# Patient Record
Sex: MALE | Race: BLACK OR AFRICAN AMERICAN | NOT HISPANIC OR LATINO | URBAN - METROPOLITAN AREA
[De-identification: names, ages, dates, MRNs, and addresses within clinical notes are randomized per-mention and may not be internally consistent; named-entity substitution may affect disease eponyms.]

---

## 2017-12-07 ENCOUNTER — INPATIENT (INPATIENT)
Facility: HOSPITAL | Age: 72
LOS: 6 days | Discharge: HOME | End: 2017-12-14
Attending: SURGERY | Admitting: SURGERY
Payer: MEDICARE

## 2017-12-07 VITALS
DIASTOLIC BLOOD PRESSURE: 87 MMHG | SYSTOLIC BLOOD PRESSURE: 126 MMHG | RESPIRATION RATE: 18 BRPM | OXYGEN SATURATION: 100 % | HEART RATE: 55 BPM | TEMPERATURE: 98 F

## 2017-12-07 DIAGNOSIS — I71.4 ABDOMINAL AORTIC ANEURYSM, WITHOUT RUPTURE: ICD-10-CM

## 2017-12-07 LAB
ALBUMIN SERPL ELPH-MCNC: 4.2 G/DL — SIGNIFICANT CHANGE UP (ref 3.3–5)
ALP SERPL-CCNC: 123 U/L — HIGH (ref 40–120)
ALT FLD-CCNC: 17 U/L — SIGNIFICANT CHANGE UP (ref 4–41)
APTT BLD: 32.3 SEC — SIGNIFICANT CHANGE UP (ref 27.5–37.4)
AST SERPL-CCNC: 23 U/L — SIGNIFICANT CHANGE UP (ref 4–40)
BASOPHILS # BLD AUTO: 0.05 K/UL — SIGNIFICANT CHANGE UP (ref 0–0.2)
BASOPHILS NFR BLD AUTO: 0.5 % — SIGNIFICANT CHANGE UP (ref 0–2)
BILIRUB SERPL-MCNC: 0.2 MG/DL — SIGNIFICANT CHANGE UP (ref 0.2–1.2)
BLD GP AB SCN SERPL QL: NEGATIVE — SIGNIFICANT CHANGE UP
BUN SERPL-MCNC: 15 MG/DL — SIGNIFICANT CHANGE UP (ref 7–23)
CALCIUM SERPL-MCNC: 9.4 MG/DL — SIGNIFICANT CHANGE UP (ref 8.4–10.5)
CHLORIDE SERPL-SCNC: 102 MMOL/L — SIGNIFICANT CHANGE UP (ref 98–107)
CO2 SERPL-SCNC: 25 MMOL/L — SIGNIFICANT CHANGE UP (ref 22–31)
CREAT SERPL-MCNC: 0.99 MG/DL — SIGNIFICANT CHANGE UP (ref 0.5–1.3)
EOSINOPHIL # BLD AUTO: 0.28 K/UL — SIGNIFICANT CHANGE UP (ref 0–0.5)
EOSINOPHIL NFR BLD AUTO: 2.8 % — SIGNIFICANT CHANGE UP (ref 0–6)
GLUCOSE SERPL-MCNC: 106 MG/DL — HIGH (ref 70–99)
HCT VFR BLD CALC: 39.5 % — SIGNIFICANT CHANGE UP (ref 39–50)
HGB BLD-MCNC: 12.7 G/DL — LOW (ref 13–17)
IMM GRANULOCYTES # BLD AUTO: 0.02 # — SIGNIFICANT CHANGE UP
IMM GRANULOCYTES NFR BLD AUTO: 0.2 % — SIGNIFICANT CHANGE UP (ref 0–1.5)
INR BLD: 1.14 — SIGNIFICANT CHANGE UP (ref 0.88–1.17)
LYMPHOCYTES # BLD AUTO: 2.68 K/UL — SIGNIFICANT CHANGE UP (ref 1–3.3)
LYMPHOCYTES # BLD AUTO: 26.4 % — SIGNIFICANT CHANGE UP (ref 13–44)
MCHC RBC-ENTMCNC: 28.5 PG — SIGNIFICANT CHANGE UP (ref 27–34)
MCHC RBC-ENTMCNC: 32.2 % — SIGNIFICANT CHANGE UP (ref 32–36)
MCV RBC AUTO: 88.8 FL — SIGNIFICANT CHANGE UP (ref 80–100)
MONOCYTES # BLD AUTO: 1.29 K/UL — HIGH (ref 0–0.9)
MONOCYTES NFR BLD AUTO: 12.7 % — SIGNIFICANT CHANGE UP (ref 2–14)
NEUTROPHILS # BLD AUTO: 5.83 K/UL — SIGNIFICANT CHANGE UP (ref 1.8–7.4)
NEUTROPHILS NFR BLD AUTO: 57.4 % — SIGNIFICANT CHANGE UP (ref 43–77)
NRBC # FLD: 0 — SIGNIFICANT CHANGE UP
PLATELET # BLD AUTO: 206 K/UL — SIGNIFICANT CHANGE UP (ref 150–400)
PMV BLD: 10.4 FL — SIGNIFICANT CHANGE UP (ref 7–13)
POTASSIUM SERPL-MCNC: 4 MMOL/L — SIGNIFICANT CHANGE UP (ref 3.5–5.3)
POTASSIUM SERPL-SCNC: 4 MMOL/L — SIGNIFICANT CHANGE UP (ref 3.5–5.3)
PROT SERPL-MCNC: 7.8 G/DL — SIGNIFICANT CHANGE UP (ref 6–8.3)
PROTHROM AB SERPL-ACNC: 12.7 SEC — SIGNIFICANT CHANGE UP (ref 9.8–13.1)
RBC # BLD: 4.45 M/UL — SIGNIFICANT CHANGE UP (ref 4.2–5.8)
RBC # FLD: 14 % — SIGNIFICANT CHANGE UP (ref 10.3–14.5)
RH IG SCN BLD-IMP: POSITIVE — SIGNIFICANT CHANGE UP
SODIUM SERPL-SCNC: 139 MMOL/L — SIGNIFICANT CHANGE UP (ref 135–145)
WBC # BLD: 10.15 K/UL — SIGNIFICANT CHANGE UP (ref 3.8–10.5)
WBC # FLD AUTO: 10.15 K/UL — SIGNIFICANT CHANGE UP (ref 3.8–10.5)

## 2017-12-07 RX ORDER — SODIUM CHLORIDE 9 MG/ML
1000 INJECTION INTRAMUSCULAR; INTRAVENOUS; SUBCUTANEOUS
Qty: 0 | Refills: 0 | Status: DISCONTINUED | OUTPATIENT
Start: 2017-12-07 | End: 2017-12-08

## 2017-12-07 RX ORDER — SODIUM CHLORIDE 9 MG/ML
1000 INJECTION INTRAMUSCULAR; INTRAVENOUS; SUBCUTANEOUS ONCE
Qty: 0 | Refills: 0 | Status: COMPLETED | OUTPATIENT
Start: 2017-12-07 | End: 2017-12-07

## 2017-12-07 RX ADMIN — SODIUM CHLORIDE 100 MILLILITER(S): 9 INJECTION INTRAMUSCULAR; INTRAVENOUS; SUBCUTANEOUS at 22:45

## 2017-12-07 RX ADMIN — SODIUM CHLORIDE 4000 MILLILITER(S): 9 INJECTION INTRAMUSCULAR; INTRAVENOUS; SUBCUTANEOUS at 19:32

## 2017-12-07 NOTE — ED PROVIDER NOTE - MEDICAL DECISION MAKING DETAILS
72yo male sent in for AAA >5cm with possible rupture, pt presently stable, labs, pre-ps vascular admit 70yo male sent in for AAA >5cm with possible rupture, pt presently stable, labs, pre-ps vascular admit    Cabot: 71M with PMH of HTN who was sent in by his GI MD for a AAA and CI aneurysms with surrounding fat infiltration, with read on CT that "impending rupture cannot be excluded".  The patient reports mild BUQ pain intermittently, no F/C/N/V/D/blood per rectum/urinary sx/numbness or weakness.  On exam, HDS, NAD, lungs CTAB, abd obese but benign, LEs wwp, NTTP, 5/5 strength, SILT.  Will obtain pre-op labs, CT read from outside radiologist, consult vascular, anticipate admission.

## 2017-12-07 NOTE — ED PROVIDER NOTE - OBJECTIVE STATEMENT
72yo male h/o HTN sent in by GI Dr Powell for AAA. Pt had recent colonoscopy for intermittent upper abdominal pain, which they were unable to complete so was sent for CT abd/pelvis. On CT was incidentally found AAA and sent to ED> Denies chest pain, current abdominal pain, SOB, weakness, LE weakness/neurodeficit. + smoker

## 2017-12-07 NOTE — ED ADULT NURSE NOTE - OBJECTIVE STATEMENT
Pt. received to room 4. Pt is a 70 yo male a&ox3 sent to ED by PCP for evaluation of an aneurysm following an abdominal CT he had this afternoon. Pt. had CT for evaluation of constipation he'd been having for a few weeks. Pt. reports intermittent chest pain, but currently denies any chest pain, SOB, dizziness, lightheadedness, n/v, fever/chills. Respirations appear even and unlabored. Abdomen is soft, nontender, nondisteneded. Skin is warm, dry, and intact. Hx of HTN. Pt. currently in US. Will continue to monitor.

## 2017-12-07 NOTE — ED PROVIDER NOTE - ATTENDING CONTRIBUTION TO CARE
I, Jennifer Cabot, MD, have performed a history and physical exam of the patient and discussed their management with the resident. I reviewed the resident's note and agree with the documented findings and plan of care. My medical decision making and observations are found above.    Cabot: 71M with PMH of HTN who was sent in by his GI MD for a AAA and CI aneurysms with surrounding fat infiltration, with read on CT that "impending rupture cannot be excluded".  The patient reports mild BUQ pain intermittently, no F/C/N/V/D/blood per rectum/urinary sx/numbness or weakness.  On exam, HDS, NAD, lungs CTAB, abd obese but benign, LEs wwp, NTTP, 5/5 strength, SILT.  Will obtain pre-op labs, CT read from outside radiologist, consult vascular, anticipate admission.

## 2017-12-07 NOTE — ED ADULT TRIAGE NOTE - CHIEF COMPLAINT QUOTE
Pt states he went for a CT scan done for constipation and rectal pain. After test, doctor called patient at home and told him to go to ED to ro aneurysm. Co intermittent pain across upper abdomen.

## 2017-12-08 DIAGNOSIS — I10 ESSENTIAL (PRIMARY) HYPERTENSION: ICD-10-CM

## 2017-12-08 DIAGNOSIS — I71.4 ABDOMINAL AORTIC ANEURYSM, WITHOUT RUPTURE: ICD-10-CM

## 2017-12-08 LAB
BLD GP AB SCN SERPL QL: NEGATIVE — SIGNIFICANT CHANGE UP
BUN SERPL-MCNC: 8 MG/DL — SIGNIFICANT CHANGE UP (ref 7–23)
CALCIUM SERPL-MCNC: 9.3 MG/DL — SIGNIFICANT CHANGE UP (ref 8.4–10.5)
CHLORIDE SERPL-SCNC: 103 MMOL/L — SIGNIFICANT CHANGE UP (ref 98–107)
CO2 SERPL-SCNC: 26 MMOL/L — SIGNIFICANT CHANGE UP (ref 22–31)
CREAT SERPL-MCNC: 0.71 MG/DL — SIGNIFICANT CHANGE UP (ref 0.5–1.3)
GLUCOSE SERPL-MCNC: 99 MG/DL — SIGNIFICANT CHANGE UP (ref 70–99)
HCT VFR BLD CALC: 37.8 % — LOW (ref 39–50)
HGB BLD-MCNC: 12.6 G/DL — LOW (ref 13–17)
INR BLD: 1.09 — SIGNIFICANT CHANGE UP (ref 0.88–1.17)
MAGNESIUM SERPL-MCNC: 2 MG/DL — SIGNIFICANT CHANGE UP (ref 1.6–2.6)
MCHC RBC-ENTMCNC: 29.9 PG — SIGNIFICANT CHANGE UP (ref 27–34)
MCHC RBC-ENTMCNC: 33.3 % — SIGNIFICANT CHANGE UP (ref 32–36)
MCV RBC AUTO: 89.8 FL — SIGNIFICANT CHANGE UP (ref 80–100)
NRBC # FLD: 0 — SIGNIFICANT CHANGE UP
PHOSPHATE SERPL-MCNC: 3.6 MG/DL — SIGNIFICANT CHANGE UP (ref 2.5–4.5)
PLATELET # BLD AUTO: 195 K/UL — SIGNIFICANT CHANGE UP (ref 150–400)
PMV BLD: 10 FL — SIGNIFICANT CHANGE UP (ref 7–13)
POTASSIUM SERPL-MCNC: 3.9 MMOL/L — SIGNIFICANT CHANGE UP (ref 3.5–5.3)
POTASSIUM SERPL-SCNC: 3.9 MMOL/L — SIGNIFICANT CHANGE UP (ref 3.5–5.3)
PROTHROM AB SERPL-ACNC: 12.5 SEC — SIGNIFICANT CHANGE UP (ref 9.8–13.1)
RBC # BLD: 4.21 M/UL — SIGNIFICANT CHANGE UP (ref 4.2–5.8)
RBC # FLD: 14 % — SIGNIFICANT CHANGE UP (ref 10.3–14.5)
RH IG SCN BLD-IMP: POSITIVE — SIGNIFICANT CHANGE UP
SODIUM SERPL-SCNC: 142 MMOL/L — SIGNIFICANT CHANGE UP (ref 135–145)
WBC # BLD: 8.32 K/UL — SIGNIFICANT CHANGE UP (ref 3.8–10.5)
WBC # FLD AUTO: 8.32 K/UL — SIGNIFICANT CHANGE UP (ref 3.8–10.5)

## 2017-12-08 PROCEDURE — 71275 CT ANGIOGRAPHY CHEST: CPT | Mod: 26

## 2017-12-08 PROCEDURE — 99232 SBSQ HOSP IP/OBS MODERATE 35: CPT

## 2017-12-08 PROCEDURE — 93925 LOWER EXTREMITY STUDY: CPT | Mod: 26

## 2017-12-08 PROCEDURE — 99222 1ST HOSP IP/OBS MODERATE 55: CPT

## 2017-12-08 PROCEDURE — 74174 CTA ABD&PLVS W/CONTRAST: CPT | Mod: 26

## 2017-12-08 RX ORDER — HYDROCORTISONE 20 MG
20 TABLET ORAL DAILY
Qty: 0 | Refills: 0 | Status: DISCONTINUED | OUTPATIENT
Start: 2017-12-08 | End: 2017-12-08

## 2017-12-08 RX ORDER — ENOXAPARIN SODIUM 100 MG/ML
40 INJECTION SUBCUTANEOUS DAILY
Qty: 0 | Refills: 0 | Status: DISCONTINUED | OUTPATIENT
Start: 2017-12-08 | End: 2017-12-12

## 2017-12-08 RX ORDER — FAMOTIDINE 10 MG/ML
40 INJECTION INTRAVENOUS
Qty: 0 | Refills: 0 | Status: DISCONTINUED | OUTPATIENT
Start: 2017-12-08 | End: 2017-12-14

## 2017-12-08 RX ORDER — PANTOPRAZOLE SODIUM 20 MG/1
40 TABLET, DELAYED RELEASE ORAL
Qty: 0 | Refills: 0 | Status: DISCONTINUED | OUTPATIENT
Start: 2017-12-08 | End: 2017-12-14

## 2017-12-08 RX ORDER — NIFEDIPINE 30 MG
90 TABLET, EXTENDED RELEASE 24 HR ORAL DAILY
Qty: 0 | Refills: 0 | Status: DISCONTINUED | OUTPATIENT
Start: 2017-12-08 | End: 2017-12-14

## 2017-12-08 RX ADMIN — ENOXAPARIN SODIUM 40 MILLIGRAM(S): 100 INJECTION SUBCUTANEOUS at 11:22

## 2017-12-08 RX ADMIN — PANTOPRAZOLE SODIUM 40 MILLIGRAM(S): 20 TABLET, DELAYED RELEASE ORAL at 05:45

## 2017-12-08 RX ADMIN — FAMOTIDINE 40 MILLIGRAM(S): 10 INJECTION INTRAVENOUS at 17:46

## 2017-12-08 RX ADMIN — FAMOTIDINE 40 MILLIGRAM(S): 10 INJECTION INTRAVENOUS at 05:39

## 2017-12-08 RX ADMIN — Medication 90 MILLIGRAM(S): at 05:39

## 2017-12-08 NOTE — H&P ADULT - NSHPPHYSICALEXAM_GEN_ALL_CORE
Gen: Pleasant man in NAD, AOx3  Pulm: Normal respiratory effort  CV: S1/S2  Abd: Obese, soft, NT, ND  Ext: Bilateral femoral pulses, bilateral PTs. Nonpalpable DP.   Smooth LE with minimal hair, scaling skin. Toenails thickened.

## 2017-12-08 NOTE — H&P ADULT - NSHPLABSRESULTS_GEN_ALL_CORE
12.7   10.15 )-----------( 206      ( 07 Dec 2017 18:40 )             39.5       12-07    139  |  102  |  15  ----------------------------<  106<H>  4.0   |  25  |  0.99    Ca    9.4      07 Dec 2017 18:40    TPro  7.8  /  Alb  4.2  /  TBili  0.2  /  DBili  x   /  AST  23  /  ALT  17  /  AlkPhos  123<H>  12-07      PT/INR - ( 07 Dec 2017 18:40 )   PT: 12.7 SEC;   INR: 1.14          PTT - ( 07 Dec 2017 18:40 )  PTT:32.3 SEC

## 2017-12-08 NOTE — CONSULT NOTE ADULT - ASSESSMENT
70 y/o M with a PMH significant for HTN and current everyday smoker who was sent into the ED by his GI doctor due to an incidental finding of AAA on CT abdomen. Vascular Surgery planning for EVAR, Cardiology consulted to optimize for procedure.

## 2017-12-08 NOTE — CONSULT NOTE ADULT - SUBJECTIVE AND OBJECTIVE BOX
HPI: This is a 70 y/o M with a PMH significant for HTN and current everyday smoker who was sent into the ED by his GI doctor due to an incidental finding of AAA on CT abdomen. He was undergoing colonoscopy, however, scope could not be passed, so he underwent CT abdomen, which revealed     PAST MEDICAL & SURGICAL HISTORY:  HTN (hypertension)  No significant past surgical history      FAMILY HISTORY:      SOCIAL HISTORY:  Smoking: [ ] Never Smoked [ ] Former Smoker (__ packs x ___ years) [ ] Current Smoker  (__ packs x ___ years)  Substance Use: [ ] Never Used [ ] Used ____  EtOH Use:  Marital Status: [ ] Single [ ]  [ ]  [ ]   Sexual History:   Occupation:  Recent Travel:  Country of Birth:  Advance Directives:    Allergies    No Known Allergies    Intolerances        HOME MEDICATIONS:  Home Medications:  famotidine 40 mg oral tablet: 1 tab(s) orally 2 times a day (08 Dec 2017 01:04)  NIFEdipine 90 mg oral tablet, extended release: 1 tab(s) orally once a day (08 Dec 2017 01:03)  Protonix 40 mg oral delayed release tablet: 1 tab(s) orally once a day (08 Dec 2017 01:04)      REVIEW OF SYSTEMS:  Constitutional: [ ] negative [ ] fevers [ ] chills [ ] weight loss [ ] weight gain  HEENT: [ ] negative [ ] dry eyes [ ] eye irritation [ ] postnasal drip [ ] nasal congestion  CV: [ ] negative  [ ] chest pain [ ] orthopnea [ ] palpitations [ ] murmur  Resp: [ ] negative [ ] cough [ ] shortness of breath [ ] dyspnea [ ] wheezing [ ] sputum [ ] hemoptysis  GI: [ ] negative [ ] nausea [ ] vomiting [ ] diarrhea [ ] constipation [ ] abd pain [ ] dysphagia   : [ ] negative [ ] dysuria [ ] nocturia [ ] hematuria [ ] increased urinary frequency  Musculoskeletal: [ ] negative [ ] back pain [ ] myalgias [ ] arthralgias [ ] fracture  Skin: [ ] negative [ ] rash [ ] itch  Neurological: [ ] negative [ ] headache [ ] dizziness [ ] syncope [ ] weakness [ ] numbness  Psychiatric: [ ] negative [ ] anxiety [ ] depression  Endocrine: [ ] negative [ ] diabetes [ ] thyroid problem  Hematologic/Lymphatic: [ ] negative [ ] anemia [ ] bleeding problem  Allergic/Immunologic: [ ] negative [ ] itchy eyes [ ] nasal discharge [ ] hives [ ] angioedema  [ ] All other systems negative  [ ] Unable to assess ROS because ________    OBJECTIVE:  ICU Vital Signs Last 24 Hrs  T(C): 36.8 (08 Dec 2017 11:17), Max: 36.8 (07 Dec 2017 22:45)  T(F): 98.2 (08 Dec 2017 11:17), Max: 98.2 (07 Dec 2017 22:45)  HR: 61 (08 Dec 2017 11:17) (55 - 67)  BP: 120/67 (08 Dec 2017 11:17) (118/72 - 131/83)  BP(mean): --  ABP: --  ABP(mean): --  RR: 18 (08 Dec 2017 11:17) (15 - 18)  SpO2: 98% (08 Dec 2017 11:17) (96% - 100%)        12-07 @ 07:01  -  12-08 @ 07:00  --------------------------------------------------------  IN: 0 mL / OUT: 1400 mL / NET: -1400 mL    12-08 @ 07:01  -  12-08 @ 13:15  --------------------------------------------------------  IN: 0 mL / OUT: 600 mL / NET: -600 mL      CAPILLARY BLOOD GLUCOSE          PHYSICAL EXAM:  General:   HEENT:   Lymph Nodes:  Neck:   Respiratory:   Cardiovascular:   Abdomen:   Extremities:   Skin:   Neurological:  Psychiatry:    LINES:     HOSPITAL MEDICATIONS:  Standing Meds:  enoxaparin Injectable 40 milliGRAM(s) SubCutaneous daily  famotidine    Tablet 40 milliGRAM(s) Oral two times a day  NIFEdipine XL 90 milliGRAM(s) Oral daily  pantoprazole    Tablet 40 milliGRAM(s) Oral before breakfast      PRN Meds:      LABS:                        12.6   8.32  )-----------( 195      ( 08 Dec 2017 05:30 )             37.8     Hgb Trend: 12.6<--, 12.7<--  12-08    142  |  103  |  8   ----------------------------<  99  3.9   |  26  |  0.71    Ca    9.3      08 Dec 2017 05:30  Phos  3.6     12-08  Mg     2.0     12-08    TPro  7.8  /  Alb  4.2  /  TBili  0.2  /  DBili  x   /  AST  23  /  ALT  17  /  AlkPhos  123<H>  12-07    Creatinine Trend: 0.71<--, 0.99<--  PT/INR - ( 08 Dec 2017 05:30 )   PT: 12.5 SEC;   INR: 1.09          PTT - ( 07 Dec 2017 18:40 )  PTT:32.3 SEC          MICROBIOLOGY:       RADIOLOGY:  [ ] Reviewed and interpreted by me    EKG: HPI: This is a 72 y/o M with a PMH significant for HTN and current everyday smoker who was sent into the ED by his GI doctor due to an incidental finding of AAA on CT abdomen. He was undergoing colonoscopy, however, scope could not be passed, so he underwent CT abdomen, which revealed a  5.3 cm infrarenal AAA, a 3.9 cm R common iliac artery aneurysm, and 2.2 cm left common iliac artery aneurysm. Vascular Surgery planning for EVAR, Cardiology consulted to optimize for procedure. He currently has no complaints; denies abdominal or flank pain, no chest pain, SOB, palpitations. He states that he sleeps with 2-3 pillows at night, but can sleep flat without SOB. He states he uses the extra pillows for his neck. He also states he can walk multiple blocks without SOB; he emphatically states that he never gets SOB. He is a current everyday smoker, 3-4 cigarettes/day. Has smoked since age 12 and smoked 1 ppd for many of those years. He does not use stairs because of RLE pain/swelling that develops when he uses stairs. The pain/swelling is 2/2 intraoperative rods/screws that were placed in his legs 30 years ago for an injury.     PAST MEDICAL & SURGICAL HISTORY:  HTN (hypertension)  No significant past surgical history      FAMILY HISTORY:      SOCIAL HISTORY:  Smoking: [ ] Never Smoked [ ] Former Smoker (__ packs x ___ years) [ ] Current Smoker  (__ packs x ___ years)  Substance Use: [ ] Never Used [ ] Used ____  EtOH Use:  Marital Status: [ ] Single [ ]  [ ]  [ ]   Sexual History:   Occupation:  Recent Travel:  Country of Birth:  Advance Directives:    Allergies    No Known Allergies    Intolerances        HOME MEDICATIONS:  Home Medications:  famotidine 40 mg oral tablet: 1 tab(s) orally 2 times a day (08 Dec 2017 01:04)  NIFEdipine 90 mg oral tablet, extended release: 1 tab(s) orally once a day (08 Dec 2017 01:03)  Protonix 40 mg oral delayed release tablet: 1 tab(s) orally once a day (08 Dec 2017 01:04)      REVIEW OF SYSTEMS:  Constitutional: [x] negative [ ] fevers [ ] chills [ ] weight loss [ ] weight gain  HEENT: [ ] negative [ ] dry eyes [ ] eye irritation [ ] postnasal drip [ ] nasal congestion  CV: [x] negative  [ ] chest pain [ ] orthopnea [ ] palpitations [ ] murmur  Resp: [x] negative [ ] cough [ ] shortness of breath [ ] dyspnea [ ] wheezing [ ] sputum [ ] hemoptysis  GI: [ ] negative [ ] nausea [ ] vomiting [ ] diarrhea [x] constipation [ ] abd pain [ ] dysphagia   : [x] negative [ ] dysuria [ ] nocturia [ ] hematuria [ ] increased urinary frequency  Musculoskeletal: [ ] negative [ ] back pain [ ] myalgias [ ] arthralgias [ ] fracture  Skin: [ ] negative [ ] rash [ ] itch  Neurological: [ ] negative [ ] headache [ ] dizziness [ ] syncope [ ] weakness [ ] numbness  Psychiatric: [ ] negative [ ] anxiety [ ] depression  Endocrine: [ ] negative [ ] diabetes [ ] thyroid problem  Hematologic/Lymphatic: [ ] negative [ ] anemia [ ] bleeding problem  Allergic/Immunologic: [ ] negative [ ] itchy eyes [ ] nasal discharge [ ] hives [ ] angioedema  [ ] All other systems negative  [ ] Unable to assess ROS because ________    OBJECTIVE:  ICU Vital Signs Last 24 Hrs  T(C): 36.8 (08 Dec 2017 11:17), Max: 36.8 (07 Dec 2017 22:45)  T(F): 98.2 (08 Dec 2017 11:17), Max: 98.2 (07 Dec 2017 22:45)  HR: 61 (08 Dec 2017 11:17) (55 - 67)  BP: 120/67 (08 Dec 2017 11:17) (118/72 - 131/83)  BP(mean): --  ABP: --  ABP(mean): --  RR: 18 (08 Dec 2017 11:17) (15 - 18)  SpO2: 98% (08 Dec 2017 11:17) (96% - 100%)        12-07 @ 07:01  -  12-08 @ 07:00  --------------------------------------------------------  IN: 0 mL / OUT: 1400 mL / NET: -1400 mL    12-08 @ 07:01  -  12-08 @ 13:15  --------------------------------------------------------  IN: 0 mL / OUT: 600 mL / NET: -600 mL      CAPILLARY BLOOD GLUCOSE          PHYSICAL EXAM:  General: lying comfortably in bed, NAD  HEENT: NC/AT, PERRLA, EOMI  Neck: no JVD, supple  Respiratory: CTAB  Cardiovascular: RRR, +S1, S2, no m/r/g  Abdomen: soft, obese, nontender, +BS  Extremities: 2+ peripheral pulses, no edema  Skin: no rashes, lesions  Neurological: nonfocal, AOx3    HOSPITAL MEDICATIONS:  Standing Meds:  enoxaparin Injectable 40 milliGRAM(s) SubCutaneous daily  famotidine    Tablet 40 milliGRAM(s) Oral two times a day  NIFEdipine XL 90 milliGRAM(s) Oral daily  pantoprazole    Tablet 40 milliGRAM(s) Oral before breakfast      PRN Meds:      LABS:                        12.6   8.32  )-----------( 195      ( 08 Dec 2017 05:30 )             37.8     Hgb Trend: 12.6<--, 12.7<--  12-08    142  |  103  |  8   ----------------------------<  99  3.9   |  26  |  0.71    Ca    9.3      08 Dec 2017 05:30  Phos  3.6     12-08  Mg     2.0     12-08    TPro  7.8  /  Alb  4.2  /  TBili  0.2  /  DBili  x   /  AST  23  /  ALT  17  /  AlkPhos  123<H>  12-07    Creatinine Trend: 0.71<--, 0.99<--  PT/INR - ( 08 Dec 2017 05:30 )   PT: 12.5 SEC;   INR: 1.09          PTT - ( 07 Dec 2017 18:40 )  PTT:32.3 SEC          MICROBIOLOGY:       RADIOLOGY:  [ ] Reviewed and interpreted by me    EKG:

## 2017-12-08 NOTE — PROGRESS NOTE ADULT - SUBJECTIVE AND OBJECTIVE BOX
VASCULAR SURGERY PROGRESS NOTE    POST OPERATIVE DAY #:       SUBJECTIVE:  Patient seen and examined at bedside. AVSS. No acute overnight events. Denies fever, CP, and SOB      Vital Signs Last 24 Hrs  T(C): 36.7 (08 Dec 2017 05:36), Max: 36.8 (07 Dec 2017 22:45)  T(F): 98.1 (08 Dec 2017 05:36), Max: 98.2 (07 Dec 2017 22:45)  HR: 63 (08 Dec 2017 05:36) (55 - 67)  BP: 118/72 (08 Dec 2017 05:36) (118/72 - 131/83)  BP(mean): --  RR: 16 (08 Dec 2017 05:36) (15 - 18)  SpO2: 96% (08 Dec 2017 05:36) (96% - 100%)    Physical Exam  General: awake, alert, NAD   Pulm: respirations unlabored, no increased WOB  Extremities: b/l dopplerable DP/PT, WWP    I&O's Summary    07 Dec 2017 07:01  -  08 Dec 2017 07:00  --------------------------------------------------------  IN: 0 mL / OUT: 1400 mL / NET: -1400 mL      I&O's Detail    07 Dec 2017 07:01  -  08 Dec 2017 07:00  --------------------------------------------------------  IN:  Total IN: 0 mL    OUT:    Voided: 1400 mL  Total OUT: 1400 mL    Total NET: -1400 mL          MEDICATIONS  (STANDING):  enoxaparin Injectable 40 milliGRAM(s) SubCutaneous daily  famotidine    Tablet 40 milliGRAM(s) Oral two times a day  NIFEdipine XL 90 milliGRAM(s) Oral daily  pantoprazole    Tablet 40 milliGRAM(s) Oral before breakfast    MEDICATIONS  (PRN):      LABS:                        12.6   8.32  )-----------( 195      ( 08 Dec 2017 05:30 )             37.8     12-08    142  |  103  |  8   ----------------------------<  99  3.9   |  26  |  0.71    Ca    9.3      08 Dec 2017 05:30  Phos  3.6     12-08  Mg     2.0     12-08    TPro  7.8  /  Alb  4.2  /  TBili  0.2  /  DBili  x   /  AST  23  /  ALT  17  /  AlkPhos  123<H>  12-07    PT/INR - ( 08 Dec 2017 05:30 )   PT: 12.5 SEC;   INR: 1.09          PTT - ( 07 Dec 2017 18:40 )  PTT:32.3 SEC      RADIOLOGY & ADDITIONAL STUDIES:  No new studies

## 2017-12-08 NOTE — H&P ADULT - ASSESSMENT
71 year old man with HTN, active smoker presenting with an incidentally found 5.3 cm infrarenal AAA and a 3.9 cm R common iliac artery aneurysm and 2.2 cm left common iliac artery aneurysm. Imaging from [facility] reviewed with vascular fellow Dr. Mtz; patient asymptomatic with no evidence of impending rupture.     - CT Angio chest / abdomen / pelvis   - VA duplex of bilateral LE to evaluate for popliteal aneurysm  - Possible EVAAR   - Maintain BP < 150 / 90  - VTE ppx  - Reg diet  - Patient seen and discussed with Vascular fellow, Dr. Bibi Ramos MD PGY2  C-team surgery: [id number]

## 2017-12-08 NOTE — CONSULT NOTE ADULT - PROBLEM SELECTOR RECOMMENDATION 9
On VA Duplex: Juxtarenal and infrarenal abdominal aortic aneurysms noted, measuring 5.9 cm and 5.4 cm, respectively.  The aneurysmal sac appears partially thrombosed. Right common iliac artery aneurysm noted measuring 3.7 cm at its maximal diameter. Left common iliac artery appears aneurysmal measuring 2.8 cm in transverse. Of note, on CTA of chest, patient found to have calcifications in coronary arteries. Furthermore, patient does not clinically appear to have good exercise tolerance, and in light of his significant obesity, current everyday smoking, and the potential for this intermediate risk procedure to be converted into a high risk surgery, it would be most prudent to obtain further cardiac w/u.   - recommend pharmacologic stress test to assess cardiac fxn On VA Duplex: Juxtarenal and infrarenal abdominal aortic aneurysms noted, measuring 5.9 cm and 5.4 cm, respectively.  The aneurysmal sac appears partially thrombosed. Right common iliac artery aneurysm noted measuring 3.7 cm at its maximal diameter. Left common iliac artery appears aneurysmal measuring 2.8 cm in transverse. Of note, on CTA of chest, patient found to have calcifications in coronary arteries. Furthermore, patient does not clinically appear to have good exercise tolerance, and in light of his significant obesity, current everyday smoking, and the potential for this intermediate risk procedure to be converted into a high risk surgery, it would be most prudent to obtain further cardiac w/u.   - recommend pharmacologic stress test to assess cardiac fxn prior to EVAR

## 2017-12-08 NOTE — H&P ADULT - HISTORY OF PRESENT ILLNESS
70 yo man with HTN and actively smoking who was referred to the ED by his gastroenterologist for an incidentally found AAA on CT abdomen. The patient was undergoing a colonoscopy but the scope was unable to be passed so the patient underwent a CT abdomen. The CT indicates a 5.3 cm infrarenal AAA, 3.9 cm R common iliac aneurysm and a 2.2 cm left common iliac artery aneurysm. He denies abdominal pain. He reports some pain in his RLE when he walks but expresses that this pain is where he had a prior orthopedic repair.     PMH: HTN, possible gastric ulcer history. Of note, patient does not remember the name of his GI physician.   PSH: RLE fracture 2/2 trauma s/p unknown repair. No other operations.     Medications reviewed with [facility] pharmacy.

## 2017-12-08 NOTE — H&P ADULT - ATTENDING COMMENTS
Patient seen and evaluated in ED. 71 M p/w incidentally found infrarenal AAA and bilateral SEE aneurysms (R>L). Some stranding noted around aortic bifurcation. Patient asymptomatic with benign abdominal exam. Admitted for endovascular repair of aneurysms.

## 2017-12-08 NOTE — H&P ADULT - NSHPREVIEWOFSYSTEMS_GEN_ALL_CORE
REVIEW OF SYSTEMS:    CONSTITUTIONAL: No weakness, fevers or chills  EYES/ENT: No visual changes;  No vertigo or throat pain   NECK: No pain or stiffness  RESPIRATORY: No cough, wheezing, hemoptysis; No shortness of breath  CARDIOVASCULAR: No chest pain or palpitations  GASTROINTESTINAL: See HPI  GENITOURINARY: No dysuria, frequency or hematuria  NEUROLOGICAL: No numbness or weakness  SKIN: No itching, rashes

## 2017-12-09 LAB
BUN SERPL-MCNC: 10 MG/DL — SIGNIFICANT CHANGE UP (ref 7–23)
CALCIUM SERPL-MCNC: 9.6 MG/DL — SIGNIFICANT CHANGE UP (ref 8.4–10.5)
CHLORIDE SERPL-SCNC: 100 MMOL/L — SIGNIFICANT CHANGE UP (ref 98–107)
CO2 SERPL-SCNC: 27 MMOL/L — SIGNIFICANT CHANGE UP (ref 22–31)
CREAT SERPL-MCNC: 0.81 MG/DL — SIGNIFICANT CHANGE UP (ref 0.5–1.3)
GLUCOSE SERPL-MCNC: 98 MG/DL — SIGNIFICANT CHANGE UP (ref 70–99)
HCT VFR BLD CALC: 39.5 % — SIGNIFICANT CHANGE UP (ref 39–50)
HGB BLD-MCNC: 13.1 G/DL — SIGNIFICANT CHANGE UP (ref 13–17)
MAGNESIUM SERPL-MCNC: 2.1 MG/DL — SIGNIFICANT CHANGE UP (ref 1.6–2.6)
MCHC RBC-ENTMCNC: 29.5 PG — SIGNIFICANT CHANGE UP (ref 27–34)
MCHC RBC-ENTMCNC: 33.2 % — SIGNIFICANT CHANGE UP (ref 32–36)
MCV RBC AUTO: 89 FL — SIGNIFICANT CHANGE UP (ref 80–100)
NRBC # FLD: 0 — SIGNIFICANT CHANGE UP
PHOSPHATE SERPL-MCNC: 4.1 MG/DL — SIGNIFICANT CHANGE UP (ref 2.5–4.5)
PLATELET # BLD AUTO: 213 K/UL — SIGNIFICANT CHANGE UP (ref 150–400)
PMV BLD: 10.5 FL — SIGNIFICANT CHANGE UP (ref 7–13)
POTASSIUM SERPL-MCNC: 4.1 MMOL/L — SIGNIFICANT CHANGE UP (ref 3.5–5.3)
POTASSIUM SERPL-SCNC: 4.1 MMOL/L — SIGNIFICANT CHANGE UP (ref 3.5–5.3)
RBC # BLD: 4.44 M/UL — SIGNIFICANT CHANGE UP (ref 4.2–5.8)
RBC # FLD: 13.9 % — SIGNIFICANT CHANGE UP (ref 10.3–14.5)
SODIUM SERPL-SCNC: 139 MMOL/L — SIGNIFICANT CHANGE UP (ref 135–145)
WBC # BLD: 8.24 K/UL — SIGNIFICANT CHANGE UP (ref 3.8–10.5)
WBC # FLD AUTO: 8.24 K/UL — SIGNIFICANT CHANGE UP (ref 3.8–10.5)

## 2017-12-09 RX ORDER — MAGNESIUM HYDROXIDE 400 MG/1
30 TABLET, CHEWABLE ORAL DAILY
Qty: 0 | Refills: 0 | Status: DISCONTINUED | OUTPATIENT
Start: 2017-12-09 | End: 2017-12-14

## 2017-12-09 RX ORDER — DOCUSATE SODIUM 100 MG
100 CAPSULE ORAL THREE TIMES A DAY
Qty: 0 | Refills: 0 | Status: DISCONTINUED | OUTPATIENT
Start: 2017-12-09 | End: 2017-12-14

## 2017-12-09 RX ADMIN — Medication 100 MILLIGRAM(S): at 05:40

## 2017-12-09 RX ADMIN — Medication 90 MILLIGRAM(S): at 05:25

## 2017-12-09 RX ADMIN — MAGNESIUM HYDROXIDE 30 MILLILITER(S): 400 TABLET, CHEWABLE ORAL at 15:03

## 2017-12-09 RX ADMIN — ENOXAPARIN SODIUM 40 MILLIGRAM(S): 100 INJECTION SUBCUTANEOUS at 13:45

## 2017-12-09 RX ADMIN — Medication 100 MILLIGRAM(S): at 13:45

## 2017-12-09 RX ADMIN — PANTOPRAZOLE SODIUM 40 MILLIGRAM(S): 20 TABLET, DELAYED RELEASE ORAL at 06:46

## 2017-12-09 RX ADMIN — FAMOTIDINE 40 MILLIGRAM(S): 10 INJECTION INTRAVENOUS at 05:25

## 2017-12-09 RX ADMIN — FAMOTIDINE 40 MILLIGRAM(S): 10 INJECTION INTRAVENOUS at 18:51

## 2017-12-09 NOTE — PROGRESS NOTE ADULT - SUBJECTIVE AND OBJECTIVE BOX
VASCULAR SURGERY PROGRESS NOTE      SUBJECTIVE:  Patient seen and examined at bedside. AVSS. No acute overnight events. Denies fever, CP, abdominal and back pain, and SOB      Vital Signs Last 24 Hrs  T(C): 36.7 (09 Dec 2017 01:34), Max: 36.9 (08 Dec 2017 21:32)  T(F): 98.1 (09 Dec 2017 01:34), Max: 98.5 (08 Dec 2017 21:32)  HR: 72 (09 Dec 2017 01:34) (61 - 74)  BP: 133/78 (09 Dec 2017 01:34) (115/71 - 133/78)  BP(mean): --  RR: 17 (09 Dec 2017 01:34) (16 - 18)  SpO2: 96% (09 Dec 2017 01:34) (96% - 98%)    Physical Exam  General: awake, alert, NAD   Pulm: respirations unlabored, no increased WOB  Extremities: b/l dopplerable DP/PT, WWP    I&O's Summary    07 Dec 2017 07:01  -  08 Dec 2017 07:00  --------------------------------------------------------  IN: 0 mL / OUT: 1400 mL / NET: -1400 mL    08 Dec 2017 07:01  -  09 Dec 2017 05:22  --------------------------------------------------------  IN: 0 mL / OUT: 1875 mL / NET: -1875 mL      I&O's Detail    07 Dec 2017 07:01  -  08 Dec 2017 07:00  --------------------------------------------------------  IN:  Total IN: 0 mL    OUT:    Voided: 1400 mL  Total OUT: 1400 mL    Total NET: -1400 mL      08 Dec 2017 07:01  -  09 Dec 2017 05:22  --------------------------------------------------------  IN:  Total IN: 0 mL    OUT:    Voided: 1875 mL  Total OUT: 1875 mL    Total NET: -1875 mL          MEDICATIONS  (STANDING):  enoxaparin Injectable 40 milliGRAM(s) SubCutaneous daily  famotidine    Tablet 40 milliGRAM(s) Oral two times a day  NIFEdipine XL 90 milliGRAM(s) Oral daily  pantoprazole    Tablet 40 milliGRAM(s) Oral before breakfast    MEDICATIONS  (PRN):      LABS:                        12.6   8.32  )-----------( 195      ( 08 Dec 2017 05:30 )             37.8     12-08    142  |  103  |  8   ----------------------------<  99  3.9   |  26  |  0.71    Ca    9.3      08 Dec 2017 05:30  Phos  3.6     12-08  Mg     2.0     12-08    TPro  7.8  /  Alb  4.2  /  TBili  0.2  /  DBili  x   /  AST  23  /  ALT  17  /  AlkPhos  123<H>  12-07    PT/INR - ( 08 Dec 2017 05:30 )   PT: 12.5 SEC;   INR: 1.09          PTT - ( 07 Dec 2017 18:40 )  PTT:32.3 SEC      RADIOLOGY & ADDITIONAL STUDIES:  12/8 CTA C/A/P    Distal abdominal aortic aneurysm extending to the right common iliac and   possibly the very proximal left common iliac artery with adjacent fat   stranding suspicious for impending rupture.    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        12/8 VA Duplex   Summary/Impressions:  1.  The right popliteal artery appears aneurysmal,  measuring 2.4 cm. The aneurysmal sac appears moderately  thrombosed, but the vessel remains patent.  Decreased  velocities with monophasic spectral Doppler waveforms were  noted within the vessel.  There is one vessel runoff noted  to the distal right ankle via the right posterior tibial  artery.  2.  No evidence of left popliteal artery aneurysm.

## 2017-12-10 LAB
BUN SERPL-MCNC: 12 MG/DL — SIGNIFICANT CHANGE UP (ref 7–23)
CALCIUM SERPL-MCNC: 9.2 MG/DL — SIGNIFICANT CHANGE UP (ref 8.4–10.5)
CHLORIDE SERPL-SCNC: 103 MMOL/L — SIGNIFICANT CHANGE UP (ref 98–107)
CO2 SERPL-SCNC: 26 MMOL/L — SIGNIFICANT CHANGE UP (ref 22–31)
CREAT SERPL-MCNC: 0.83 MG/DL — SIGNIFICANT CHANGE UP (ref 0.5–1.3)
GLUCOSE SERPL-MCNC: 103 MG/DL — HIGH (ref 70–99)
HCT VFR BLD CALC: 39.1 % — SIGNIFICANT CHANGE UP (ref 39–50)
HGB BLD-MCNC: 12.6 G/DL — LOW (ref 13–17)
MAGNESIUM SERPL-MCNC: 2.3 MG/DL — SIGNIFICANT CHANGE UP (ref 1.6–2.6)
MCHC RBC-ENTMCNC: 28.6 PG — SIGNIFICANT CHANGE UP (ref 27–34)
MCHC RBC-ENTMCNC: 32.2 % — SIGNIFICANT CHANGE UP (ref 32–36)
MCV RBC AUTO: 88.9 FL — SIGNIFICANT CHANGE UP (ref 80–100)
NRBC # FLD: 0 — SIGNIFICANT CHANGE UP
PHOSPHATE SERPL-MCNC: 4.1 MG/DL — SIGNIFICANT CHANGE UP (ref 2.5–4.5)
PLATELET # BLD AUTO: 201 K/UL — SIGNIFICANT CHANGE UP (ref 150–400)
PMV BLD: 10.3 FL — SIGNIFICANT CHANGE UP (ref 7–13)
POTASSIUM SERPL-MCNC: 4.1 MMOL/L — SIGNIFICANT CHANGE UP (ref 3.5–5.3)
POTASSIUM SERPL-SCNC: 4.1 MMOL/L — SIGNIFICANT CHANGE UP (ref 3.5–5.3)
RBC # BLD: 4.4 M/UL — SIGNIFICANT CHANGE UP (ref 4.2–5.8)
RBC # FLD: 13.8 % — SIGNIFICANT CHANGE UP (ref 10.3–14.5)
SODIUM SERPL-SCNC: 141 MMOL/L — SIGNIFICANT CHANGE UP (ref 135–145)
WBC # BLD: 7.66 K/UL — SIGNIFICANT CHANGE UP (ref 3.8–10.5)
WBC # FLD AUTO: 7.66 K/UL — SIGNIFICANT CHANGE UP (ref 3.8–10.5)

## 2017-12-10 RX ADMIN — Medication 100 MILLIGRAM(S): at 13:13

## 2017-12-10 RX ADMIN — Medication 100 MILLIGRAM(S): at 05:50

## 2017-12-10 RX ADMIN — MAGNESIUM HYDROXIDE 30 MILLILITER(S): 400 TABLET, CHEWABLE ORAL at 05:56

## 2017-12-10 RX ADMIN — ENOXAPARIN SODIUM 40 MILLIGRAM(S): 100 INJECTION SUBCUTANEOUS at 11:20

## 2017-12-10 RX ADMIN — PANTOPRAZOLE SODIUM 40 MILLIGRAM(S): 20 TABLET, DELAYED RELEASE ORAL at 06:48

## 2017-12-10 RX ADMIN — FAMOTIDINE 40 MILLIGRAM(S): 10 INJECTION INTRAVENOUS at 05:50

## 2017-12-10 RX ADMIN — FAMOTIDINE 40 MILLIGRAM(S): 10 INJECTION INTRAVENOUS at 17:49

## 2017-12-10 RX ADMIN — Medication 90 MILLIGRAM(S): at 05:50

## 2017-12-10 RX ADMIN — Medication 100 MILLIGRAM(S): at 21:00

## 2017-12-10 NOTE — PROGRESS NOTE ADULT - SUBJECTIVE AND OBJECTIVE BOX
Surgery Progress Note    S: Patient seen and examined. No acute events overnight. Pain well controlled with current regimen.     O:  Vital Signs Last 24 Hrs  T(C): 36.8 (10 Dec 2017 05:48), Max: 36.8 (10 Dec 2017 01:39)  T(F): 98.2 (10 Dec 2017 05:48), Max: 98.2 (10 Dec 2017 01:39)  HR: 64 (10 Dec 2017 05:48) (60 - 67)  BP: 115/78 (10 Dec 2017 05:48) (114/70 - 129/79)  BP(mean): --  RR: 18 (10 Dec 2017 05:48) (17 - 18)  SpO2: 97% (10 Dec 2017 05:48) (97% - 99%)    I&O's Detail    09 Dec 2017 07:01  -  10 Dec 2017 07:00  --------------------------------------------------------  IN:  Total IN: 0 mL    OUT:    Voided: 3325 mL  Total OUT: 3325 mL    Total NET: -3325 mL          MEDICATIONS  (STANDING):  docusate sodium 100 milliGRAM(s) Oral three times a day  enoxaparin Injectable 40 milliGRAM(s) SubCutaneous daily  famotidine    Tablet 40 milliGRAM(s) Oral two times a day  NIFEdipine XL 90 milliGRAM(s) Oral daily  pantoprazole    Tablet 40 milliGRAM(s) Oral before breakfast    MEDICATIONS  (PRN):  magnesium hydroxide Suspension 30 milliLiter(s) Oral daily PRN Constipation                            12.6   7.66  )-----------( 201      ( 10 Dec 2017 05:25 )             39.1       12-10    141  |  103  |  12  ----------------------------<  103<H>  4.1   |  26  |  0.83    Ca    9.2      10 Dec 2017 05:25  Phos  4.1     12-10  Mg     2.3     12-10        Physical Exam:  Gen: Laying in bed, NAD, alert and oriented.   Resp: Unlabored breathing  Abd: soft, NTND  Extremities: b/l dopplerable DP/PT, WWP

## 2017-12-11 LAB
BUN SERPL-MCNC: 13 MG/DL — SIGNIFICANT CHANGE UP (ref 7–23)
CALCIUM SERPL-MCNC: 9.2 MG/DL — SIGNIFICANT CHANGE UP (ref 8.4–10.5)
CHLORIDE SERPL-SCNC: 100 MMOL/L — SIGNIFICANT CHANGE UP (ref 98–107)
CO2 SERPL-SCNC: 28 MMOL/L — SIGNIFICANT CHANGE UP (ref 22–31)
CREAT SERPL-MCNC: 0.84 MG/DL — SIGNIFICANT CHANGE UP (ref 0.5–1.3)
GLUCOSE SERPL-MCNC: 105 MG/DL — HIGH (ref 70–99)
HCT VFR BLD CALC: 40.1 % — SIGNIFICANT CHANGE UP (ref 39–50)
HGB BLD-MCNC: 12.8 G/DL — LOW (ref 13–17)
MAGNESIUM SERPL-MCNC: 2.5 MG/DL — SIGNIFICANT CHANGE UP (ref 1.6–2.6)
MCHC RBC-ENTMCNC: 28.3 PG — SIGNIFICANT CHANGE UP (ref 27–34)
MCHC RBC-ENTMCNC: 31.9 % — LOW (ref 32–36)
MCV RBC AUTO: 88.7 FL — SIGNIFICANT CHANGE UP (ref 80–100)
NRBC # FLD: 0 — SIGNIFICANT CHANGE UP
PHOSPHATE SERPL-MCNC: 3.6 MG/DL — SIGNIFICANT CHANGE UP (ref 2.5–4.5)
PLATELET # BLD AUTO: 205 K/UL — SIGNIFICANT CHANGE UP (ref 150–400)
PMV BLD: 10.3 FL — SIGNIFICANT CHANGE UP (ref 7–13)
POTASSIUM SERPL-MCNC: 4.5 MMOL/L — SIGNIFICANT CHANGE UP (ref 3.5–5.3)
POTASSIUM SERPL-SCNC: 4.5 MMOL/L — SIGNIFICANT CHANGE UP (ref 3.5–5.3)
RBC # BLD: 4.52 M/UL — SIGNIFICANT CHANGE UP (ref 4.2–5.8)
RBC # FLD: 14 % — SIGNIFICANT CHANGE UP (ref 10.3–14.5)
SODIUM SERPL-SCNC: 139 MMOL/L — SIGNIFICANT CHANGE UP (ref 135–145)
WBC # BLD: 7.82 K/UL — SIGNIFICANT CHANGE UP (ref 3.8–10.5)
WBC # FLD AUTO: 7.82 K/UL — SIGNIFICANT CHANGE UP (ref 3.8–10.5)

## 2017-12-11 PROCEDURE — 75635 CT ANGIO ABDOMINAL ARTERIES: CPT | Mod: 26

## 2017-12-11 PROCEDURE — 99231 SBSQ HOSP IP/OBS SF/LOW 25: CPT | Mod: 57

## 2017-12-11 PROCEDURE — 93016 CV STRESS TEST SUPVJ ONLY: CPT | Mod: GC

## 2017-12-11 PROCEDURE — 93010 ELECTROCARDIOGRAM REPORT: CPT | Mod: 76

## 2017-12-11 PROCEDURE — 99232 SBSQ HOSP IP/OBS MODERATE 35: CPT

## 2017-12-11 PROCEDURE — 78452 HT MUSCLE IMAGE SPECT MULT: CPT | Mod: 26

## 2017-12-11 PROCEDURE — 93018 CV STRESS TEST I&R ONLY: CPT | Mod: GC

## 2017-12-11 RX ORDER — DEXTROSE MONOHYDRATE, SODIUM CHLORIDE, AND POTASSIUM CHLORIDE 50; .745; 4.5 G/1000ML; G/1000ML; G/1000ML
1000 INJECTION, SOLUTION INTRAVENOUS
Qty: 0 | Refills: 0 | Status: DISCONTINUED | OUTPATIENT
Start: 2017-12-11 | End: 2017-12-11

## 2017-12-11 RX ORDER — SODIUM CHLORIDE 9 MG/ML
1000 INJECTION INTRAMUSCULAR; INTRAVENOUS; SUBCUTANEOUS
Qty: 0 | Refills: 0 | Status: DISCONTINUED | OUTPATIENT
Start: 2017-12-11 | End: 2017-12-13

## 2017-12-11 RX ADMIN — FAMOTIDINE 40 MILLIGRAM(S): 10 INJECTION INTRAVENOUS at 18:02

## 2017-12-11 RX ADMIN — SODIUM CHLORIDE 100 MILLILITER(S): 9 INJECTION INTRAMUSCULAR; INTRAVENOUS; SUBCUTANEOUS at 13:19

## 2017-12-11 RX ADMIN — Medication 100 MILLIGRAM(S): at 05:46

## 2017-12-11 RX ADMIN — DEXTROSE MONOHYDRATE, SODIUM CHLORIDE, AND POTASSIUM CHLORIDE 80 MILLILITER(S): 50; .745; 4.5 INJECTION, SOLUTION INTRAVENOUS at 07:11

## 2017-12-11 RX ADMIN — ENOXAPARIN SODIUM 40 MILLIGRAM(S): 100 INJECTION SUBCUTANEOUS at 11:08

## 2017-12-11 RX ADMIN — FAMOTIDINE 40 MILLIGRAM(S): 10 INJECTION INTRAVENOUS at 05:46

## 2017-12-11 RX ADMIN — Medication 100 MILLIGRAM(S): at 21:03

## 2017-12-11 RX ADMIN — Medication 90 MILLIGRAM(S): at 05:46

## 2017-12-11 RX ADMIN — PANTOPRAZOLE SODIUM 40 MILLIGRAM(S): 20 TABLET, DELAYED RELEASE ORAL at 06:03

## 2017-12-11 NOTE — PROGRESS NOTE ADULT - SUBJECTIVE AND OBJECTIVE BOX
Surgery Progress Note    S: Patient seen and examined. No acute events overnight. Pain well controlled with current regimen.     O:  Vital Signs Last 24 Hrs  T(C): 36.6 (11 Dec 2017 05:45), Max: 36.6 (10 Dec 2017 13:12)  T(F): 97.9 (11 Dec 2017 05:45), Max: 97.9 (10 Dec 2017 13:12)  HR: 66 (11 Dec 2017 05:45) (63 - 74)  BP: 129/68 (11 Dec 2017 05:45) (118/73 - 129/68)  BP(mean): --  RR: 17 (11 Dec 2017 05:45) (16 - 18)  SpO2: 97% (11 Dec 2017 05:45) (94% - 98%)    I&O's Detail    10 Dec 2017 07:01  -  11 Dec 2017 07:00  --------------------------------------------------------  IN:  Total IN: 0 mL    OUT:    Voided: 2100 mL  Total OUT: 2100 mL    Total NET: -2100 mL          MEDICATIONS  (STANDING):  dextrose 5% + sodium chloride 0.45% with potassium chloride 20 mEq/L 1000 milliLiter(s) (80 mL/Hr) IV Continuous <Continuous>  docusate sodium 100 milliGRAM(s) Oral three times a day  enoxaparin Injectable 40 milliGRAM(s) SubCutaneous daily  famotidine    Tablet 40 milliGRAM(s) Oral two times a day  NIFEdipine XL 90 milliGRAM(s) Oral daily  pantoprazole    Tablet 40 milliGRAM(s) Oral before breakfast    MEDICATIONS  (PRN):  magnesium hydroxide Suspension 30 milliLiter(s) Oral daily PRN Constipation                            12.8   7.82  )-----------( 205      ( 11 Dec 2017 05:50 )             40.1       12-11    139  |  100  |  13  ----------------------------<  105<H>  4.5   |  28  |  0.84    Ca    9.2      11 Dec 2017 05:50  Phos  3.6     12-11  Mg     2.5     12-11        Physical Exam:  Gen: Laying in bed, NAD, alert and oriented.   Resp: Unlabored breathing  Abd: soft, NTND  Extremities: b/l dopplerable DP/PT, WWP

## 2017-12-11 NOTE — PROGRESS NOTE ADULT - SUBJECTIVE AND OBJECTIVE BOX
Team Surgery Preop Note    Patient is a 72y old  Male who presents with a chief complaint of AAA (08 Dec 2017 00:54)    Diagnosis:  AAA, b/l common iliac aneurysms, R popilteal aneurysm    Procedure:  EVAR, coil embolization of right common iliac artery   Surgeon:  Lizzette                           [id number]   7.82  )-----------( 205      ( 11 Dec 2017 05:50 )             40.1     12-11    139  |  100  |  13  ----------------------------<  105<H>  4.5   |  28  |  0.84    Ca    9.2      11 Dec 2017 05:50  Phos  3.6     12-11  Mg     2.5     12-11            [x] Type & Screen  [x] CBC  [x] BMP  [x] PT/PTT/INR  [ ] Urinalysis  [ ] Chest X-ray  [x] EKG  [ ] NPO/IVF  [x] Consent  [x] Clearance  [x] Added on to OR Schedule  [ ] Anti-coagulation held  [ ] MRSA/MSSA Nasal Screen Team C Surgery Preop Note    Patient is a 72y old  Male who presents with a chief complaint of AAA (08 Dec 2017 00:54)    Diagnosis:  AAA, b/l common iliac aneurysms, R popliteal aneurysm    Procedure:  EVAR, coil embolization of right common iliac artery   Surgeon:  Lizzette                           12.8   7.82  )-----------( 205      ( 11 Dec 2017 05:50 )             40.1     12-11    139  |  100  |  13  ----------------------------<  105<H>  4.5   |  28  |  0.84    Ca    9.2      11 Dec 2017 05:50  Phos  3.6     12-11  Mg     2.5     12-11            [x] Type & Screen  [x] CBC  [x] BMP  [x] PT/PTT/INR  [ ] Urinalysis  [ ] Chest X-ray  [x] EKG  [ ] NPO/IVF  [x] Consent  [x] Clearance  [x] Added on to OR Schedule  [ ] Anti-coagulation held  [ ] MRSA/MSSA Nasal Screen

## 2017-12-12 LAB
APTT BLD: 32.8 SEC — SIGNIFICANT CHANGE UP (ref 27.5–37.4)
BASE EXCESS BLDA CALC-SCNC: -0.5 MMOL/L — SIGNIFICANT CHANGE UP
BASE EXCESS BLDA CALC-SCNC: 0.3 MMOL/L — SIGNIFICANT CHANGE UP
BASE EXCESS BLDA CALC-SCNC: 1.8 MMOL/L — SIGNIFICANT CHANGE UP
BLD GP AB SCN SERPL QL: NEGATIVE — SIGNIFICANT CHANGE UP
BUN SERPL-MCNC: 10 MG/DL — SIGNIFICANT CHANGE UP (ref 7–23)
BUN SERPL-MCNC: 9 MG/DL — SIGNIFICANT CHANGE UP (ref 7–23)
CA-I BLDA-SCNC: 1.09 MMOL/L — LOW (ref 1.15–1.29)
CA-I BLDA-SCNC: 1.11 MMOL/L — LOW (ref 1.15–1.29)
CA-I BLDA-SCNC: 1.18 MMOL/L — SIGNIFICANT CHANGE UP (ref 1.15–1.29)
CALCIUM SERPL-MCNC: 8.1 MG/DL — LOW (ref 8.4–10.5)
CALCIUM SERPL-MCNC: 8.9 MG/DL — SIGNIFICANT CHANGE UP (ref 8.4–10.5)
CHLORIDE SERPL-SCNC: 102 MMOL/L — SIGNIFICANT CHANGE UP (ref 98–107)
CHLORIDE SERPL-SCNC: 106 MMOL/L — SIGNIFICANT CHANGE UP (ref 98–107)
CO2 SERPL-SCNC: 25 MMOL/L — SIGNIFICANT CHANGE UP (ref 22–31)
CO2 SERPL-SCNC: 25 MMOL/L — SIGNIFICANT CHANGE UP (ref 22–31)
CREAT SERPL-MCNC: 0.71 MG/DL — SIGNIFICANT CHANGE UP (ref 0.5–1.3)
CREAT SERPL-MCNC: 0.91 MG/DL — SIGNIFICANT CHANGE UP (ref 0.5–1.3)
GLUCOSE BLDA-MCNC: 120 MG/DL — HIGH (ref 70–99)
GLUCOSE BLDA-MCNC: 95 MG/DL — SIGNIFICANT CHANGE UP (ref 70–99)
GLUCOSE BLDA-MCNC: 99 MG/DL — SIGNIFICANT CHANGE UP (ref 70–99)
GLUCOSE SERPL-MCNC: 100 MG/DL — HIGH (ref 70–99)
GLUCOSE SERPL-MCNC: 95 MG/DL — SIGNIFICANT CHANGE UP (ref 70–99)
HCO3 BLDA-SCNC: 24 MMOL/L — SIGNIFICANT CHANGE UP (ref 22–26)
HCO3 BLDA-SCNC: 25 MMOL/L — SIGNIFICANT CHANGE UP (ref 22–26)
HCO3 BLDA-SCNC: 26 MMOL/L — SIGNIFICANT CHANGE UP (ref 22–26)
HCT VFR BLD CALC: 35.8 % — LOW (ref 39–50)
HCT VFR BLD CALC: 43.2 % — SIGNIFICANT CHANGE UP (ref 39–50)
HCT VFR BLDA CALC: 34.2 % — LOW (ref 39–51)
HCT VFR BLDA CALC: 34.7 % — LOW (ref 39–51)
HCT VFR BLDA CALC: 40 % — SIGNIFICANT CHANGE UP (ref 39–51)
HGB BLD-MCNC: 11.4 G/DL — LOW (ref 13–17)
HGB BLD-MCNC: 13.5 G/DL — SIGNIFICANT CHANGE UP (ref 13–17)
HGB BLDA-MCNC: 11.1 G/DL — LOW (ref 13–17)
HGB BLDA-MCNC: 11.2 G/DL — LOW (ref 13–17)
HGB BLDA-MCNC: 13 G/DL — SIGNIFICANT CHANGE UP (ref 13–17)
INR BLD: 0.99 — SIGNIFICANT CHANGE UP (ref 0.88–1.17)
MAGNESIUM SERPL-MCNC: 2.4 MG/DL — SIGNIFICANT CHANGE UP (ref 1.6–2.6)
MCHC RBC-ENTMCNC: 28.4 PG — SIGNIFICANT CHANGE UP (ref 27–34)
MCHC RBC-ENTMCNC: 28.4 PG — SIGNIFICANT CHANGE UP (ref 27–34)
MCHC RBC-ENTMCNC: 31.3 % — LOW (ref 32–36)
MCHC RBC-ENTMCNC: 31.8 % — LOW (ref 32–36)
MCV RBC AUTO: 89.3 FL — SIGNIFICANT CHANGE UP (ref 80–100)
MCV RBC AUTO: 90.9 FL — SIGNIFICANT CHANGE UP (ref 80–100)
NRBC # FLD: 0 — SIGNIFICANT CHANGE UP
NRBC # FLD: 0 — SIGNIFICANT CHANGE UP
PCO2 BLDA: 35 MMHG — SIGNIFICANT CHANGE UP (ref 35–48)
PCO2 BLDA: 37 MMHG — SIGNIFICANT CHANGE UP (ref 35–48)
PCO2 BLDA: 42 MMHG — SIGNIFICANT CHANGE UP (ref 35–48)
PH BLDA: 7.41 PH — SIGNIFICANT CHANGE UP (ref 7.35–7.45)
PH BLDA: 7.42 PH — SIGNIFICANT CHANGE UP (ref 7.35–7.45)
PH BLDA: 7.44 PH — SIGNIFICANT CHANGE UP (ref 7.35–7.45)
PHOSPHATE SERPL-MCNC: 4 MG/DL — SIGNIFICANT CHANGE UP (ref 2.5–4.5)
PLATELET # BLD AUTO: 163 K/UL — SIGNIFICANT CHANGE UP (ref 150–400)
PLATELET # BLD AUTO: 210 K/UL — SIGNIFICANT CHANGE UP (ref 150–400)
PMV BLD: 10.3 FL — SIGNIFICANT CHANGE UP (ref 7–13)
PMV BLD: 9.7 FL — SIGNIFICANT CHANGE UP (ref 7–13)
PO2 BLDA: 167 MMHG — HIGH (ref 83–108)
PO2 BLDA: 66 MMHG — LOW (ref 83–108)
PO2 BLDA: 97 MMHG — SIGNIFICANT CHANGE UP (ref 83–108)
POTASSIUM BLDA-SCNC: 3.9 MMOL/L — SIGNIFICANT CHANGE UP (ref 3.4–4.5)
POTASSIUM BLDA-SCNC: 4 MMOL/L — SIGNIFICANT CHANGE UP (ref 3.4–4.5)
POTASSIUM BLDA-SCNC: 4.2 MMOL/L — SIGNIFICANT CHANGE UP (ref 3.4–4.5)
POTASSIUM SERPL-MCNC: 3.9 MMOL/L — SIGNIFICANT CHANGE UP (ref 3.5–5.3)
POTASSIUM SERPL-MCNC: 4.4 MMOL/L — SIGNIFICANT CHANGE UP (ref 3.5–5.3)
POTASSIUM SERPL-SCNC: 3.9 MMOL/L — SIGNIFICANT CHANGE UP (ref 3.5–5.3)
POTASSIUM SERPL-SCNC: 4.4 MMOL/L — SIGNIFICANT CHANGE UP (ref 3.5–5.3)
PROTHROM AB SERPL-ACNC: 11.4 SEC — SIGNIFICANT CHANGE UP (ref 9.8–13.1)
RBC # BLD: 4.01 M/UL — LOW (ref 4.2–5.8)
RBC # BLD: 4.75 M/UL — SIGNIFICANT CHANGE UP (ref 4.2–5.8)
RBC # FLD: 13.8 % — SIGNIFICANT CHANGE UP (ref 10.3–14.5)
RBC # FLD: 14 % — SIGNIFICANT CHANGE UP (ref 10.3–14.5)
RH IG SCN BLD-IMP: POSITIVE — SIGNIFICANT CHANGE UP
SAO2 % BLDA: 92.4 % — LOW (ref 95–99)
SAO2 % BLDA: 97.7 % — SIGNIFICANT CHANGE UP (ref 95–99)
SAO2 % BLDA: 99 % — SIGNIFICANT CHANGE UP (ref 95–99)
SODIUM BLDA-SCNC: 137 MMOL/L — SIGNIFICANT CHANGE UP (ref 136–146)
SODIUM BLDA-SCNC: 138 MMOL/L — SIGNIFICANT CHANGE UP (ref 136–146)
SODIUM BLDA-SCNC: 138 MMOL/L — SIGNIFICANT CHANGE UP (ref 136–146)
SODIUM SERPL-SCNC: 141 MMOL/L — SIGNIFICANT CHANGE UP (ref 135–145)
SODIUM SERPL-SCNC: 143 MMOL/L — SIGNIFICANT CHANGE UP (ref 135–145)
WBC # BLD: 7.35 K/UL — SIGNIFICANT CHANGE UP (ref 3.8–10.5)
WBC # BLD: 9.43 K/UL — SIGNIFICANT CHANGE UP (ref 3.8–10.5)
WBC # FLD AUTO: 7.35 K/UL — SIGNIFICANT CHANGE UP (ref 3.8–10.5)
WBC # FLD AUTO: 9.43 K/UL — SIGNIFICANT CHANGE UP (ref 3.8–10.5)

## 2017-12-12 PROCEDURE — 36200 PLACE CATHETER IN AORTA: CPT | Mod: 59

## 2017-12-12 PROCEDURE — 34825: CPT

## 2017-12-12 PROCEDURE — 75716 ARTERY X-RAYS ARMS/LEGS: CPT | Mod: 26,59

## 2017-12-12 PROCEDURE — 34803: CPT

## 2017-12-12 PROCEDURE — 76937 US GUIDE VASCULAR ACCESS: CPT | Mod: 26

## 2017-12-12 PROCEDURE — 93010 ELECTROCARDIOGRAM REPORT: CPT

## 2017-12-12 PROCEDURE — 37242 VASC EMBOLIZE/OCCLUDE ARTERY: CPT

## 2017-12-12 PROCEDURE — 75630 X-RAY AORTA LEG ARTERIES: CPT | Mod: 26,59

## 2017-12-12 PROCEDURE — 36245 INS CATH ABD/L-EXT ART 1ST: CPT

## 2017-12-12 RX ORDER — ONDANSETRON 8 MG/1
4 TABLET, FILM COATED ORAL ONCE
Qty: 0 | Refills: 0 | Status: COMPLETED | OUTPATIENT
Start: 2017-12-12 | End: 2017-12-12

## 2017-12-12 RX ORDER — ASPIRIN/CALCIUM CARB/MAGNESIUM 324 MG
81 TABLET ORAL DAILY
Qty: 0 | Refills: 0 | Status: DISCONTINUED | OUTPATIENT
Start: 2017-12-12 | End: 2017-12-14

## 2017-12-12 RX ORDER — HYDROMORPHONE HYDROCHLORIDE 2 MG/ML
1 INJECTION INTRAMUSCULAR; INTRAVENOUS; SUBCUTANEOUS
Qty: 0 | Refills: 0 | Status: DISCONTINUED | OUTPATIENT
Start: 2017-12-12 | End: 2017-12-13

## 2017-12-12 RX ORDER — HYDROMORPHONE HYDROCHLORIDE 2 MG/ML
1 INJECTION INTRAMUSCULAR; INTRAVENOUS; SUBCUTANEOUS
Qty: 0 | Refills: 0 | Status: DISCONTINUED | OUTPATIENT
Start: 2017-12-12 | End: 2017-12-12

## 2017-12-12 RX ORDER — OXYCODONE AND ACETAMINOPHEN 5; 325 MG/1; MG/1
1 TABLET ORAL EVERY 4 HOURS
Qty: 0 | Refills: 0 | Status: DISCONTINUED | OUTPATIENT
Start: 2017-12-12 | End: 2017-12-13

## 2017-12-12 RX ORDER — SIMVASTATIN 20 MG/1
10 TABLET, FILM COATED ORAL AT BEDTIME
Qty: 0 | Refills: 0 | Status: DISCONTINUED | OUTPATIENT
Start: 2017-12-12 | End: 2017-12-14

## 2017-12-12 RX ORDER — HEPARIN SODIUM 5000 [USP'U]/ML
5000 INJECTION INTRAVENOUS; SUBCUTANEOUS EVERY 8 HOURS
Qty: 0 | Refills: 0 | Status: DISCONTINUED | OUTPATIENT
Start: 2017-12-12 | End: 2017-12-13

## 2017-12-12 RX ORDER — SODIUM CHLORIDE 9 MG/ML
1000 INJECTION INTRAMUSCULAR; INTRAVENOUS; SUBCUTANEOUS
Qty: 0 | Refills: 0 | Status: DISCONTINUED | OUTPATIENT
Start: 2017-12-12 | End: 2017-12-14

## 2017-12-12 RX ADMIN — ONDANSETRON 4 MILLIGRAM(S): 8 TABLET, FILM COATED ORAL at 13:00

## 2017-12-12 RX ADMIN — SODIUM CHLORIDE 100 MILLILITER(S): 9 INJECTION INTRAMUSCULAR; INTRAVENOUS; SUBCUTANEOUS at 13:17

## 2017-12-12 RX ADMIN — FAMOTIDINE 40 MILLIGRAM(S): 10 INJECTION INTRAVENOUS at 05:09

## 2017-12-12 RX ADMIN — Medication 100 MILLIGRAM(S): at 21:39

## 2017-12-12 RX ADMIN — SIMVASTATIN 10 MILLIGRAM(S): 20 TABLET, FILM COATED ORAL at 21:39

## 2017-12-12 RX ADMIN — Medication 90 MILLIGRAM(S): at 05:09

## 2017-12-12 RX ADMIN — SODIUM CHLORIDE 100 MILLILITER(S): 9 INJECTION INTRAMUSCULAR; INTRAVENOUS; SUBCUTANEOUS at 05:09

## 2017-12-12 RX ADMIN — FAMOTIDINE 40 MILLIGRAM(S): 10 INJECTION INTRAVENOUS at 18:11

## 2017-12-12 RX ADMIN — Medication 81 MILLIGRAM(S): at 18:11

## 2017-12-12 RX ADMIN — Medication 100 MILLIGRAM(S): at 05:09

## 2017-12-12 NOTE — CHART NOTE - NSCHARTNOTEFT_GEN_A_CORE
Cardiology called for concern for new atrial fibrillation from nuclear stress test report.  Bedside 12-lead EKG done consistent with NSR with APC's HR 70's; patient asymptomatic and denies chest pain; No cardiology interventions at this time

## 2017-12-12 NOTE — BRIEF OPERATIVE NOTE - PROCEDURE
<<-----Click on this checkbox to enter Procedure AAA endovascular repair  12/12/2017    Active  [id number]

## 2017-12-12 NOTE — PROGRESS NOTE ADULT - SUBJECTIVE AND OBJECTIVE BOX
Surgery Progress Note    S: Patient seen and examined. No acute events overnight. Pain well controlled with current regimen.     O:  Vital Signs Last 24 Hrs  T(C): 36.7 (12 Dec 2017 12:55), Max: 37 (11 Dec 2017 22:22)  T(F): 98.1 (12 Dec 2017 12:55), Max: 98.6 (11 Dec 2017 22:22)  HR: 59 (12 Dec 2017 14:00) (59 - 79)  BP: 141/80 (12 Dec 2017 14:00) (107/67 - 154/86)  BP(mean): --  RR: 15 (12 Dec 2017 14:00) (13 - 18)  SpO2: 97% (12 Dec 2017 14:00) (94% - 100%)    I&O's Detail    11 Dec 2017 07:01  -  12 Dec 2017 07:00  --------------------------------------------------------  IN:    sodium chloride 0.9%.: 1800 mL  Total IN: 1800 mL    OUT:    Voided: 3750 mL  Total OUT: 3750 mL    Total NET: -1950 mL      12 Dec 2017 07:01  -  12 Dec 2017 14:04  --------------------------------------------------------  IN:    sodium chloride 0.9%.: 100 mL  Total IN: 100 mL    OUT:  Total OUT: 0 mL    Total NET: 100 mL          MEDICATIONS  (STANDING):  aspirin enteric coated 81 milliGRAM(s) Oral daily  docusate sodium 100 milliGRAM(s) Oral three times a day  famotidine    Tablet 40 milliGRAM(s) Oral two times a day  heparin  Injectable 5000 Unit(s) SubCutaneous every 8 hours  NIFEdipine XL 90 milliGRAM(s) Oral daily  pantoprazole    Tablet 40 milliGRAM(s) Oral before breakfast  simvastatin 10 milliGRAM(s) Oral at bedtime  sodium chloride 0.9%. 1000 milliLiter(s) (100 mL/Hr) IV Continuous <Continuous>  sodium chloride 0.9%. 1000 milliLiter(s) (100 mL/Hr) IV Continuous <Continuous>    MEDICATIONS  (PRN):  HYDROmorphone  Injectable 1 milliGRAM(s) IV Push every 10 minutes PRN Breakthrough pain  magnesium hydroxide Suspension 30 milliLiter(s) Oral daily PRN Constipation  oxyCODONE    5 mG/acetaminophen 325 mG 1 Tablet(s) Oral every 4 hours PRN Moderate Pain                            13.5   7.35  )-----------( 210      ( 12 Dec 2017 06:00 )             43.2       12-12    141  |  102  |  10  ----------------------------<  100<H>  4.4   |  25  |  0.91    Ca    8.9      12 Dec 2017 06:00  Phos  4.0     12-12  Mg     2.4     12-12        Physical Exam:  Gen: Laying in bed, NAD, alert and oriented.   Resp: Unlabored breathing  Abd: soft, NTND  Extremities: b/l dopplerable DP/PT, WWP

## 2017-12-12 NOTE — CHART NOTE - NSCHARTNOTEFT_GEN_A_CORE
POST-OPERATIVE NOTE    Patient is a 72y old  Male who presents with a chief complaint of AAA (08 Dec 2017 00:54)    Pt is S/P AAA endovascular repair.          Vital Signs Last 24 Hrs  T(C): 36.2 (12 Dec 2017 17:00), Max: 37 (11 Dec 2017 22:22)  T(F): 97.2 (12 Dec 2017 17:00), Max: 98.6 (11 Dec 2017 22:22)  HR: 65 (12 Dec 2017 18:00) (58 - 79)  BP: 153/90 (12 Dec 2017 18:00) (107/67 - 154/86)  BP(mean): 92 (12 Dec 2017 14:30) (92 - 92)  RR: 15 (12 Dec 2017 18:00) (13 - 18)  SpO2: 99% (12 Dec 2017 18:00) (94% - 100%)  I&O's Detail    11 Dec 2017 07:01  -  12 Dec 2017 07:00  --------------------------------------------------------  IN:    sodium chloride 0.9%.: 1800 mL  Total IN: 1800 mL    OUT:    Voided: 3750 mL  Total OUT: 3750 mL    Total NET: -1950 mL      12 Dec 2017 07:01  -  12 Dec 2017 18:08  --------------------------------------------------------  IN:    Oral Fluid: 840 mL    sodium chloride 0.9%.: 500 mL  Total IN: 1340 mL    OUT:    Indwelling Catheter - Urethral: 500 mL  Total OUT: 500 mL    Total NET: 840 mL        aspirin enteric coated 81  heparin  Injectable 5000  NIFEdipine XL 90    PAST MEDICAL & SURGICAL HISTORY:  HTN (hypertension)  No significant past surgical history    Physical Exam:  General: NAD, resting comfortably in bed  Pulmonary: Nonlabored breathing, no respiratory distress  Cardiovascular: NSR  Abdominal: soft, NT/ND  Extremities: WWP. B/l groin incisions c/d/i. No evidence of hematoma. PT and DP doppler signals present b/l.      LABS:                        11.4   9.43  )-----------( 163      ( 12 Dec 2017 13:35 )             35.8     12-12    143  |  106  |  9   ----------------------------<  95  3.9   |  25  |  0.71    Ca    8.1<L>      12 Dec 2017 13:35  Phos  4.0     12-12  Mg     2.4     12-12      PT/INR - ( 12 Dec 2017 06:00 )   PT: 11.4 SEC;   INR: 0.99          PTT - ( 12 Dec 2017 06:00 )  PTT:32.8 SEC  CAPILLARY BLOOD GLUCOSE          Radiology and Additional Studies:    Assessment:[id number]:  70 yo man with HTN and actively smoking who was referred to the ED by his gastroenterologist for an incidentally found AAA on CT abdomen. The patient was undergoing a colonoscopy but the scope was unable to be passed so the patient underwent a CT abdomen. The CT indicates a 5.3 cm infrarenal AAA, 3.9 cm R common iliac aneurysm and a 2.2 cm left common iliac artery aneurysm. He denies abdominal pain. He reports some pain in his RLE when he walks but expresses that this pain is where he had a prior orthopedic repair.     PMH: HTN, possible gastric ulcer history. Of note, patient does not remember the name of his GI physician.   PSH: RLE fracture 2/2 trauma s/p unknown repair. No other operations.     Medications reviewed with [facility] pharmacy. (08 Dec 2017 00:54)    Assessment:  Pt is S/P AAA endovascular repair, recovering well.    Plan:  - Pain control  - Patient to lay flat for 4 hours post-op  - Ok to go to floor after 6 hours in PACU  - Serial LE pulse exams   - Restart ASA tonight  - Cotinue with tmi maxwell d/c 48 hours post-op  - DVT ppx with SQH & SCDs    Gil Ordoñez, PGY-1  C Team Surgery [id number]

## 2017-12-13 ENCOUNTER — TRANSCRIPTION ENCOUNTER (OUTPATIENT)
Age: 72
End: 2017-12-13

## 2017-12-13 LAB
BASOPHILS # BLD AUTO: 0.04 K/UL — SIGNIFICANT CHANGE UP (ref 0–0.2)
BASOPHILS NFR BLD AUTO: 0.4 % — SIGNIFICANT CHANGE UP (ref 0–2)
BUN SERPL-MCNC: 6 MG/DL — LOW (ref 7–23)
CALCIUM SERPL-MCNC: 8.3 MG/DL — LOW (ref 8.4–10.5)
CHLORIDE SERPL-SCNC: 103 MMOL/L — SIGNIFICANT CHANGE UP (ref 98–107)
CO2 SERPL-SCNC: 25 MMOL/L — SIGNIFICANT CHANGE UP (ref 22–31)
CREAT SERPL-MCNC: 0.77 MG/DL — SIGNIFICANT CHANGE UP (ref 0.5–1.3)
EOSINOPHIL # BLD AUTO: 0.28 K/UL — SIGNIFICANT CHANGE UP (ref 0–0.5)
EOSINOPHIL NFR BLD AUTO: 2.6 % — SIGNIFICANT CHANGE UP (ref 0–6)
GLUCOSE SERPL-MCNC: 117 MG/DL — HIGH (ref 70–99)
HCT VFR BLD CALC: 36.1 % — LOW (ref 39–50)
HGB BLD-MCNC: 11.6 G/DL — LOW (ref 13–17)
IMM GRANULOCYTES # BLD AUTO: 0.03 # — SIGNIFICANT CHANGE UP
IMM GRANULOCYTES NFR BLD AUTO: 0.3 % — SIGNIFICANT CHANGE UP (ref 0–1.5)
LYMPHOCYTES # BLD AUTO: 19.8 % — SIGNIFICANT CHANGE UP (ref 13–44)
LYMPHOCYTES # BLD AUTO: 2.14 K/UL — SIGNIFICANT CHANGE UP (ref 1–3.3)
MCHC RBC-ENTMCNC: 29.3 PG — SIGNIFICANT CHANGE UP (ref 27–34)
MCHC RBC-ENTMCNC: 32.1 % — SIGNIFICANT CHANGE UP (ref 32–36)
MCV RBC AUTO: 91.2 FL — SIGNIFICANT CHANGE UP (ref 80–100)
MONOCYTES # BLD AUTO: 1.22 K/UL — HIGH (ref 0–0.9)
MONOCYTES NFR BLD AUTO: 11.3 % — SIGNIFICANT CHANGE UP (ref 2–14)
NEUTROPHILS # BLD AUTO: 7.12 K/UL — SIGNIFICANT CHANGE UP (ref 1.8–7.4)
NEUTROPHILS NFR BLD AUTO: 65.6 % — SIGNIFICANT CHANGE UP (ref 43–77)
NRBC # FLD: 0 — SIGNIFICANT CHANGE UP
PLATELET # BLD AUTO: 162 K/UL — SIGNIFICANT CHANGE UP (ref 150–400)
PMV BLD: 10.8 FL — SIGNIFICANT CHANGE UP (ref 7–13)
POTASSIUM SERPL-MCNC: 4.3 MMOL/L — SIGNIFICANT CHANGE UP (ref 3.5–5.3)
POTASSIUM SERPL-SCNC: 4.3 MMOL/L — SIGNIFICANT CHANGE UP (ref 3.5–5.3)
RBC # BLD: 3.96 M/UL — LOW (ref 4.2–5.8)
RBC # FLD: 14.1 % — SIGNIFICANT CHANGE UP (ref 10.3–14.5)
SODIUM SERPL-SCNC: 140 MMOL/L — SIGNIFICANT CHANGE UP (ref 135–145)
WBC # BLD: 10.83 K/UL — HIGH (ref 3.8–10.5)
WBC # FLD AUTO: 10.83 K/UL — HIGH (ref 3.8–10.5)

## 2017-12-13 PROCEDURE — 71010: CPT | Mod: 26

## 2017-12-13 PROCEDURE — 74000: CPT | Mod: 26

## 2017-12-13 RX ORDER — NIFEDIPINE 30 MG
1 TABLET, EXTENDED RELEASE 24 HR ORAL
Qty: 60 | Refills: 0 | OUTPATIENT
Start: 2017-12-13 | End: 2018-02-10

## 2017-12-13 RX ORDER — MAGNESIUM HYDROXIDE 400 MG/1
30 TABLET, CHEWABLE ORAL
Qty: 0 | Refills: 0 | COMMUNITY
Start: 2017-12-13

## 2017-12-13 RX ORDER — SIMVASTATIN 20 MG/1
1 TABLET, FILM COATED ORAL
Qty: 60 | Refills: 0 | OUTPATIENT
Start: 2017-12-13 | End: 2018-02-10

## 2017-12-13 RX ORDER — ASPIRIN/CALCIUM CARB/MAGNESIUM 324 MG
1 TABLET ORAL
Qty: 60 | Refills: 0 | OUTPATIENT
Start: 2017-12-13 | End: 2018-02-10

## 2017-12-13 RX ORDER — NIFEDIPINE 30 MG
1 TABLET, EXTENDED RELEASE 24 HR ORAL
Qty: 0 | Refills: 0 | COMMUNITY

## 2017-12-13 RX ORDER — METOCLOPRAMIDE HCL 10 MG
10 TABLET ORAL
Qty: 0 | Refills: 0 | Status: DISCONTINUED | OUTPATIENT
Start: 2017-12-13 | End: 2017-12-14

## 2017-12-13 RX ORDER — HEPARIN SODIUM 5000 [USP'U]/ML
5000 INJECTION INTRAVENOUS; SUBCUTANEOUS EVERY 8 HOURS
Qty: 0 | Refills: 0 | Status: DISCONTINUED | OUTPATIENT
Start: 2017-12-13 | End: 2017-12-14

## 2017-12-13 RX ORDER — OXYCODONE AND ACETAMINOPHEN 5; 325 MG/1; MG/1
1 TABLET ORAL EVERY 4 HOURS
Qty: 0 | Refills: 0 | Status: DISCONTINUED | OUTPATIENT
Start: 2017-12-13 | End: 2017-12-14

## 2017-12-13 RX ADMIN — SODIUM CHLORIDE 100 MILLILITER(S): 9 INJECTION INTRAMUSCULAR; INTRAVENOUS; SUBCUTANEOUS at 05:40

## 2017-12-13 RX ADMIN — Medication 81 MILLIGRAM(S): at 12:11

## 2017-12-13 RX ADMIN — SIMVASTATIN 10 MILLIGRAM(S): 20 TABLET, FILM COATED ORAL at 22:19

## 2017-12-13 RX ADMIN — SODIUM CHLORIDE 75 MILLILITER(S): 9 INJECTION INTRAMUSCULAR; INTRAVENOUS; SUBCUTANEOUS at 17:20

## 2017-12-13 RX ADMIN — SODIUM CHLORIDE 100 MILLILITER(S): 9 INJECTION INTRAMUSCULAR; INTRAVENOUS; SUBCUTANEOUS at 12:11

## 2017-12-13 RX ADMIN — Medication 100 MILLIGRAM(S): at 14:51

## 2017-12-13 RX ADMIN — HEPARIN SODIUM 5000 UNIT(S): 5000 INJECTION INTRAVENOUS; SUBCUTANEOUS at 05:36

## 2017-12-13 RX ADMIN — OXYCODONE AND ACETAMINOPHEN 1 TABLET(S): 5; 325 TABLET ORAL at 22:18

## 2017-12-13 RX ADMIN — PANTOPRAZOLE SODIUM 40 MILLIGRAM(S): 20 TABLET, DELAYED RELEASE ORAL at 05:36

## 2017-12-13 RX ADMIN — FAMOTIDINE 40 MILLIGRAM(S): 10 INJECTION INTRAVENOUS at 17:20

## 2017-12-13 RX ADMIN — Medication 100 MILLIGRAM(S): at 22:19

## 2017-12-13 RX ADMIN — SODIUM CHLORIDE 75 MILLILITER(S): 9 INJECTION INTRAMUSCULAR; INTRAVENOUS; SUBCUTANEOUS at 22:18

## 2017-12-13 RX ADMIN — OXYCODONE AND ACETAMINOPHEN 1 TABLET(S): 5; 325 TABLET ORAL at 23:18

## 2017-12-13 RX ADMIN — HEPARIN SODIUM 5000 UNIT(S): 5000 INJECTION INTRAVENOUS; SUBCUTANEOUS at 22:19

## 2017-12-13 RX ADMIN — Medication 100 MILLIGRAM(S): at 05:36

## 2017-12-13 RX ADMIN — Medication 90 MILLIGRAM(S): at 05:36

## 2017-12-13 RX ADMIN — HEPARIN SODIUM 5000 UNIT(S): 5000 INJECTION INTRAVENOUS; SUBCUTANEOUS at 14:53

## 2017-12-13 RX ADMIN — FAMOTIDINE 40 MILLIGRAM(S): 10 INJECTION INTRAVENOUS at 05:36

## 2017-12-13 NOTE — DISCHARGE NOTE ADULT - ADDITIONAL INSTRUCTIONS
PRESCRIPTIONS  1) Prescriptions for 4 medications have been sent to your pharmacy listed in the computer ([facility] in [facility]). These include aspirin, simvastatin, nifedipine, and percocet. Please take these medications as indicated on the pill bottles.     FOLLOW UP  1) Please make an appointment to follow up with Dr. Crocker in 7-10 days after your discharge. You may call her office to make an appointment at: [phone]. PRESCRIPTIONS  1) Prescriptions for 4 medications have been sent to your pharmacy listed in the computer ([facility] in [facility]). These include aspirin, simvastatin, nifedipine, and percocet. Please take these medications as indicated on the pill bottles.     FOLLOW UP  1) Please make an appointment to follow up with Dr. Crocker in 7-10 days after your discharge. You may call her office to make an appointment at: [phone].  2.) Follow up with gastroenterologist

## 2017-12-13 NOTE — DISCHARGE NOTE ADULT - PLAN OF CARE
To return to daily living activity prior to surgery, postoperative recovery. See below. Pain control A prescription for percocet has been sent to your pharmacy. You should only take these for severe pain. For mild or moderate pain, you may take 650mg of tylenol every 6 hours. You may also take ibuprofen 400mg every 6 hours. It is most effective to alternate these two medicines with each other, 3 hours apart.

## 2017-12-13 NOTE — DISCHARGE NOTE ADULT - HOSPITAL COURSE
HPI:  70 yo man with HTN and actively smoking who was referred to the ED by his gastroenterologist for an incidentally found AAA on CT abdomen. The patient was undergoing a colonoscopy but the scope was unable to be passed so the patient underwent a CT abdomen. The CT indicates a 5.3 cm infrarenal AAA, 3.9 cm R common iliac aneurysm and a 2.2 cm left common iliac artery aneurysm. He denies abdominal pain. He reports some pain in his RLE when he walks but expresses that this pain is where he had a prior orthopedic repair.     Hospital Course:  The patient was admitted on 12/7/17 for a 5.3 cm infrarenal abdominal aortic aneurysm incidentally noted on CT of the abdomen. On hospital days 1-6 he underwent further imaging and blood pressure management in preparation for eventual surgical intervention. On 12/12/17 the patient went to the OR to undergo an EVAR with right hypogastric embolization. The surgery went uncomplicated and the patient tolerated it well. Post-operatively he was transferred to the PACU, and later that evening back to the floor. On POD1 he was out of bed, his pain was well controlled, and he was tolerating a regular diet. On POD1 he was stable for discharge to home in excellent condition. HPI:  70 yo man with HTN and actively smoking who was referred to the ED by his gastroenterologist for an incidentally found AAA on CT abdomen. The patient was undergoing a colonoscopy but the scope was unable to be passed so the patient underwent a CT abdomen. The CT indicates a 5.3 cm infrarenal AAA, 3.9 cm R common iliac aneurysm and a 2.2 cm left common iliac artery aneurysm. He denies abdominal pain. He reports some pain in his RLE when he walks but expresses that this pain is where he had a prior orthopedic repair.     Hospital Course:  The patient was admitted on 12/7/17 for a 5.3 cm infrarenal abdominal aortic aneurysm incidentally noted on CT of the abdomen. On hospital days 1-6 he underwent further imaging and blood pressure management in preparation for eventual surgical intervention. On 12/12/17 the patient went to the OR to undergo an EVAR with right hypogastric embolization. The surgery went uncomplicated and the patient tolerated it well. Post-operatively he was transferred to the PACU, and later that evening back to the floor. On POD2 he was out of bed, his pain was well controlled, and he was tolerating a regular diet. On POD1 he was stable for discharge to home in excellent condition.

## 2017-12-13 NOTE — DISCHARGE NOTE ADULT - CARE PLAN
Principal Discharge DX:	AAA (abdominal aortic aneurysm) without rupture  Goal:	To return to daily living activity prior to surgery, postoperative recovery.  Instructions for follow-up, activity and diet:	See below.  Goal:	Pain control  Instructions for follow-up, activity and diet:	A prescription for percocet has been sent to your pharmacy. You should only take these for severe pain. For mild or moderate pain, you may take 650mg of tylenol every 6 hours. You may also take ibuprofen 400mg every 6 hours. It is most effective to alternate these two medicines with each other, 3 hours apart.

## 2017-12-13 NOTE — PROGRESS NOTE ADULT - SUBJECTIVE AND OBJECTIVE BOX
C Team Surgery Progress Note    S: pt s/p EVAR yesterday. No acute events overnight. Pain well controlled with current regimen. pt diet advance. c/o regurgitating food and liquid. States this has been going on for approximately 4 months. Pt Gastroenterologist Dr. Jen Powell [phone] is aware and pt was scheduled for endoscopy today which he had to cancel to hospitalization.     O:    T(C): 36.4 (12-13-17 @ 11:28), Max: 36.7 (12-12-17 @ 20:50)  HR: 69 (12-13-17 @ 11:28) (65 - 80)  BP: 121/67 (12-13-17 @ 11:28) (121/67 - 153/90)  ABP: --  ABP(mean): --  RR: 18 (12-13-17 @ 11:28) (15 - 18)  SpO2: 95% (12-13-17 @ 11:28) (94% - 99%)  Wt(kg): --  CVP(mm Hg): --      12-12 @ 07:01  -  12-13 @ 07:00  --------------------------------------------------------  IN:    Oral Fluid: 960 mL    sodium chloride 0.9%: 400 mL    sodium chloride 0.9%.: 1400 mL  Total IN: 2760 mL    OUT:    Indwelling Catheter - Urethral: 2650 mL  Total OUT: 2650 mL    Total NET: 110 mL      12-13 @ 07:01  -  12-13 @ 17:30  --------------------------------------------------------  IN:    Oral Fluid: 600 mL    sodium chloride 0.9%.: 950 mL  Total IN: 1550 mL    OUT:    Indwelling Catheter - Urethral: 400 mL    Voided: 2000 mL  Total OUT: 2400 mL    Total NET: -850 mL      MEDICATIONS  (STANDING):  aspirin enteric coated 81 milliGRAM(s) Oral daily  docusate sodium 100 milliGRAM(s) Oral three times a day  famotidine    Tablet 40 milliGRAM(s) Oral two times a day  heparin  Injectable 5000 Unit(s) SubCutaneous every 8 hours  NIFEdipine XL 90 milliGRAM(s) Oral daily  pantoprazole    Tablet 40 milliGRAM(s) Oral before breakfast  simvastatin 10 milliGRAM(s) Oral at bedtime  sodium chloride 0.9%. 1000 milliLiter(s) (100 mL/Hr) IV Continuous <Continuous>  sodium chloride 0.9%. 1000 milliLiter(s) (100 mL/Hr) IV Continuous <Continuous>    MEDICATIONS  (PRN):  HYDROmorphone  Injectable 1 milliGRAM(s) IV Push every 10 minutes PRN Breakthrough pain  magnesium hydroxide Suspension 30 milliLiter(s) Oral daily PRN Constipation  oxyCODONE    5 mG/acetaminophen 325 mG 1 Tablet(s) Oral every 4 hours PRN Moderate Pain                 CBC (12-13 @ 06:00)                              11.6<L>                         10.83<H>  )----------------(  162        65.6  % Neutrophils, 19.8  % Lymphocytes, ANC: 7.12                                36.1<L>  CBC (12-12 @ 13:35)                              11.4<L>                         9.43    )----------------(  163        --    % Neutrophils, --    % Lymphocytes, ANC: --                                  35.8<L>    BMP (12-13 @ 06:00)             140     |  103     |  6<L>  		Ca++ --      Ca 8.3<L>             ---------------------------------( 117<H>		Mg --                 4.3     |  25      |  0.77  			Ph --      BMP (12-12 @ 13:35)             143     |  106     |  9     		Ca++ --      Ca 8.1<L>             ---------------------------------( 95    		Mg --                 3.9     |  25      |  0.71  			Ph --          Coags (12-12 @ 06:00)  aPTT 32.8 / INR 0.99 / PT 11.4      ABG (12-12 @ 10:50)     7.44 / 35 / 97 / 25 / 0.3 / 97.7%     Lactate:    ABG (12-12 @ 09:18)     7.42 / 37 / 167<H> / 24 / -0.5 / 99.0%     Lactate:          Physical Exam:  Gen: Laying in bed, NAD, alert and oriented.   Resp: Unlabored breathing  Abd: soft, NTND  Extremities: WWP. bilateral groin incisions c/d/i. No evidence of hematoma. PT and DP doppler signals present b/l.

## 2017-12-13 NOTE — DISCHARGE NOTE ADULT - CARE PROVIDER_API CALL
Oumou Crocker), Surgery  [address] [facility] [address] [facility]  [address]  [place] [address]  Phone: [phone]  Fax: [phone]

## 2017-12-13 NOTE — PROGRESS NOTE ADULT - SUBJECTIVE AND OBJECTIVE BOX
ANESTHESIA POSTOP CHECK    72y Male POSTOP DAY 1 S/P     [ X] NO APPARENT ANESTHESIA COMPLICATIONS      Comments:

## 2017-12-13 NOTE — DISCHARGE NOTE ADULT - MEDICATION SUMMARY - MEDICATIONS TO TAKE
I will START or STAY ON the medications listed below when I get home from the hospital:    oxyCODONE-acetaminophen 5 mg-325 mg oral tablet  -- 1 tab(s) by mouth every 4 hours, As needed, Moderate to Severe Pain (4-10) MDD:4 tabs  -- Indication: For Pain control    aspirin 81 mg oral delayed release tablet  -- 1 tab(s) by mouth once a day  -- Indication: For Anti-platelet    magnesium hydroxide 8% oral suspension  -- 30 milliliter(s) by mouth once a day, As needed, Constipation  -- Indication: For Constipation    simvastatin 10 mg oral tablet  -- 1 tab(s) by mouth once a day (at bedtime)  -- Indication: For CAD    NIFEdipine 90 mg oral tablet, extended release  -- 1 tab(s) by mouth once a day  -- Indication: For HTN (hypertension)    famotidine 40 mg oral tablet  -- 1 tab(s) by mouth 2 times a day  -- Indication: For GERD    Protonix 40 mg oral delayed release tablet  -- 1 tab(s) by mouth once a day  -- Indication: For GERD

## 2017-12-13 NOTE — DISCHARGE NOTE ADULT - PATIENT PORTAL LINK FT
“You can access the [facility] Patient Portal, offered by [facility], by registering with the following website: [email or web address]”

## 2017-12-14 VITALS
OXYGEN SATURATION: 94 % | RESPIRATION RATE: 18 BRPM | DIASTOLIC BLOOD PRESSURE: 68 MMHG | HEART RATE: 74 BPM | SYSTOLIC BLOOD PRESSURE: 109 MMHG | TEMPERATURE: 99 F

## 2017-12-14 LAB
BUN SERPL-MCNC: 7 MG/DL — SIGNIFICANT CHANGE UP (ref 7–23)
CALCIUM SERPL-MCNC: 8.2 MG/DL — LOW (ref 8.4–10.5)
CHLORIDE SERPL-SCNC: 103 MMOL/L — SIGNIFICANT CHANGE UP (ref 98–107)
CO2 SERPL-SCNC: 24 MMOL/L — SIGNIFICANT CHANGE UP (ref 22–31)
CREAT SERPL-MCNC: 0.83 MG/DL — SIGNIFICANT CHANGE UP (ref 0.5–1.3)
GLUCOSE SERPL-MCNC: 118 MG/DL — HIGH (ref 70–99)
HBA1C BLD-MCNC: 6 % — HIGH (ref 4–5.6)
HCT VFR BLD CALC: 34.1 % — LOW (ref 39–50)
HGB BLD-MCNC: 10.8 G/DL — LOW (ref 13–17)
INR BLD: 1.16 — SIGNIFICANT CHANGE UP (ref 0.88–1.17)
MAGNESIUM SERPL-MCNC: 2.2 MG/DL — SIGNIFICANT CHANGE UP (ref 1.6–2.6)
MCHC RBC-ENTMCNC: 29 PG — SIGNIFICANT CHANGE UP (ref 27–34)
MCHC RBC-ENTMCNC: 31.7 % — LOW (ref 32–36)
MCV RBC AUTO: 91.7 FL — SIGNIFICANT CHANGE UP (ref 80–100)
NRBC # FLD: 0 — SIGNIFICANT CHANGE UP
PHOSPHATE SERPL-MCNC: 2.8 MG/DL — SIGNIFICANT CHANGE UP (ref 2.5–4.5)
PLATELET # BLD AUTO: 150 K/UL — SIGNIFICANT CHANGE UP (ref 150–400)
PMV BLD: 10.7 FL — SIGNIFICANT CHANGE UP (ref 7–13)
POTASSIUM SERPL-MCNC: 4.1 MMOL/L — SIGNIFICANT CHANGE UP (ref 3.5–5.3)
POTASSIUM SERPL-SCNC: 4.1 MMOL/L — SIGNIFICANT CHANGE UP (ref 3.5–5.3)
PROTHROM AB SERPL-ACNC: 13.4 SEC — HIGH (ref 9.8–13.1)
RBC # BLD: 3.72 M/UL — LOW (ref 4.2–5.8)
RBC # FLD: 14.1 % — SIGNIFICANT CHANGE UP (ref 10.3–14.5)
SODIUM SERPL-SCNC: 140 MMOL/L — SIGNIFICANT CHANGE UP (ref 135–145)
WBC # BLD: 12.14 K/UL — HIGH (ref 3.8–10.5)
WBC # FLD AUTO: 12.14 K/UL — HIGH (ref 3.8–10.5)

## 2017-12-14 RX ADMIN — PANTOPRAZOLE SODIUM 40 MILLIGRAM(S): 20 TABLET, DELAYED RELEASE ORAL at 07:14

## 2017-12-14 RX ADMIN — Medication 10 MILLIGRAM(S): at 05:40

## 2017-12-14 RX ADMIN — Medication 100 MILLIGRAM(S): at 05:45

## 2017-12-14 RX ADMIN — FAMOTIDINE 40 MILLIGRAM(S): 10 INJECTION INTRAVENOUS at 05:45

## 2017-12-14 RX ADMIN — HEPARIN SODIUM 5000 UNIT(S): 5000 INJECTION INTRAVENOUS; SUBCUTANEOUS at 05:45

## 2017-12-14 RX ADMIN — Medication 81 MILLIGRAM(S): at 12:46

## 2017-12-14 RX ADMIN — Medication 100 MILLIGRAM(S): at 13:51

## 2017-12-14 RX ADMIN — HEPARIN SODIUM 5000 UNIT(S): 5000 INJECTION INTRAVENOUS; SUBCUTANEOUS at 13:52

## 2017-12-14 RX ADMIN — Medication 90 MILLIGRAM(S): at 05:40

## 2017-12-14 RX ADMIN — OXYCODONE AND ACETAMINOPHEN 1 TABLET(S): 5; 325 TABLET ORAL at 05:40

## 2017-12-14 RX ADMIN — Medication 5 MILLIGRAM(S): at 09:33

## 2017-12-14 RX ADMIN — SODIUM CHLORIDE 75 MILLILITER(S): 9 INJECTION INTRAMUSCULAR; INTRAVENOUS; SUBCUTANEOUS at 09:33

## 2017-12-14 NOTE — PROGRESS NOTE ADULT - ASSESSMENT
71 year old man with HTN, active smoker presenting with an incidentally found 5.3 cm infrarenal AAA and a 3.9 cm R common iliac artery aneurysm and 2.2 cm left common iliac artery aneurysm, patient asymptomatic with no evidence of impending rupture.     - f/u CT Angio chest / abdomen / pelvis   - VA duplex of bilateral LE to evaluate for popliteal aneurysm  - Plan for EVAR   - Maintain BP < 150 / 90  - VTE ppx  - Reg diet        C-team surgery: [id number]
71 year old man with HTN, active smoker presenting with an incidentally found 5.3 cm infrarenal AAA and a 3.9 cm R common iliac artery aneurysm and 2.2 cm left common iliac artery aneurysm, patient asymptomatic with no evidence of impending rupture.     - 12/8 CT Angio chest / abdomen / pelvis - AAA, b/l common iliac aneurysms   - 12/8 VA duplex of bilateral LE - R popliteal aneurysm  - Plan for EVAR 12/12  - Maintain BP < 150 / 90  - VTE ppx  - Reg diet  - f/u cardiology assessment recs         C-team surgery: [id number]
Assessment:  71 year old man with HTN, active smoker presenting with an incidentally found 5.3 cm infrarenal AAA and a 3.9 cm R common iliac artery aneurysm and 2.2 cm left common iliac artery aneurysm, patient asymptomatic with no evidence of impending rupture s/p EVAR with right hypogastric embolization on 12/12    Plan:  - Regular diet  - F/u results of CXR and ABX  - call placed to outpatient gastroenterologist  - VTE PPX  - Dispo: patient ok to go home today if tolerating diet      Gil Ordoñez, PGY-1  C Team Surgery [id number]
Assessment:  71 year old man with HTN, active smoker presenting with an incidentally found 5.3 cm infrarenal AAA and a 3.9 cm R common iliac artery aneurysm and 2.2 cm left common iliac artery aneurysm, patient asymptomatic with no evidence of impending rupture s/p EVAR with right hypogastric embolization on 12/12    Plan:  -Regular diet  -F/u results of CXR and ABX  -call placed to outpatient gastroenterologist (awaiting call back)  -Mendez d/c  -VTE PPX      Darcie Smallwood PA-C  C Team Surgery [id number]
Assessment:  71 year old man with HTN, active smoker presenting with an incidentally found 5.3 cm infrarenal AAA and a 3.9 cm R common iliac artery aneurysm and 2.2 cm left common iliac artery aneurysm, patient asymptomatic with no evidence of impending rupture.     Plan:  - 12/8 CT Angio chest / abdomen / pelvis - AAA, b/l common iliac aneurysms   - 12/8 VA duplex of bilateral LE - R popliteal aneurysm  - Plan for EVAR 12/12  - Maintain BP < 150 / 90  - VTE ppx  - Reg diet  - CTA with runoff today to better assess popliteal aneurysms  - Nuclear stress test today per cardiology recs      Gil Ordoñez, PGY-1  C Team Surgery [id number]
Assessment:  71 year old man with HTN, active smoker presenting with an incidentally found 5.3 cm infrarenal AAA and a 3.9 cm R common iliac artery aneurysm and 2.2 cm left common iliac artery aneurysm, patient asymptomatic with no evidence of impending rupture.     Plan:  - 12/8 CT Angio chest / abdomen / pelvis - AAA, b/l common iliac aneurysms   - 12/8 VA duplex of bilateral LE - R popliteal aneurysm  - Plan for EVAR 12/12  - Maintain BP < 150 / 90  - VTE ppx  - Reg diet  - f/u cardiology assessment recs       Gil Ordoñez, PGY-1  C Team Surgery [id number]
Assessment:  71 year old man with HTN, active smoker presenting with an incidentally found 5.3 cm infrarenal AAA and a 3.9 cm R common iliac artery aneurysm and 2.2 cm left common iliac artery aneurysm, patient asymptomatic with no evidence of impending rupture.     Plan:  - 12/8 CT Angio chest / abdomen / pelvis - AAA, b/l common iliac aneurysms   - 12/8 VA duplex of bilateral LE - R popliteal aneurysm  - Plan for EVAR today  - Pre-op'd and consented  - Maintain BP < 150 / 90  - VTE ppx  - Reg diet      Gil Ordoñez, PGY-1  C Team Surgery [id number]
71M with HTN, active smoker presenting with an incidentally found 5.3 cm infrarenal AAA and a 3.9 cm R common iliac artery aneurysm and 2.2 cm left common iliac artery aneurysm, patient asymptomatic with no evidence of impending rupture.     Plan:  - 12/8 CT Angio chest / abdomen / pelvis - AAA, b/l common iliac aneurysms   - 12/8 VA duplex of bilateral LE - R popliteal aneurysm  - Plan for EVAR 12/12  - Maintain BP < 150 / 90  - VTE ppx  - Reg diet  - CTA with runoff today to better assess popliteal aneurysms  - Nuclear stress test today per cardiology recs - normal   - booked and consented  - NPO pmn  - pre op labs ordered     C TEAM [id number]

## 2017-12-14 NOTE — PROGRESS NOTE ADULT - PROVIDER SPECIALTY LIST ADULT
Anesthesia
Cardiology
Vascular Surgery

## 2017-12-14 NOTE — PROGRESS NOTE ADULT - SUBJECTIVE AND OBJECTIVE BOX
Surgery Progress Note    S: Patient seen and examined. No acute events overnight. Pain well controlled with current regimen. Patient reports passing some gas but not having a bowel movement for the last 2 days. He denies additional vomiting since yesterday early evening.    O:  Vital Signs Last 24 Hrs  T(C): 36.4 (14 Dec 2017 09:46), Max: 38 (13 Dec 2017 18:07)  T(F): 97.5 (14 Dec 2017 09:46), Max: 100.4 (13 Dec 2017 18:07)  HR: 70 (14 Dec 2017 09:46) (62 - 78)  BP: 114/77 (14 Dec 2017 09:46) (100/55 - 131/63)  BP(mean): --  RR: 18 (14 Dec 2017 09:46) (14 - 20)  SpO2: 99% (14 Dec 2017 09:46) (94% - 99%)    I&O's Detail    13 Dec 2017 07:01  -  14 Dec 2017 07:00  --------------------------------------------------------  IN:    Oral Fluid: 600 mL    sodium chloride 0.9%.: 1700 mL  Total IN: 2300 mL    OUT:    Indwelling Catheter - Urethral: 400 mL    Voided: 4225 mL  Total OUT: 4625 mL    Total NET: -2325 mL      14 Dec 2017 07:01  -  14 Dec 2017 14:11  --------------------------------------------------------  IN:    sodium chloride 0.9%.: 300 mL  Total IN: 300 mL    OUT:    Voided: 975 mL  Total OUT: 975 mL    Total NET: -675 mL          MEDICATIONS  (STANDING):  aspirin enteric coated 81 milliGRAM(s) Oral daily  docusate sodium 100 milliGRAM(s) Oral three times a day  famotidine    Tablet 40 milliGRAM(s) Oral two times a day  heparin  Injectable 5000 Unit(s) SubCutaneous every 8 hours  metoclopramide 10 milliGRAM(s) Oral two times a day  NIFEdipine XL 90 milliGRAM(s) Oral daily  pantoprazole    Tablet 40 milliGRAM(s) Oral before breakfast  simvastatin 10 milliGRAM(s) Oral at bedtime  sodium chloride 0.9%. 1000 milliLiter(s) (75 mL/Hr) IV Continuous <Continuous>    MEDICATIONS  (PRN):  bisacodyl 5 milliGRAM(s) Oral every 12 hours PRN Constipation  magnesium hydroxide Suspension 30 milliLiter(s) Oral daily PRN Constipation  oxyCODONE    5 mG/acetaminophen 325 mG 1 Tablet(s) Oral every 4 hours PRN Moderate to Severe Pain (4-10)                            10.8   12.14 )-----------( 150      ( 14 Dec 2017 05:35 )             34.1       12-14    140  |  103  |  7   ----------------------------<  118<H>  4.1   |  24  |  0.83    Ca    8.2<L>      14 Dec 2017 05:35  Phos  2.8     12-14  Mg     2.2     12-14        Physical Exam:  Gen: Laying in bed, NAD, alert and oriented.   Resp: Unlabored breathing  Abd: soft, NTND  Extremities: WWP. bilateral groin incisions c/d/i. No evidence of hematoma. PT and DP doppler signals present b/l.

## 2018-01-05 PROBLEM — I10 ESSENTIAL (PRIMARY) HYPERTENSION: Chronic | Status: ACTIVE | Noted: 2017-12-07

## 2018-01-05 PROBLEM — Z00.00 ENCOUNTER FOR PREVENTIVE HEALTH EXAMINATION: Status: ACTIVE | Noted: 2018-01-05

## 2018-01-08 ENCOUNTER — OUTPATIENT (OUTPATIENT)
Dept: OUTPATIENT SERVICES | Facility: HOSPITAL | Age: 73
LOS: 1 days | End: 2018-01-08
Payer: MEDICARE

## 2018-01-08 ENCOUNTER — APPOINTMENT (OUTPATIENT)
Dept: ULTRASOUND IMAGING | Facility: IMAGING CENTER | Age: 73
End: 2018-01-08
Payer: MEDICARE

## 2018-01-08 DIAGNOSIS — Z00.8 ENCOUNTER FOR OTHER GENERAL EXAMINATION: ICD-10-CM

## 2018-01-08 PROCEDURE — 76700 US EXAM ABDOM COMPLETE: CPT

## 2018-01-08 PROCEDURE — 76700 US EXAM ABDOM COMPLETE: CPT | Mod: 26

## 2018-01-22 ENCOUNTER — OUTPATIENT (OUTPATIENT)
Dept: OUTPATIENT SERVICES | Facility: HOSPITAL | Age: 73
LOS: 1 days | Discharge: HOME | End: 2018-01-22
Payer: MEDICARE

## 2018-01-22 ENCOUNTER — RESULT REVIEW (OUTPATIENT)
Age: 73
End: 2018-01-22

## 2018-01-22 DIAGNOSIS — R10.826 EPIGASTRIC REBOUND ABDOMINAL TENDERNESS: ICD-10-CM

## 2018-01-22 PROCEDURE — 88312 SPECIAL STAINS GROUP 1: CPT | Mod: 26

## 2018-01-22 PROCEDURE — 88305 TISSUE EXAM BY PATHOLOGIST: CPT | Mod: 26

## 2018-01-24 LAB — SURGICAL PATHOLOGY STUDY: SIGNIFICANT CHANGE UP

## 2018-02-06 ENCOUNTER — APPOINTMENT (OUTPATIENT)
Dept: UROLOGY | Facility: CLINIC | Age: 73
End: 2018-02-06
Payer: MEDICARE

## 2018-02-06 VITALS
HEIGHT: 67 IN | RESPIRATION RATE: 16 BRPM | HEART RATE: 64 BPM | WEIGHT: 230 LBS | SYSTOLIC BLOOD PRESSURE: 143 MMHG | BODY MASS INDEX: 36.1 KG/M2 | TEMPERATURE: 98.4 F | DIASTOLIC BLOOD PRESSURE: 89 MMHG

## 2018-02-06 DIAGNOSIS — N28.1 CYST OF KIDNEY, ACQUIRED: ICD-10-CM

## 2018-02-06 DIAGNOSIS — Z86.79 PERSONAL HISTORY OF OTHER DISEASES OF THE CIRCULATORY SYSTEM: ICD-10-CM

## 2018-02-06 DIAGNOSIS — Z78.9 OTHER SPECIFIED HEALTH STATUS: ICD-10-CM

## 2018-02-06 PROCEDURE — 99203 OFFICE O/P NEW LOW 30 MIN: CPT

## 2018-02-06 RX ORDER — SIMVASTATIN 80 MG/1
TABLET, FILM COATED ORAL
Refills: 0 | Status: ACTIVE | COMMUNITY

## 2018-03-15 ENCOUNTER — OUTPATIENT (OUTPATIENT)
Dept: OUTPATIENT SERVICES | Facility: HOSPITAL | Age: 73
LOS: 1 days | End: 2018-03-15

## 2018-03-15 ENCOUNTER — APPOINTMENT (OUTPATIENT)
Dept: RADIOLOGY | Facility: HOSPITAL | Age: 73
End: 2018-03-15
Payer: MEDICARE

## 2018-03-15 DIAGNOSIS — K64.8 OTHER HEMORRHOIDS: ICD-10-CM

## 2018-03-15 PROCEDURE — 74270 X-RAY XM COLON 1CNTRST STD: CPT | Mod: 26

## 2018-06-15 ENCOUNTER — OUTPATIENT (OUTPATIENT)
Dept: OUTPATIENT SERVICES | Facility: HOSPITAL | Age: 73
LOS: 1 days | End: 2018-06-15
Payer: MEDICARE

## 2018-06-15 ENCOUNTER — APPOINTMENT (OUTPATIENT)
Dept: CT IMAGING | Facility: IMAGING CENTER | Age: 73
End: 2018-06-15
Payer: MEDICARE

## 2018-06-15 DIAGNOSIS — Z00.8 ENCOUNTER FOR OTHER GENERAL EXAMINATION: ICD-10-CM

## 2018-06-15 DIAGNOSIS — K64.8 OTHER HEMORRHOIDS: ICD-10-CM

## 2018-06-15 PROCEDURE — 74177 CT ABD & PELVIS W/CONTRAST: CPT

## 2018-06-15 PROCEDURE — 82565 ASSAY OF CREATININE: CPT

## 2018-06-15 PROCEDURE — 74177 CT ABD & PELVIS W/CONTRAST: CPT | Mod: 26

## 2018-06-21 ENCOUNTER — EMERGENCY (EMERGENCY)
Facility: HOSPITAL | Age: 73
LOS: 1 days | Discharge: HOME | End: 2018-06-21
Attending: EMERGENCY MEDICINE | Admitting: EMERGENCY MEDICINE
Payer: MEDICARE

## 2018-06-21 VITALS
RESPIRATION RATE: 17 BRPM | OXYGEN SATURATION: 99 % | SYSTOLIC BLOOD PRESSURE: 146 MMHG | HEART RATE: 70 BPM | DIASTOLIC BLOOD PRESSURE: 72 MMHG

## 2018-06-21 VITALS
HEART RATE: 75 BPM | SYSTOLIC BLOOD PRESSURE: 128 MMHG | RESPIRATION RATE: 18 BRPM | DIASTOLIC BLOOD PRESSURE: 102 MMHG | TEMPERATURE: 98 F

## 2018-06-21 LAB
ALBUMIN SERPL ELPH-MCNC: 4.6 G/DL — SIGNIFICANT CHANGE UP (ref 3.3–5)
ALP SERPL-CCNC: 112 U/L — SIGNIFICANT CHANGE UP (ref 40–120)
ALT FLD-CCNC: 22 U/L — SIGNIFICANT CHANGE UP (ref 4–41)
APTT BLD: 31.7 SEC — SIGNIFICANT CHANGE UP (ref 27.5–37.4)
AST SERPL-CCNC: 28 U/L — SIGNIFICANT CHANGE UP (ref 4–40)
BASOPHILS # BLD AUTO: 0.05 K/UL — SIGNIFICANT CHANGE UP (ref 0–0.2)
BASOPHILS NFR BLD AUTO: 0.5 % — SIGNIFICANT CHANGE UP (ref 0–2)
BILIRUB SERPL-MCNC: 0.5 MG/DL — SIGNIFICANT CHANGE UP (ref 0.2–1.2)
BLD GP AB SCN SERPL QL: NEGATIVE — SIGNIFICANT CHANGE UP
BUN SERPL-MCNC: 11 MG/DL — SIGNIFICANT CHANGE UP (ref 7–23)
CALCIUM SERPL-MCNC: 9.3 MG/DL — SIGNIFICANT CHANGE UP (ref 8.4–10.5)
CHLORIDE SERPL-SCNC: 99 MMOL/L — SIGNIFICANT CHANGE UP (ref 98–107)
CO2 SERPL-SCNC: 27 MMOL/L — SIGNIFICANT CHANGE UP (ref 22–31)
CREAT SERPL-MCNC: 1.01 MG/DL — SIGNIFICANT CHANGE UP (ref 0.5–1.3)
EOSINOPHIL # BLD AUTO: 0.29 K/UL — SIGNIFICANT CHANGE UP (ref 0–0.5)
EOSINOPHIL NFR BLD AUTO: 3.1 % — SIGNIFICANT CHANGE UP (ref 0–6)
GLUCOSE SERPL-MCNC: 100 MG/DL — HIGH (ref 70–99)
HCT VFR BLD CALC: 40.7 % — SIGNIFICANT CHANGE UP (ref 39–50)
HGB BLD-MCNC: 13.5 G/DL — SIGNIFICANT CHANGE UP (ref 13–17)
IMM GRANULOCYTES # BLD AUTO: 0.02 # — SIGNIFICANT CHANGE UP
IMM GRANULOCYTES NFR BLD AUTO: 0.2 % — SIGNIFICANT CHANGE UP (ref 0–1.5)
INR BLD: 1.04 — SIGNIFICANT CHANGE UP (ref 0.88–1.17)
LYMPHOCYTES # BLD AUTO: 3.26 K/UL — SIGNIFICANT CHANGE UP (ref 1–3.3)
LYMPHOCYTES # BLD AUTO: 35.2 % — SIGNIFICANT CHANGE UP (ref 13–44)
MCHC RBC-ENTMCNC: 28.2 PG — SIGNIFICANT CHANGE UP (ref 27–34)
MCHC RBC-ENTMCNC: 33.2 % — SIGNIFICANT CHANGE UP (ref 32–36)
MCV RBC AUTO: 85 FL — SIGNIFICANT CHANGE UP (ref 80–100)
MONOCYTES # BLD AUTO: 0.92 K/UL — HIGH (ref 0–0.9)
MONOCYTES NFR BLD AUTO: 9.9 % — SIGNIFICANT CHANGE UP (ref 2–14)
NEUTROPHILS # BLD AUTO: 4.71 K/UL — SIGNIFICANT CHANGE UP (ref 1.8–7.4)
NEUTROPHILS NFR BLD AUTO: 51.1 % — SIGNIFICANT CHANGE UP (ref 43–77)
NRBC # FLD: 0 — SIGNIFICANT CHANGE UP
PLATELET # BLD AUTO: 168 K/UL — SIGNIFICANT CHANGE UP (ref 150–400)
PMV BLD: 10.2 FL — SIGNIFICANT CHANGE UP (ref 7–13)
POTASSIUM SERPL-MCNC: 4.2 MMOL/L — SIGNIFICANT CHANGE UP (ref 3.5–5.3)
POTASSIUM SERPL-SCNC: 4.2 MMOL/L — SIGNIFICANT CHANGE UP (ref 3.5–5.3)
PROT SERPL-MCNC: 8.4 G/DL — HIGH (ref 6–8.3)
PROTHROM AB SERPL-ACNC: 12 SEC — SIGNIFICANT CHANGE UP (ref 9.8–13.1)
RBC # BLD: 4.79 M/UL — SIGNIFICANT CHANGE UP (ref 4.2–5.8)
RBC # FLD: 14 % — SIGNIFICANT CHANGE UP (ref 10.3–14.5)
RH IG SCN BLD-IMP: POSITIVE — SIGNIFICANT CHANGE UP
SODIUM SERPL-SCNC: 138 MMOL/L — SIGNIFICANT CHANGE UP (ref 135–145)
WBC # BLD: 9.25 K/UL — SIGNIFICANT CHANGE UP (ref 3.8–10.5)
WBC # FLD AUTO: 9.25 K/UL — SIGNIFICANT CHANGE UP (ref 3.8–10.5)

## 2018-06-21 PROCEDURE — 99285 EMERGENCY DEPT VISIT HI MDM: CPT | Mod: GC

## 2018-06-21 PROCEDURE — 74174 CTA ABD&PLVS W/CONTRAST: CPT | Mod: 26

## 2018-06-21 NOTE — ED PROVIDER NOTE - OBJECTIVE STATEMENT
Pt is a 72 Y M Pt is a 72 Y M with hx of AAA s/p repair in 2017 as well as HTN. He was sent by his PCP after outpatient screening CT of graft that showed possible jazmine-graft leak. Pt states that he is feeling fine. He is still smoking and isn't interested in quitting. He has no abdominal pain, no LOC, no weakness, no nausea, no vomiting.

## 2018-06-21 NOTE — ED PROVIDER NOTE - PROGRESS NOTE DETAILS
David PGY4: Pt feeling well, wants to go home. Per vascular, CTA likely performed without delayed phase, unlikely endoleak given that and lack of sx. This was explained to pt by surgery c/s. Will f/u with Dr. Lizzette keys in 3 weeks. Pt given copy of results and instructed PMD f/u 1-2 days, vascular f/u 3 weeks, RTED abd pain, back pain, GI bleed, or any other concerning sx. Ready for d/c.

## 2018-06-21 NOTE — ED ADULT NURSE REASSESSMENT NOTE - NS ED NURSE REASSESS COMMENT FT1
I did the SBIRT screen on the pt. Pt states he smokes 1/2 ppd and is not interested in receiving information regarding cessation, he stated he can quit if he wants to. 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     Helen Diaz RN
Pt reevaluated by md, discharged to home by same.

## 2018-06-21 NOTE — CONSULT NOTE ADULT - SUBJECTIVE AND OBJECTIVE BOX
Vascular Surgery Consultation     HPI:  This  is a 72 Y M with hx of AAA s/p repair in 2017 as well as HTN. He was sent by his PCP after outpatient screening CT of graft that showed possible jazmine-graft leak. Pt states that he is feeling fine. He has intermittent vague abdominal pain. He denies LE pain, no back pain. He never followed up after his surgery with Dr. Crocker.  He is still smoking and isn't interested in quitting. He has no abdominal pain, no LOC, no weakness, no nausea, no vomiting.      PAST MEDICAL & SURGICAL HISTORY:  HTN (hypertension)  No significant past surgical history      FAMILY HISTORY: not contributory       SOCIAL HISTORY: active smoker     MEDICATIONS  (STANDING):    MEDICATIONS  (PRN):    Allergies    No Known Allergies    Intolerances        Vital Signs Last 24 Hrs  T(C): 36.7 (21 Jun 2018 13:10), Max: 36.7 (21 Jun 2018 13:10)  T(F): 98 (21 Jun 2018 13:10), Max: 98 (21 Jun 2018 13:10)  HR: 63 (21 Jun 2018 14:13) (63 - 75)  BP: 125/73 (21 Jun 2018 14:13) (125/73 - 128/102)  BP(mean): --  RR: 13 (21 Jun 2018 14:13) (13 - 18)  SpO2: 95% (21 Jun 2018 14:13) (95% - 95%)      Gen: NAD  Resp: CTA b/l   CV: RRR  Abd: soft, obese, NTTP, no pulsatile abdominal mass   Ext: palp DP/PT/fem b/l, ext warm and well perfused                               13.5   9.25  )-----------( 168      ( 21 Jun 2018 14:00 )             40.7     06-21    138  |  99  |  11  ----------------------------<  100<H>  4.2   |  27  |  1.01    Ca    9.3      21 Jun 2018 14:00    TPro  8.4<H>  /  Alb  4.6  /  TBili  0.5  /  DBili  x   /  AST  28  /  ALT  22  /  AlkPhos  112  06-21    PT/INR - ( 21 Jun 2018 14:00 )   PT: 12.0 SEC;   INR: 1.04          PTT - ( 21 Jun 2018 14:00 )  PTT:31.7 SEC      Radiographic Findings:     FINDINGS:    LOWER CHEST: Bibasilar groundglass and peripheral reticulation may be   related to underlying chronic interstitial lung disease. The partially   imaged heart is mildly enlarged with evidence of aortic valve   calcification.    LIVER: Cirrhotic liver morphology. Hypertrophy of the left hepatic lobe.  BILE DUCTS: Normal caliber.  GALLBLADDER: Within normal limits.  SPLEEN: Within normal limits.  PANCREAS: 1.9 cm cystic lesion along the distal body of the pancreas,   unchanged. No pancreatic ductal dilatation.  ADRENALS: Within normal limits.  KIDNEYS/URETERS: No hydronephrosis. Stable 2.3 cm mildly hyperdense   appearing left renal cyst, previously characterized as a simple cyst on   ultrasound from 1/8/2018.    BLADDER: Within normal limits.  REPRODUCTIVE ORGANS: Prostate gland is within normal limits.    BOWEL: No bowel traction. Colonic diverticulosis without diverticulitis.   Normal appendix.  PERITONEUM: No ascites.  VESSELS:  Status post aortobiiliac stent exclusion of abdominal aortic   aneurysm. Small ill-defined high density focus within the aneurysmal sac,   series 2 image 63, suggestive of endoleak.    The aneurysm sac measures 4.0 x 3.9 cm, previously 5.5 x 5.2 cm prior to   stent placement. Both iliac lumens are patent. The right common iliac   artery measures 1.8 cm, previously 3.3 cm on preoperative imaging. The   left common iliac artery measures 1.9 cm, previously 3.3 cm on   preoperative imaging.     RETROPERITONEUM: Few mildly prominent upper abdominal periportal and   peripancreatic nodes are without significant change.  ABDOMINAL WALL: Small bilateral fat-containing inguinal hernias.  BONES: Multilevel degenerative changes. L5 vertebral body hemangioma.   Mild anterior wedging of T12 vertebral body is unchanged.    IMPRESSION:    Interval aortobiiliac stent exclusion of abdominal aortic aneurysm.   Decreased size of the aneurysm sac as compared to preoperative imaging.   Ill-defined high density contrast focus within the aneurysmal sac is   concerning for endoleak. CTA or time resolved MRA is recommended to   further evaluate this finding.    Liver cirrhosis.      Assessment:   This is a 72 year old male, s/p EVAR in December 2017, asymptomatic, has CT (not CT angiography) scan with concern for endoleak.     - Please obtain CTA with delayed imaging, discussed with radiology, to rule out endoleak  - Discussed with Dr. Crocker     [id number]

## 2018-06-21 NOTE — ED ADULT TRIAGE NOTE - CHIEF COMPLAINT QUOTE
Pt s/p aortic   repair 2017 which he had  a follow up ct scan this week and was called at home told to come to ER because they saw that is was leaking per daughter. Pt denies any pain at this time. Pt sent in by Dr. lópez .

## 2018-06-21 NOTE — ED ADULT NURSE NOTE - OBJECTIVE STATEMENT
Patient presented to ED A&O x4, sent by PMD for (+) leakage around aorta on CT scan. PMH: AAA repair in 2017.  Denies any SOB or CP. EKG done and cardiac monitor in place.  Blood work drawn and sent to lab. ER physician evaluated patient.  Will continue to monitor patient closely. Vance VALADEZ.

## 2018-06-21 NOTE — ED PROVIDER NOTE - MEDICAL DECISION MAKING DETAILS
Skip: pt got ct scan for lymph nodes. discovered ?aortic leak from old repair. No chest pain, no sob, +hiccups for months.  Will get vascular/thoracic surgery to decide what steps to take about CT findings. currently asymptomatic.

## 2018-07-16 ENCOUNTER — APPOINTMENT (OUTPATIENT)
Dept: VASCULAR SURGERY | Facility: CLINIC | Age: 73
End: 2018-07-16
Payer: MEDICARE

## 2018-07-16 ENCOUNTER — APPOINTMENT (OUTPATIENT)
Dept: VASCULAR SURGERY | Facility: CLINIC | Age: 73
End: 2018-07-16

## 2018-07-16 VITALS
TEMPERATURE: 98.5 F | WEIGHT: 235 LBS | SYSTOLIC BLOOD PRESSURE: 123 MMHG | HEART RATE: 70 BPM | DIASTOLIC BLOOD PRESSURE: 77 MMHG | BODY MASS INDEX: 36.88 KG/M2 | HEIGHT: 67 IN

## 2018-07-16 PROCEDURE — 93926 LOWER EXTREMITY STUDY: CPT

## 2018-07-16 PROCEDURE — 93922 UPR/L XTREMITY ART 2 LEVELS: CPT

## 2018-07-16 PROCEDURE — 99214 OFFICE O/P EST MOD 30 MIN: CPT

## 2018-07-28 PROBLEM — Z78.9 ALCOHOL USE: Status: INACTIVE | Noted: 2018-02-06

## 2018-08-28 ENCOUNTER — OUTPATIENT (OUTPATIENT)
Dept: OUTPATIENT SERVICES | Facility: HOSPITAL | Age: 73
LOS: 1 days | Discharge: HOME | End: 2018-08-28
Payer: MEDICARE

## 2018-08-28 DIAGNOSIS — T82.330A LEAKAGE OF AORTIC (BIFURCATION) GRAFT (REPLACEMENT), INITIAL ENCOUNTER: Chronic | ICD-10-CM

## 2018-08-28 DIAGNOSIS — I73.9 PERIPHERAL VASCULAR DISEASE, UNSPECIFIED: ICD-10-CM

## 2018-08-28 LAB
BUN SERPL-MCNC: 12 MG/DL — SIGNIFICANT CHANGE UP (ref 7–23)
CALCIUM SERPL-MCNC: 9.4 MG/DL — SIGNIFICANT CHANGE UP (ref 8.4–10.5)
CHLORIDE SERPL-SCNC: 100 MMOL/L — SIGNIFICANT CHANGE UP (ref 98–107)
CO2 SERPL-SCNC: 27 MMOL/L — SIGNIFICANT CHANGE UP (ref 22–31)
CREAT SERPL-MCNC: 1.16 MG/DL — SIGNIFICANT CHANGE UP (ref 0.5–1.3)
GLUCOSE SERPL-MCNC: 106 MG/DL — HIGH (ref 70–99)
HCT VFR BLD CALC: 42.1 % — SIGNIFICANT CHANGE UP (ref 39–50)
HGB BLD-MCNC: 13.8 G/DL — SIGNIFICANT CHANGE UP (ref 13–17)
MCHC RBC-ENTMCNC: 28.5 PG — SIGNIFICANT CHANGE UP (ref 27–34)
MCHC RBC-ENTMCNC: 32.8 % — SIGNIFICANT CHANGE UP (ref 32–36)
MCV RBC AUTO: 86.8 FL — SIGNIFICANT CHANGE UP (ref 80–100)
NRBC # FLD: 0 — SIGNIFICANT CHANGE UP
PLATELET # BLD AUTO: 152 K/UL — SIGNIFICANT CHANGE UP (ref 150–400)
PMV BLD: 9.7 FL — SIGNIFICANT CHANGE UP (ref 7–13)
POTASSIUM SERPL-MCNC: 4.4 MMOL/L — SIGNIFICANT CHANGE UP (ref 3.5–5.3)
POTASSIUM SERPL-SCNC: 4.4 MMOL/L — SIGNIFICANT CHANGE UP (ref 3.5–5.3)
RBC # BLD: 4.85 M/UL — SIGNIFICANT CHANGE UP (ref 4.2–5.8)
RBC # FLD: 13.7 % — SIGNIFICANT CHANGE UP (ref 10.3–14.5)
SODIUM SERPL-SCNC: 139 MMOL/L — SIGNIFICANT CHANGE UP (ref 135–145)
WBC # BLD: 7.18 K/UL — SIGNIFICANT CHANGE UP (ref 3.8–10.5)
WBC # FLD AUTO: 7.18 K/UL — SIGNIFICANT CHANGE UP (ref 3.8–10.5)

## 2018-08-28 PROCEDURE — 75710 ARTERY X-RAYS ARM/LEG: CPT | Mod: 26

## 2018-08-28 PROCEDURE — 36245 INS CATH ABD/L-EXT ART 1ST: CPT

## 2018-08-28 PROCEDURE — 76937 US GUIDE VASCULAR ACCESS: CPT | Mod: 26

## 2018-08-28 RX ORDER — ASPIRIN/CALCIUM CARB/MAGNESIUM 324 MG
81 TABLET ORAL ONCE
Qty: 0 | Refills: 0 | Status: COMPLETED | OUTPATIENT
Start: 2018-08-28 | End: 2018-08-28

## 2018-08-28 RX ORDER — SODIUM CHLORIDE 9 MG/ML
3 INJECTION INTRAMUSCULAR; INTRAVENOUS; SUBCUTANEOUS EVERY 8 HOURS
Qty: 0 | Refills: 0 | Status: DISCONTINUED | OUTPATIENT
Start: 2018-08-28 | End: 2018-09-12

## 2018-08-28 RX ADMIN — Medication 81 MILLIGRAM(S): at 12:34

## 2018-08-28 NOTE — H&P CARDIOLOGY - NEGATIVE CARDIOVASCULAR SYMPTOMS
no peripheral edema/no palpitations/no orthopnea/no chest pain/no dyspnea on exertion/no paroxysmal nocturnal dyspnea

## 2018-08-28 NOTE — H&P CARDIOLOGY - RS GEN PE MLT RESP DETAILS PC
good air movement/breath sounds equal/clear to auscultation bilaterally/respirations non-labored/airway patent/no chest wall tenderness

## 2018-08-28 NOTE — H&P CARDIOLOGY - HISTORY OF PRESENT ILLNESS
71 y/o M w/ PMH of HTN, GERD, PVD, Smoking and AAA s/p EVAR(2017) presents for right lower extremity angiogram. 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 Angiogram is to evaluate his anatomy prior to popliteal aneurysm repair. 73 y/o M w/ PMH of HTN, GERD, PVD, Smoking and AAA s/p EVAR(2017) presents for right lower extremity angiogram. 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 Pt states that he experiences bilateral lower extremity claudication for about 6 months. Angiogram is to evaluate his anatomy prior to popliteal aneurysm repair.

## 2018-08-28 NOTE — H&P CARDIOLOGY - PMH
AAA (abdominal aortic aneurysm)    GERD (gastroesophageal reflux disease)    HTN (hypertension)    PVD (peripheral vascular disease)    Smoker

## 2018-08-31 ENCOUNTER — APPOINTMENT (OUTPATIENT)
Dept: VASCULAR SURGERY | Facility: CLINIC | Age: 73
End: 2018-08-31
Payer: MEDICARE

## 2018-08-31 ENCOUNTER — OUTPATIENT (OUTPATIENT)
Dept: OUTPATIENT SERVICES | Facility: HOSPITAL | Age: 73
LOS: 1 days | End: 2018-08-31
Payer: MEDICARE

## 2018-08-31 VITALS
HEIGHT: 66.5 IN | OXYGEN SATURATION: 95 % | TEMPERATURE: 99 F | SYSTOLIC BLOOD PRESSURE: 120 MMHG | WEIGHT: 231.93 LBS | HEART RATE: 71 BPM | RESPIRATION RATE: 16 BRPM | DIASTOLIC BLOOD PRESSURE: 70 MMHG

## 2018-08-31 VITALS
HEART RATE: 72 BPM | DIASTOLIC BLOOD PRESSURE: 70 MMHG | TEMPERATURE: 98 F | WEIGHT: 235 LBS | SYSTOLIC BLOOD PRESSURE: 138 MMHG | BODY MASS INDEX: 36.88 KG/M2 | HEIGHT: 67 IN

## 2018-08-31 DIAGNOSIS — Z98.49 CATARACT EXTRACTION STATUS, UNSPECIFIED EYE: Chronic | ICD-10-CM

## 2018-08-31 DIAGNOSIS — I73.9 PERIPHERAL VASCULAR DISEASE, UNSPECIFIED: ICD-10-CM

## 2018-08-31 DIAGNOSIS — I10 ESSENTIAL (PRIMARY) HYPERTENSION: ICD-10-CM

## 2018-08-31 DIAGNOSIS — I96 GANGRENE, NOT ELSEWHERE CLASSIFIED: ICD-10-CM

## 2018-08-31 DIAGNOSIS — T82.330A LEAKAGE OF AORTIC (BIFURCATION) GRAFT (REPLACEMENT), INITIAL ENCOUNTER: Chronic | ICD-10-CM

## 2018-08-31 DIAGNOSIS — Z96.7 PRESENCE OF OTHER BONE AND TENDON IMPLANTS: Chronic | ICD-10-CM

## 2018-08-31 PROBLEM — F17.200 NICOTINE DEPENDENCE, UNSPECIFIED, UNCOMPLICATED: Chronic | Status: ACTIVE | Noted: 2018-08-28

## 2018-08-31 PROBLEM — K21.9 GASTRO-ESOPHAGEAL REFLUX DISEASE WITHOUT ESOPHAGITIS: Chronic | Status: ACTIVE | Noted: 2018-08-28

## 2018-08-31 LAB
BLD GP AB SCN SERPL QL: NEGATIVE — SIGNIFICANT CHANGE UP
RH IG SCN BLD-IMP: POSITIVE — SIGNIFICANT CHANGE UP

## 2018-08-31 PROCEDURE — 93970 EXTREMITY STUDY: CPT

## 2018-08-31 PROCEDURE — 93010 ELECTROCARDIOGRAM REPORT: CPT

## 2018-08-31 PROCEDURE — 99213 OFFICE O/P EST LOW 20 MIN: CPT

## 2018-08-31 RX ORDER — FAMOTIDINE 10 MG/ML
1 INJECTION INTRAVENOUS
Qty: 0 | Refills: 0 | COMMUNITY

## 2018-08-31 RX ORDER — PANTOPRAZOLE SODIUM 20 MG/1
1 TABLET, DELAYED RELEASE ORAL
Qty: 0 | Refills: 0 | COMMUNITY

## 2018-08-31 RX ORDER — DILTIAZEM HCL 120 MG
1 CAPSULE, EXT RELEASE 24 HR ORAL
Qty: 0 | Refills: 0 | COMMUNITY

## 2018-08-31 NOTE — H&P PST ADULT - NEGATIVE CARDIOVASCULAR SYMPTOMS
no peripheral edema/no chest pain/no dyspnea on exertion/no paroxysmal nocturnal dyspnea/no palpitations

## 2018-08-31 NOTE — H&P PST ADULT - ACTIVITY
Charanjit Hillary   MRN: [id number]    Department:  [facility] Emergency Department   Date of Visit:  9/23/2019           Disclosure     Insurance plans vary and the physician(s) referred by the ER may not be covered by your plan.  Please contac within the next three months to obtain basic health screening including reassessment of your blood pressure.     IF THERE IS ANY CHANGE OR WORSENING OF YOUR CONDITION, CALL YOUR PRIMARY CARE PHYSICIAN AT ONCE OR RETURN IMMEDIATELY TO THE EMERGENCY DEPARTMEN able to climb a flight of stairs, ADLs house chores

## 2018-08-31 NOTE — H&P PST ADULT - HISTORY OF PRESENT ILLNESS
72 year old male presents to presurgical testing s/p endovascular abdominal aneyrysm repair in 12/2017. Pt complaining of 6 month history of calf pain with ambulation. Pt completed CT angiogram and right lower leg angiogram. Pt scheduled for right lower extremity femoral tibial bypass with angiogram for 9/6/18.

## 2018-08-31 NOTE — H&P PST ADULT - PROBLEM SELECTOR PLAN 1
Pt is scheduled for right lower extremity femoral tibial bypass with angiogram for 9/6/18.  Pre-op instructions provided. Pepcid provided for GI prophylaxis. Chlorhexidine soap provided for skin prep. Pt stated understanding. Pt will take own pantoprazole on the morning of procedure.     JOHN precautions, OR booking notified.    Pending medical evaluation and cardiac evaluation as per surgeon, with copy of echo and stress test.

## 2018-08-31 NOTE — H&P PST ADULT - PSH
Endoleak post (EVAR) endovascular aneurysm repair  12/2017  S/P cataract surgery  bilateral  S/P ORIF (open reduction internal fixation) fracture  Right ankle, distant past

## 2018-08-31 NOTE — H&P PST ADULT - PMH
AAA (abdominal aortic aneurysm)  s/p repair in 12/2017  GERD (gastroesophageal reflux disease)    HTN (hypertension)    PVD (peripheral vascular disease)    Smoker

## 2018-08-31 NOTE — H&P PST ADULT - NEGATIVE OPHTHALMOLOGIC SYMPTOMS
no loss of vision R/no diplopia/no photophobia/no pain L/no pain R/no loss of vision L/no blurred vision L/no blurred vision R

## 2018-09-01 PROBLEM — I71.4 ABDOMINAL AORTIC ANEURYSM, WITHOUT RUPTURE: Chronic | Status: ACTIVE | Noted: 2018-08-28

## 2018-09-14 ENCOUNTER — APPOINTMENT (OUTPATIENT)
Dept: CARDIOLOGY | Facility: CLINIC | Age: 73
End: 2018-09-14

## 2018-09-21 ENCOUNTER — NON-APPOINTMENT (OUTPATIENT)
Age: 73
End: 2018-09-21

## 2018-09-21 ENCOUNTER — APPOINTMENT (OUTPATIENT)
Dept: CARDIOLOGY | Facility: CLINIC | Age: 73
End: 2018-09-21
Payer: MEDICARE

## 2018-09-21 VITALS
TEMPERATURE: 99 F | OXYGEN SATURATION: 94 % | RESPIRATION RATE: 18 BRPM | SYSTOLIC BLOOD PRESSURE: 131 MMHG | BODY MASS INDEX: 36.57 KG/M2 | HEART RATE: 83 BPM | WEIGHT: 233 LBS | HEIGHT: 67 IN | DIASTOLIC BLOOD PRESSURE: 89 MMHG

## 2018-09-21 VITALS
WEIGHT: 233 LBS | HEART RATE: 122 BPM | TEMPERATURE: 99 F | RESPIRATION RATE: 18 BRPM | OXYGEN SATURATION: 95 % | SYSTOLIC BLOOD PRESSURE: 131 MMHG | DIASTOLIC BLOOD PRESSURE: 89 MMHG | HEIGHT: 67 IN | BODY MASS INDEX: 36.57 KG/M2

## 2018-09-21 DIAGNOSIS — I72.3 ANEURYSM OF ILIAC ARTERY: ICD-10-CM

## 2018-09-21 DIAGNOSIS — Z86.39 PERSONAL HISTORY OF OTHER ENDOCRINE, NUTRITIONAL AND METABOLIC DISEASE: ICD-10-CM

## 2018-09-21 DIAGNOSIS — Z87.19 PERSONAL HISTORY OF OTHER DISEASES OF THE DIGESTIVE SYSTEM: ICD-10-CM

## 2018-09-21 DIAGNOSIS — I71.4 ABDOMINAL AORTIC ANEURYSM, W/OUT RUPTURE: ICD-10-CM

## 2018-09-21 DIAGNOSIS — K64.9 UNSPECIFIED HEMORRHOIDS: ICD-10-CM

## 2018-09-21 DIAGNOSIS — F17.200 NICOTINE DEPENDENCE, UNSPECIFIED, UNCOMPLICATED: ICD-10-CM

## 2018-09-21 DIAGNOSIS — Z87.891 PERSONAL HISTORY OF NICOTINE DEPENDENCE: ICD-10-CM

## 2018-09-21 DIAGNOSIS — I10 ESSENTIAL (PRIMARY) HYPERTENSION: ICD-10-CM

## 2018-09-21 DIAGNOSIS — E78.5 HYPERLIPIDEMIA, UNSPECIFIED: ICD-10-CM

## 2018-09-21 DIAGNOSIS — Z87.81 PERSONAL HISTORY OF (HEALED) TRAUMATIC FRACTURE: ICD-10-CM

## 2018-09-21 DIAGNOSIS — K21.9 GASTRO-ESOPHAGEAL REFLUX DISEASE W/OUT ESOPHAGITIS: ICD-10-CM

## 2018-09-21 DIAGNOSIS — I73.9 PERIPHERAL VASCULAR DISEASE, UNSPECIFIED: ICD-10-CM

## 2018-09-21 DIAGNOSIS — Z82.49 FAMILY HISTORY OF ISCHEMIC HEART DISEASE AND OTHER DISEASES OF THE CIRCULATORY SYSTEM: ICD-10-CM

## 2018-09-21 DIAGNOSIS — I47.1 SUPRAVENTRICULAR TACHYCARDIA: ICD-10-CM

## 2018-09-21 DIAGNOSIS — Z01.810 ENCOUNTER FOR PREPROCEDURAL CARDIOVASCULAR EXAMINATION: ICD-10-CM

## 2018-09-21 DIAGNOSIS — Z87.11 PERSONAL HISTORY OF PEPTIC ULCER DISEASE: ICD-10-CM

## 2018-09-21 DIAGNOSIS — Z86.79 PERSONAL HISTORY OF OTHER DISEASES OF THE CIRCULATORY SYSTEM: ICD-10-CM

## 2018-09-21 DIAGNOSIS — F17.210 NICOTINE DEPENDENCE, CIGARETTES, UNCOMPLICATED: ICD-10-CM

## 2018-09-21 DIAGNOSIS — Z72.0 TOBACCO USE: ICD-10-CM

## 2018-09-21 DIAGNOSIS — K27.9 PEPTIC ULCER, SITE UNSPECIFIED, UNSPECIFIED AS ACUTE OR CHRONIC, W/OUT HEMORRHAGE OR PERFORATION: ICD-10-CM

## 2018-09-21 DIAGNOSIS — I72.4 ANEURYSM OF ARTERY OF LOWER EXTREMITY: ICD-10-CM

## 2018-09-21 PROCEDURE — 99212 OFFICE O/P EST SF 10 MIN: CPT | Mod: 25

## 2018-09-21 PROCEDURE — 99202 OFFICE O/P NEW SF 15 MIN: CPT | Mod: 25

## 2018-09-21 PROCEDURE — 93000 ELECTROCARDIOGRAM COMPLETE: CPT

## 2018-09-21 RX ORDER — ASPIRIN 81 MG
81 TABLET, DELAYED RELEASE (ENTERIC COATED) ORAL DAILY
Refills: 0 | Status: ACTIVE | COMMUNITY

## 2018-09-21 RX ORDER — PANTOPRAZOLE 40 MG/1
40 TABLET, DELAYED RELEASE ORAL TWICE DAILY
Refills: 0 | Status: ACTIVE | COMMUNITY

## 2018-09-21 RX ORDER — FAMOTIDINE 40 MG/1
40 TABLET, FILM COATED ORAL DAILY
Refills: 0 | Status: ACTIVE | COMMUNITY

## 2018-09-21 RX ORDER — NIFEDIPINE 90 MG/1
90 TABLET, EXTENDED RELEASE ORAL DAILY
Refills: 0 | Status: ACTIVE | COMMUNITY

## 2018-09-22 PROBLEM — I47.1 ATRIAL TACHYCARDIA: Status: ACTIVE | Noted: 2018-09-22

## 2018-09-25 ENCOUNTER — TRANSCRIPTION ENCOUNTER (OUTPATIENT)
Age: 73
End: 2018-09-25

## 2018-09-26 ENCOUNTER — INPATIENT (INPATIENT)
Facility: HOSPITAL | Age: 73
LOS: 6 days | Discharge: HOME HEALTH | End: 2018-10-03
Attending: SURGERY | Admitting: SURGERY
Payer: MEDICARE

## 2018-09-26 VITALS
SYSTOLIC BLOOD PRESSURE: 136 MMHG | RESPIRATION RATE: 16 BRPM | HEIGHT: 66.5 IN | WEIGHT: 231.93 LBS | OXYGEN SATURATION: 96 % | DIASTOLIC BLOOD PRESSURE: 81 MMHG | TEMPERATURE: 99 F | HEART RATE: 65 BPM

## 2018-09-26 DIAGNOSIS — Z98.49 CATARACT EXTRACTION STATUS, UNSPECIFIED EYE: Chronic | ICD-10-CM

## 2018-09-26 DIAGNOSIS — Z96.7 PRESENCE OF OTHER BONE AND TENDON IMPLANTS: Chronic | ICD-10-CM

## 2018-09-26 DIAGNOSIS — I96 GANGRENE, NOT ELSEWHERE CLASSIFIED: ICD-10-CM

## 2018-09-26 DIAGNOSIS — T82.330A LEAKAGE OF AORTIC (BIFURCATION) GRAFT (REPLACEMENT), INITIAL ENCOUNTER: Chronic | ICD-10-CM

## 2018-09-26 LAB
BASE EXCESS BLDA CALC-SCNC: -0.8 MMOL/L — SIGNIFICANT CHANGE UP
BASE EXCESS BLDA CALC-SCNC: 0 MMOL/L — SIGNIFICANT CHANGE UP
BASE EXCESS BLDA CALC-SCNC: 0.5 MMOL/L — SIGNIFICANT CHANGE UP
BASE EXCESS BLDA CALC-SCNC: 0.6 MMOL/L — SIGNIFICANT CHANGE UP
BASE EXCESS BLDA CALC-SCNC: 3.1 MMOL/L — SIGNIFICANT CHANGE UP
BLD GP AB SCN SERPL QL: NEGATIVE — SIGNIFICANT CHANGE UP
BUN SERPL-MCNC: 18 MG/DL — SIGNIFICANT CHANGE UP (ref 7–23)
CA-I BLDA-SCNC: 1.09 MMOL/L — LOW (ref 1.15–1.29)
CA-I BLDA-SCNC: 1.12 MMOL/L — LOW (ref 1.15–1.29)
CA-I BLDA-SCNC: 1.13 MMOL/L — LOW (ref 1.15–1.29)
CA-I BLDA-SCNC: 1.16 MMOL/L — SIGNIFICANT CHANGE UP (ref 1.15–1.29)
CA-I BLDA-SCNC: 1.18 MMOL/L — SIGNIFICANT CHANGE UP (ref 1.15–1.29)
CALCIUM SERPL-MCNC: 8.2 MG/DL — LOW (ref 8.4–10.5)
CHLORIDE SERPL-SCNC: 104 MMOL/L — SIGNIFICANT CHANGE UP (ref 98–107)
CO2 SERPL-SCNC: 25 MMOL/L — SIGNIFICANT CHANGE UP (ref 22–31)
CREAT SERPL-MCNC: 1.18 MG/DL — SIGNIFICANT CHANGE UP (ref 0.5–1.3)
GLUCOSE BLDA-MCNC: 121 MG/DL — HIGH (ref 70–99)
GLUCOSE BLDA-MCNC: 91 MG/DL — SIGNIFICANT CHANGE UP (ref 70–99)
GLUCOSE BLDA-MCNC: 94 MG/DL — SIGNIFICANT CHANGE UP (ref 70–99)
GLUCOSE BLDA-MCNC: 95 MG/DL — SIGNIFICANT CHANGE UP (ref 70–99)
GLUCOSE BLDA-MCNC: 96 MG/DL — SIGNIFICANT CHANGE UP (ref 70–99)
GLUCOSE SERPL-MCNC: 144 MG/DL — HIGH (ref 70–99)
HCO3 BLDA-SCNC: 24 MMOL/L — SIGNIFICANT CHANGE UP (ref 22–26)
HCO3 BLDA-SCNC: 25 MMOL/L — SIGNIFICANT CHANGE UP (ref 22–26)
HCO3 BLDA-SCNC: 27 MMOL/L — HIGH (ref 22–26)
HCT VFR BLD CALC: 33.8 % — LOW (ref 39–50)
HCT VFR BLDA CALC: 29.9 % — LOW (ref 39–51)
HCT VFR BLDA CALC: 32.7 % — LOW (ref 39–51)
HCT VFR BLDA CALC: 34.5 % — LOW (ref 39–51)
HCT VFR BLDA CALC: 34.7 % — LOW (ref 39–51)
HCT VFR BLDA CALC: 41.7 % — SIGNIFICANT CHANGE UP (ref 39–51)
HGB BLD-MCNC: 10.6 G/DL — LOW (ref 13–17)
HGB BLDA-MCNC: 10.6 G/DL — LOW (ref 13–17)
HGB BLDA-MCNC: 11.2 G/DL — LOW (ref 13–17)
HGB BLDA-MCNC: 11.3 G/DL — LOW (ref 13–17)
HGB BLDA-MCNC: 13.6 G/DL — SIGNIFICANT CHANGE UP (ref 13–17)
HGB BLDA-MCNC: 9.6 G/DL — LOW (ref 13–17)
MCHC RBC-ENTMCNC: 28.3 PG — SIGNIFICANT CHANGE UP (ref 27–34)
MCHC RBC-ENTMCNC: 31.4 % — LOW (ref 32–36)
MCV RBC AUTO: 90.4 FL — SIGNIFICANT CHANGE UP (ref 80–100)
NRBC # FLD: 0 — SIGNIFICANT CHANGE UP
PCO2 BLDA: 34 MMHG — LOW (ref 35–48)
PCO2 BLDA: 36 MMHG — SIGNIFICANT CHANGE UP (ref 35–48)
PCO2 BLDA: 37 MMHG — SIGNIFICANT CHANGE UP (ref 35–48)
PCO2 BLDA: 38 MMHG — SIGNIFICANT CHANGE UP (ref 35–48)
PCO2 BLDA: 49 MMHG — HIGH (ref 35–48)
PH BLDA: 7.38 PH — SIGNIFICANT CHANGE UP (ref 7.35–7.45)
PH BLDA: 7.42 PH — SIGNIFICANT CHANGE UP (ref 7.35–7.45)
PH BLDA: 7.43 PH — SIGNIFICANT CHANGE UP (ref 7.35–7.45)
PH BLDA: 7.43 PH — SIGNIFICANT CHANGE UP (ref 7.35–7.45)
PH BLDA: 7.45 PH — SIGNIFICANT CHANGE UP (ref 7.35–7.45)
PLATELET # BLD AUTO: 152 K/UL — SIGNIFICANT CHANGE UP (ref 150–400)
PMV BLD: 9.6 FL — SIGNIFICANT CHANGE UP (ref 7–13)
PO2 BLDA: 108 MMHG — SIGNIFICANT CHANGE UP (ref 83–108)
PO2 BLDA: 155 MMHG — HIGH (ref 83–108)
PO2 BLDA: 162 MMHG — HIGH (ref 83–108)
PO2 BLDA: 183 MMHG — HIGH (ref 83–108)
PO2 BLDA: 86 MMHG — SIGNIFICANT CHANGE UP (ref 83–108)
POTASSIUM BLDA-SCNC: 3.7 MMOL/L — SIGNIFICANT CHANGE UP (ref 3.4–4.5)
POTASSIUM BLDA-SCNC: 3.7 MMOL/L — SIGNIFICANT CHANGE UP (ref 3.4–4.5)
POTASSIUM BLDA-SCNC: 4.1 MMOL/L — SIGNIFICANT CHANGE UP (ref 3.4–4.5)
POTASSIUM BLDA-SCNC: 4.2 MMOL/L — SIGNIFICANT CHANGE UP (ref 3.4–4.5)
POTASSIUM BLDA-SCNC: 4.5 MMOL/L — SIGNIFICANT CHANGE UP (ref 3.4–4.5)
POTASSIUM SERPL-MCNC: 4.5 MMOL/L — SIGNIFICANT CHANGE UP (ref 3.5–5.3)
POTASSIUM SERPL-SCNC: 4.5 MMOL/L — SIGNIFICANT CHANGE UP (ref 3.5–5.3)
RBC # BLD: 3.74 M/UL — LOW (ref 4.2–5.8)
RBC # FLD: 14 % — SIGNIFICANT CHANGE UP (ref 10.3–14.5)
RH IG SCN BLD-IMP: POSITIVE — SIGNIFICANT CHANGE UP
SAO2 % BLDA: 96.1 % — SIGNIFICANT CHANGE UP (ref 95–99)
SAO2 % BLDA: 98.2 % — SIGNIFICANT CHANGE UP (ref 95–99)
SAO2 % BLDA: 99.1 % — HIGH (ref 95–99)
SAO2 % BLDA: 99.2 % — HIGH (ref 95–99)
SAO2 % BLDA: 99.2 % — HIGH (ref 95–99)
SODIUM BLDA-SCNC: 136 MMOL/L — SIGNIFICANT CHANGE UP (ref 136–146)
SODIUM BLDA-SCNC: 136 MMOL/L — SIGNIFICANT CHANGE UP (ref 136–146)
SODIUM BLDA-SCNC: 137 MMOL/L — SIGNIFICANT CHANGE UP (ref 136–146)
SODIUM SERPL-SCNC: 139 MMOL/L — SIGNIFICANT CHANGE UP (ref 135–145)
WBC # BLD: 13.32 K/UL — HIGH (ref 3.8–10.5)
WBC # FLD AUTO: 13.32 K/UL — HIGH (ref 3.8–10.5)

## 2018-09-26 PROCEDURE — 35666 BPG FEM-ANT TIB PST TIB/PRNL: CPT

## 2018-09-26 PROCEDURE — 97605 NEG PRS WND THER DME<=50SQCM: CPT

## 2018-09-26 PROCEDURE — 75710 ARTERY X-RAYS ARM/LEG: CPT | Mod: 26

## 2018-09-26 RX ORDER — ONDANSETRON 8 MG/1
4 TABLET, FILM COATED ORAL ONCE
Qty: 0 | Refills: 0 | Status: DISCONTINUED | OUTPATIENT
Start: 2018-09-26 | End: 2018-09-27

## 2018-09-26 RX ORDER — ACETAMINOPHEN 500 MG
325 TABLET ORAL EVERY 4 HOURS
Qty: 0 | Refills: 0 | Status: DISCONTINUED | OUTPATIENT
Start: 2018-09-26 | End: 2018-10-03

## 2018-09-26 RX ORDER — ASPIRIN/CALCIUM CARB/MAGNESIUM 324 MG
81 TABLET ORAL DAILY
Qty: 0 | Refills: 0 | Status: DISCONTINUED | OUTPATIENT
Start: 2018-09-26 | End: 2018-10-03

## 2018-09-26 RX ORDER — OXYCODONE AND ACETAMINOPHEN 5; 325 MG/1; MG/1
2 TABLET ORAL EVERY 4 HOURS
Qty: 0 | Refills: 0 | Status: DISCONTINUED | OUTPATIENT
Start: 2018-09-26 | End: 2018-09-28

## 2018-09-26 RX ORDER — SODIUM CHLORIDE 9 MG/ML
1000 INJECTION, SOLUTION INTRAVENOUS
Qty: 0 | Refills: 0 | Status: DISCONTINUED | OUTPATIENT
Start: 2018-09-26 | End: 2018-09-27

## 2018-09-26 RX ORDER — OXYCODONE AND ACETAMINOPHEN 5; 325 MG/1; MG/1
1 TABLET ORAL EVERY 4 HOURS
Qty: 0 | Refills: 0 | Status: DISCONTINUED | OUTPATIENT
Start: 2018-09-26 | End: 2018-10-02

## 2018-09-26 RX ORDER — CEFAZOLIN SODIUM 1 G
2000 VIAL (EA) INJECTION EVERY 8 HOURS
Qty: 0 | Refills: 0 | Status: COMPLETED | OUTPATIENT
Start: 2018-09-26 | End: 2018-09-27

## 2018-09-26 RX ORDER — PANTOPRAZOLE SODIUM 20 MG/1
40 TABLET, DELAYED RELEASE ORAL
Qty: 0 | Refills: 0 | Status: DISCONTINUED | OUTPATIENT
Start: 2018-09-26 | End: 2018-10-03

## 2018-09-26 RX ORDER — FAMOTIDINE 10 MG/ML
40 INJECTION INTRAVENOUS
Qty: 0 | Refills: 0 | Status: DISCONTINUED | OUTPATIENT
Start: 2018-09-26 | End: 2018-10-03

## 2018-09-26 RX ORDER — HYDROMORPHONE HYDROCHLORIDE 2 MG/ML
1 INJECTION INTRAMUSCULAR; INTRAVENOUS; SUBCUTANEOUS
Qty: 0 | Refills: 0 | Status: DISCONTINUED | OUTPATIENT
Start: 2018-09-26 | End: 2018-09-27

## 2018-09-26 RX ORDER — NIFEDIPINE 30 MG
90 TABLET, EXTENDED RELEASE 24 HR ORAL DAILY
Qty: 0 | Refills: 0 | Status: DISCONTINUED | OUTPATIENT
Start: 2018-09-26 | End: 2018-10-03

## 2018-09-26 RX ORDER — DILTIAZEM HCL 120 MG
240 CAPSULE, EXT RELEASE 24 HR ORAL DAILY
Qty: 0 | Refills: 0 | Status: DISCONTINUED | OUTPATIENT
Start: 2018-09-26 | End: 2018-09-27

## 2018-09-26 RX ADMIN — SODIUM CHLORIDE 100 MILLILITER(S): 9 INJECTION, SOLUTION INTRAVENOUS at 17:30

## 2018-09-26 RX ADMIN — Medication 100 MILLIGRAM(S): at 21:43

## 2018-09-26 RX ADMIN — SODIUM CHLORIDE 100 MILLILITER(S): 9 INJECTION, SOLUTION INTRAVENOUS at 17:31

## 2018-09-26 RX ADMIN — Medication 81 MILLIGRAM(S): at 17:41

## 2018-09-26 RX ADMIN — FAMOTIDINE 40 MILLIGRAM(S): 10 INJECTION INTRAVENOUS at 17:47

## 2018-09-26 NOTE — CHART NOTE - NSCHARTNOTEFT_GEN_A_CORE
POST-OP NOTE:    Procedure: Right fem to PT bypass with PTFE.    Subjective: Pt doing well. Some leg pain. No nausea/vomiting. Sleeping peacefully upon arrival.    Vital Signs Last 24 Hrs  T(C): 36.7 (26 Sep 2018), Max: 37.1 (26 Sep 2018)  T(F): 98.1 (26 Sep 2018), Max: 98.7 (26 Sep 2018)  HR: 72 (26 Sep 2018) (55 - 72)  BP: 122/62 (26 Sep 2018) (119/62 - 150/78)  BP(mean): 77 (26 Sep 2018) (77 - 104)  RR: 18 (26 Sep 2018) (12 - 20)  SpO2: 98% (26 Sep 2018) (96% - 100%)  I&O's Summary    26 Sep 2018  -  26 Sep 2018  --------------------------------------------------------  IN: 1400 mL / OUT: 1215 mL / NET: 185 mL                            10.6   13.32 )-----------( 152      ( 26 Sep 2018 17:00 )             33.8     09-26    139  |  104  |  18  ----------------------------<  144<H>  4.5   |  25  |  1.18    Ca    8.2<L>      26 Sep 2018 17:00         PHYSICAL EXAM:  Gen: NAD, well-developed  Resp: breathing easily, no stridor  CV: no peripheral edema/cyanosis  Abd: obese abdomen, soft, nontender, non-distended. +Wound vac on LLQ of abd.  Right lower extremity: Thigh incision dressing c/d/i. Calf incision dressing c/d/i. No erythema or edema or hematoma surrounding either incision site.  Pulses: Radial pulses 2+ b/l. Right LE: PT 2+.  Mendez with yellow urine.    ASSESSMENT/PLAN:  71 y/o M s/p endovascular abdominal aneurysm repair in 12/2017 who presented with 6 months of calf pain with ambulation, now POD0 s/p Right lower extremity fem-PT bypass with PTFE on 9/26.    - Incisions c/d/i, low risk for bleeding at this point, will start hep gtt at 500/hr with no titration to keep graft patent.  - OOB/IS/ambulation as tolerated.  - PT c/s in AM.  - C/w home meds.  - ASA daily.  - Pain control.  - F/u AM labs.  - C/w hans.

## 2018-09-27 LAB
APTT BLD: 29 SEC — SIGNIFICANT CHANGE UP (ref 27.5–37.4)
APTT BLD: 36.1 SEC — SIGNIFICANT CHANGE UP (ref 27.5–37.4)
BUN SERPL-MCNC: 15 MG/DL — SIGNIFICANT CHANGE UP (ref 7–23)
CALCIUM SERPL-MCNC: 8.6 MG/DL — SIGNIFICANT CHANGE UP (ref 8.4–10.5)
CHLORIDE SERPL-SCNC: 103 MMOL/L — SIGNIFICANT CHANGE UP (ref 98–107)
CO2 SERPL-SCNC: 24 MMOL/L — SIGNIFICANT CHANGE UP (ref 22–31)
CREAT SERPL-MCNC: 0.98 MG/DL — SIGNIFICANT CHANGE UP (ref 0.5–1.3)
GLUCOSE SERPL-MCNC: 143 MG/DL — HIGH (ref 70–99)
HCT VFR BLD CALC: 32 % — LOW (ref 39–50)
HCT VFR BLD CALC: 34 % — LOW (ref 39–50)
HGB BLD-MCNC: 10.1 G/DL — LOW (ref 13–17)
HGB BLD-MCNC: 10.7 G/DL — LOW (ref 13–17)
INR BLD: 1.05 — SIGNIFICANT CHANGE UP (ref 0.88–1.17)
MAGNESIUM SERPL-MCNC: 2.4 MG/DL — SIGNIFICANT CHANGE UP (ref 1.6–2.6)
MCHC RBC-ENTMCNC: 28.4 PG — SIGNIFICANT CHANGE UP (ref 27–34)
MCHC RBC-ENTMCNC: 28.9 PG — SIGNIFICANT CHANGE UP (ref 27–34)
MCHC RBC-ENTMCNC: 31.5 % — LOW (ref 32–36)
MCHC RBC-ENTMCNC: 31.6 % — LOW (ref 32–36)
MCV RBC AUTO: 90.2 FL — SIGNIFICANT CHANGE UP (ref 80–100)
MCV RBC AUTO: 91.4 FL — SIGNIFICANT CHANGE UP (ref 80–100)
NRBC # FLD: 0 — SIGNIFICANT CHANGE UP
NRBC # FLD: 0 — SIGNIFICANT CHANGE UP
PHOSPHATE SERPL-MCNC: 3.1 MG/DL — SIGNIFICANT CHANGE UP (ref 2.5–4.5)
PLATELET # BLD AUTO: 162 K/UL — SIGNIFICANT CHANGE UP (ref 150–400)
PLATELET # BLD AUTO: 172 K/UL — SIGNIFICANT CHANGE UP (ref 150–400)
PMV BLD: 10.1 FL — SIGNIFICANT CHANGE UP (ref 7–13)
PMV BLD: 10.5 FL — SIGNIFICANT CHANGE UP (ref 7–13)
POTASSIUM SERPL-MCNC: 4.6 MMOL/L — SIGNIFICANT CHANGE UP (ref 3.5–5.3)
POTASSIUM SERPL-SCNC: 4.6 MMOL/L — SIGNIFICANT CHANGE UP (ref 3.5–5.3)
PROTHROM AB SERPL-ACNC: 11.7 SEC — SIGNIFICANT CHANGE UP (ref 9.8–13.1)
RBC # BLD: 3.5 M/UL — LOW (ref 4.2–5.8)
RBC # BLD: 3.77 M/UL — LOW (ref 4.2–5.8)
RBC # FLD: 13.7 % — SIGNIFICANT CHANGE UP (ref 10.3–14.5)
RBC # FLD: 13.9 % — SIGNIFICANT CHANGE UP (ref 10.3–14.5)
SODIUM SERPL-SCNC: 139 MMOL/L — SIGNIFICANT CHANGE UP (ref 135–145)
WBC # BLD: 14.86 K/UL — HIGH (ref 3.8–10.5)
WBC # BLD: 17.95 K/UL — HIGH (ref 3.8–10.5)
WBC # FLD AUTO: 14.86 K/UL — HIGH (ref 3.8–10.5)
WBC # FLD AUTO: 17.95 K/UL — HIGH (ref 3.8–10.5)

## 2018-09-27 PROCEDURE — 99222 1ST HOSP IP/OBS MODERATE 55: CPT

## 2018-09-27 PROCEDURE — 93010 ELECTROCARDIOGRAM REPORT: CPT | Mod: 76

## 2018-09-27 RX ORDER — HEPARIN SODIUM 5000 [USP'U]/ML
1400 INJECTION INTRAVENOUS; SUBCUTANEOUS
Qty: 25000 | Refills: 0 | Status: DISCONTINUED | OUTPATIENT
Start: 2018-09-27 | End: 2018-09-28

## 2018-09-27 RX ORDER — HEPARIN SODIUM 5000 [USP'U]/ML
1000 INJECTION INTRAVENOUS; SUBCUTANEOUS
Qty: 25000 | Refills: 0 | Status: DISCONTINUED | OUTPATIENT
Start: 2018-09-27 | End: 2018-09-27

## 2018-09-27 RX ORDER — INFLUENZA VIRUS VACCINE 15; 15; 15; 15 UG/.5ML; UG/.5ML; UG/.5ML; UG/.5ML
0.5 SUSPENSION INTRAMUSCULAR ONCE
Qty: 0 | Refills: 0 | Status: COMPLETED | OUTPATIENT
Start: 2018-09-27 | End: 2018-09-27

## 2018-09-27 RX ORDER — HEPARIN SODIUM 5000 [USP'U]/ML
500 INJECTION INTRAVENOUS; SUBCUTANEOUS
Qty: 25000 | Refills: 0 | Status: DISCONTINUED | OUTPATIENT
Start: 2018-09-27 | End: 2018-09-27

## 2018-09-27 RX ORDER — HEPARIN SODIUM 5000 [USP'U]/ML
8500 INJECTION INTRAVENOUS; SUBCUTANEOUS ONCE
Qty: 0 | Refills: 0 | Status: COMPLETED | OUTPATIENT
Start: 2018-09-27 | End: 2018-09-27

## 2018-09-27 RX ORDER — METOPROLOL TARTRATE 50 MG
25 TABLET ORAL
Qty: 0 | Refills: 0 | Status: DISCONTINUED | OUTPATIENT
Start: 2018-09-27 | End: 2018-10-03

## 2018-09-27 RX ORDER — POLYETHYLENE GLYCOL 3350 17 G/17G
17 POWDER, FOR SOLUTION ORAL DAILY
Qty: 0 | Refills: 0 | Status: DISCONTINUED | OUTPATIENT
Start: 2018-09-27 | End: 2018-10-03

## 2018-09-27 RX ORDER — ATORVASTATIN CALCIUM 80 MG/1
40 TABLET, FILM COATED ORAL AT BEDTIME
Qty: 0 | Refills: 0 | Status: DISCONTINUED | OUTPATIENT
Start: 2018-09-27 | End: 2018-10-03

## 2018-09-27 RX ADMIN — Medication 100 MILLIGRAM(S): at 06:10

## 2018-09-27 RX ADMIN — POLYETHYLENE GLYCOL 3350 17 GRAM(S): 17 POWDER, FOR SOLUTION ORAL at 22:31

## 2018-09-27 RX ADMIN — Medication 240 MILLIGRAM(S): at 06:10

## 2018-09-27 RX ADMIN — Medication 90 MILLIGRAM(S): at 06:10

## 2018-09-27 RX ADMIN — HEPARIN SODIUM 1400 UNIT(S)/HR: 5000 INJECTION INTRAVENOUS; SUBCUTANEOUS at 22:47

## 2018-09-27 RX ADMIN — Medication 325 MILLIGRAM(S): at 02:38

## 2018-09-27 RX ADMIN — Medication 25 MILLIGRAM(S): at 18:36

## 2018-09-27 RX ADMIN — HEPARIN SODIUM 5 UNIT(S)/HR: 5000 INJECTION INTRAVENOUS; SUBCUTANEOUS at 03:38

## 2018-09-27 RX ADMIN — HEPARIN SODIUM 8500 UNIT(S): 5000 INJECTION INTRAVENOUS; SUBCUTANEOUS at 22:46

## 2018-09-27 RX ADMIN — PANTOPRAZOLE SODIUM 40 MILLIGRAM(S): 20 TABLET, DELAYED RELEASE ORAL at 06:10

## 2018-09-27 RX ADMIN — OXYCODONE AND ACETAMINOPHEN 2 TABLET(S): 5; 325 TABLET ORAL at 23:31

## 2018-09-27 RX ADMIN — Medication 100 MILLIGRAM(S): at 14:51

## 2018-09-27 RX ADMIN — FAMOTIDINE 40 MILLIGRAM(S): 10 INJECTION INTRAVENOUS at 06:10

## 2018-09-27 RX ADMIN — OXYCODONE AND ACETAMINOPHEN 2 TABLET(S): 5; 325 TABLET ORAL at 22:31

## 2018-09-27 RX ADMIN — Medication 81 MILLIGRAM(S): at 14:51

## 2018-09-27 RX ADMIN — FAMOTIDINE 40 MILLIGRAM(S): 10 INJECTION INTRAVENOUS at 18:36

## 2018-09-27 RX ADMIN — Medication 325 MILLIGRAM(S): at 02:08

## 2018-09-27 RX ADMIN — HEPARIN SODIUM 10 UNIT(S)/HR: 5000 INJECTION INTRAVENOUS; SUBCUTANEOUS at 14:52

## 2018-09-27 RX ADMIN — ATORVASTATIN CALCIUM 40 MILLIGRAM(S): 80 TABLET, FILM COATED ORAL at 22:31

## 2018-09-27 NOTE — PROGRESS NOTE ADULT - SUBJECTIVE AND OBJECTIVE BOX
Vascular Surgery Progress Note    Subjective:   Pt seen & examined at bedside. Pain controlled. No new complaints. No F/C, N/V, CP/SOB.    Medications:  ceFAZolin   IVPB 2000  aspirin  chewable 81  ceFAZolin   IVPB 2000  diltiazem     heparin  Infusion 500  NIFEdipine XL 90      Vital Signs Last 24 Hrs  T(C): 36.9 (27 Sep 2018 10:00), Max: 36.9 (27 Sep 2018 00:01)  T(F): 98.5 (27 Sep 2018 10:00), Max: 98.5 (27 Sep 2018 10:00)  HR: 73 (27 Sep 2018 10:00) (55 - 116)  BP: 124/63 (27 Sep 2018 10:00) (116/70 - 150/78)  BP(mean): 85 (27 Sep 2018 08:39) (77 - 104)  RR: 20 (27 Sep 2018 10:00) (12 - 24)  SpO2: 94% (27 Sep 2018 10:00) (90% - 100%)    I&O's Summary    26 Sep 2018 07:01  -  27 Sep 2018 07:00  --------------------------------------------------------  IN: 2525 mL / OUT: 2415 mL / NET: 110 mL    27 Sep 2018 07:01  -  27 Sep 2018 11:03  --------------------------------------------------------  IN: 0 mL / OUT: 975 mL / NET: -975 mL        Physical Exam:  Gen: NAD, A&Ox3  CV: No increased WOB  Pulses: PT palpable RLE    LABS:                        10.7   14.86 )-----------( 162      ( 27 Sep 2018 03:00 )             34.0     09-27    139  |  103  |  15  ----------------------------<  143<H>  4.6   |  24  |  0.98    Ca    8.6      27 Sep 2018 03:00  Phos  3.1     09-27  Mg     2.4     09-27      PT/INR - ( 27 Sep 2018 02:40 )   PT: 11.7 SEC;   INR: 1.05          PTT - ( 27 Sep 2018 02:40 )  PTT:29.0 SEC

## 2018-09-27 NOTE — CONSULT NOTE ADULT - ASSESSMENT
72M with HTN, AAA s/p EVAR repair, HLD admitted for RLE fem-tib bypass with post-op tachycardia and abnormal baseline ECG.    #Hx of Atach, ?paroxysmal AT on rhythm strip in chart  -- patient was prescribed metoprolol 25 mg BID  -- can continue metoprolol 25 mg BID rather than diltiazem  -- outpatient med rec does not have diltiazem present (however patient cannot confirm medications he's taking)  -- monitor on tele    #?AFib vs Non-sinus p wave  -- will discuss with attending  -- if atrial fibrillation, will require anticoagulation    Nik Benjamin MD  [id number] 72M with HTN, AAA s/p EVAR repair, HLD admitted for RLE fem-tib bypass with post-op tachycardia and abnormal baseline ECG.    #Hx of Atach, ?aflutter on rhythm strip in chart  -- patient was prescribed metoprolol 25 mg BID  -- can continue metoprolol 25 mg BID rather than diltiazem  -- would discontinue diltiazem  -- outpatient med rec does not have diltiazem present (however patient cannot confirm medications he's taking)  -- monitor on tele  -- check TTE    #Ectopic atrial rhythm  -- no anticoagulation requirements    Nik Benjamin MD  [id number]

## 2018-09-27 NOTE — CONSULT NOTE ADULT - SUBJECTIVE AND OBJECTIVE BOX
Patient seen and evaluated @ 10:30 AM  Chief Complaint: post-RLE bypass    HPI:  72M with HTN, AAA s/p EVAR repair, HLD admitted for RLE fem-tib bypass. Uncomplicated procedure.    Post-op patient HR increased from 60-70 to 110s. Concern for afib with RVR, rhythm strips in chart appear regular and narrow possibly pAT. Patient reports no chest pain, sob, nausea, vomiting.    No prior MI hx. Occasional palpitations in past, generally when he gets excited.    During pre-op workup with Dr. Holm, noted to be in atrial tachycardia and prescribed metoprolol.    PMH:   Smoker  AAA (abdominal aortic aneurysm)  PVD (peripheral vascular disease)  GERD (gastroesophageal reflux disease)  HTN (hypertension)    PSH:   S/P cataract surgery  S/P ORIF (open reduction internal fixation) fracture  Endoleak post (EVAR) endovascular aneurysm repair  No significant past surgical history    Medications:   acetaminophen   Tablet .. 325 milliGRAM(s) Oral every 4 hours PRN  aspirin  chewable 81 milliGRAM(s) Oral daily  atorvastatin 40 milliGRAM(s) Oral at bedtime  ceFAZolin   IVPB 2000 milliGRAM(s) IV Intermittent every 8 hours  diltiazem    milliGRAM(s) Oral daily  famotidine    Tablet 40 milliGRAM(s) Oral two times a day  heparin  Infusion 500 Unit(s)/Hr IV Continuous <Continuous>  influenza   Vaccine 0.5 milliLiter(s) IntraMuscular once  NIFEdipine XL 90 milliGRAM(s) Oral daily  oxyCODONE    5 mG/acetaminophen 325 mG 1 Tablet(s) Oral every 4 hours PRN  oxyCODONE    5 mG/acetaminophen 325 mG 2 Tablet(s) Oral every 4 hours PRN  pantoprazole    Tablet 40 milliGRAM(s) Oral before breakfast    Allergies:  No Known Allergies    FAMILY HISTORY:  No pertinent family history in first degree relatives    Social History:  + tobacco    Review of Systems:  Constitutional: [ ] Fever [ ] Chills [ ] Fatigue [ ] Weight change   HEENT: [ ] Blurred vision [ ] Eye Pain [ ] Headache [ ] Runny nose [ ] Sore Throat   Respiratory: [ ] Cough [ ] Wheezing [ ] Shortness of breath  Cardiovascular: [ ] Chest Pain [ ] Palpitations [ ] FITZGERALD [ ] PND [ ] Orthopnea  Gastrointestinal: [ ] Abdominal Pain [ ] Diarrhea [ ] Constipation [ ] Hemorrhoids [ ] Nausea [ ] Vomiting  Genitourinary: [ ] Nocturia [ ] Dysuria [ ] Incontinence  Extremities: [ ] Swelling [ ] Joint Pain  Neurologic: [ ] Focal deficit [ ] Paresthesias [ ] Syncope  Lymphatic: [ ] Swelling [ ] Lymphadenopathy   Skin: [ ] Rash [ ] Ecchymoses [ ] Wounds [ ] Lesions  Psychiatry: [ ] Depression [ ] Suicidal/Homicidal Ideation [ ] Anxiety [ ] Sleep Disturbances  [x] 10 point review of systems is otherwise negative except as mentioned above            [ ]Unable to obtain    Physical Exam:  T(C): 36.9 (18 @ 10:00), Max: 36.9 (18 @ 00:01)  HR: 73 (18 @ 10:00) (55 - 116)  BP: 124/63 (18 @ 10:00) (116/70 - 150/78)  RR: 20 (18 @ 10:00) (12 - 24)  SpO2: 94% (18 @ 10:00) (90% - 100%)  Wt(kg): --     @ 07:01  -   @ 07:00  --------------------------------------------------------  IN: 2525 mL / OUT: 2415 mL / NET: 110 mL     @ 07:01  -   @ 10:53  --------------------------------------------------------  IN: 0 mL / OUT: 975 mL / NET: -975 mL      Daily     Daily     Appearance: NAD  Eyes: PERRL, EOMI  HENT: Normal oral muscosa, NC/AT  Cardiovascular: normal S1 and S2, RRR, no m/r/g, no edema, normal JVP  Respiratory: Clear to auscultation bilaterally  Gastrointestinal: Soft, non-tender, non-distended, BS+  Musculoskeletal: No clubbing, no joint deformity   Neurologic: Non-focal  Lymphatic: No lymphadenopathy  Psychiatry: AAOx3, mood & affect appropriate  Skin: No rashes, no ecchymoses, no cyanosis    Cardiovascular Diagnostic Testing:  ECG: afib vs non-sinus rhythm with PACs    Echo:  None    Stress Testin2017  IMPRESSIONS:Normal Study  * Myocardial Perfusion SPECT results are normal.  * Normal myocardial perfusion scan,with no evidence of  infarction or inducible ischemia.  * Post-stress gated wall motion analysis was performed  (LVEF = 54 %;LVEDV = 138 ml.), revealing normal LV  function. RV function appeared normal.    Cath: None    Interpretation of Telemetry: non-sinus rhythm with PACs    Imaging:  CXR 17  Retrocardiac opacity which could be due to atelectasis and/or pneumonia.   Opacity was present in this region on the prior CT scan.    CT A/P  IMPRESSION:     Endoleak.    Stable infrarenal abdominal aortic aneurysm sac measuring 4.0 x 3.9 cm   with bilateral common iliac artery aneurysms as described.    Labs:                        10.7   14.86 )-----------( 162      ( 27 Sep 2018 03:00 )             34.0         139  |  103  |  15  ----------------------------<  143<H>  4.6   |  24  |  0.98    Ca    8.6      27 Sep 2018 03:00  Phos  3.1       Mg     2.4           PT/INR - ( 27 Sep 2018 02:40 )   PT: 11.7 SEC;   INR: 1.05          PTT - ( 27 Sep 2018 02:40 )  PTT:29.0 SEC Patient seen and evaluated @ 10:30 AM  Chief Complaint: post-RLE bypass    HPI:  72M with HTN, AAA s/p EVAR repair, HLD admitted for RLE fem-tib bypass. Uncomplicated procedure.    Post-op patient HR increased from 60-70 to 110s. Concern for afib with RVR, rhythm strips in chart appear regular and narrow possibly pAT. Patient reports no chest pain, sob, nausea, vomiting.    No prior MI hx. Occasional palpitations in past, generally when he gets excited.    During pre-op workup with Dr. Holm, noted to be in atrial tachycardia and prescribed metoprolol.    PMH:   Smoker  AAA (abdominal aortic aneurysm)  PVD (peripheral vascular disease)  GERD (gastroesophageal reflux disease)  HTN (hypertension)    PSH:   S/P cataract surgery  S/P ORIF (open reduction internal fixation) fracture  Endoleak post (EVAR) endovascular aneurysm repair  No significant past surgical history    Medications:   acetaminophen   Tablet .. 325 milliGRAM(s) Oral every 4 hours PRN  aspirin  chewable 81 milliGRAM(s) Oral daily  atorvastatin 40 milliGRAM(s) Oral at bedtime  ceFAZolin   IVPB 2000 milliGRAM(s) IV Intermittent every 8 hours  diltiazem    milliGRAM(s) Oral daily  famotidine    Tablet 40 milliGRAM(s) Oral two times a day  heparin  Infusion 500 Unit(s)/Hr IV Continuous <Continuous>  influenza   Vaccine 0.5 milliLiter(s) IntraMuscular once  NIFEdipine XL 90 milliGRAM(s) Oral daily  oxyCODONE    5 mG/acetaminophen 325 mG 1 Tablet(s) Oral every 4 hours PRN  oxyCODONE    5 mG/acetaminophen 325 mG 2 Tablet(s) Oral every 4 hours PRN  pantoprazole    Tablet 40 milliGRAM(s) Oral before breakfast    Allergies:  No Known Allergies    FAMILY HISTORY:  No pertinent family history in first degree relatives    Social History:  + tobacco    Review of Systems:  Constitutional: [ ] Fever [ ] Chills [ ] Fatigue [ ] Weight change   HEENT: [ ] Blurred vision [ ] Eye Pain [ ] Headache [ ] Runny nose [ ] Sore Throat   Respiratory: [ ] Cough [ ] Wheezing [ ] Shortness of breath  Cardiovascular: [ ] Chest Pain [ ] Palpitations [ ] FITZGERALD [ ] PND [ ] Orthopnea  Gastrointestinal: [ ] Abdominal Pain [ ] Diarrhea [ ] Constipation [ ] Hemorrhoids [ ] Nausea [ ] Vomiting  Genitourinary: [ ] Nocturia [ ] Dysuria [ ] Incontinence  Extremities: [ ] Swelling [ ] Joint Pain  Neurologic: [ ] Focal deficit [ ] Paresthesias [ ] Syncope  Lymphatic: [ ] Swelling [ ] Lymphadenopathy   Skin: [ ] Rash [ ] Ecchymoses [ ] Wounds [ ] Lesions  Psychiatry: [ ] Depression [ ] Suicidal/Homicidal Ideation [ ] Anxiety [ ] Sleep Disturbances  [x] 10 point review of systems is otherwise negative except as mentioned above            [ ]Unable to obtain    Physical Exam:  T(C): 36.9 (18 @ 10:00), Max: 36.9 (18 @ 00:01)  HR: 73 (18 @ 10:00) (55 - 116)  BP: 124/63 (18 @ 10:00) (116/70 - 150/78)  RR: 20 (18 @ 10:00) (12 - 24)  SpO2: 94% (18 @ 10:00) (90% - 100%)  Wt(kg): --     @ 07:01  -   @ 07:00  --------------------------------------------------------  IN: 2525 mL / OUT: 2415 mL / NET: 110 mL     @ 07:01  -   @ 10:53  --------------------------------------------------------  IN: 0 mL / OUT: 975 mL / NET: -975 mL      Daily     Daily     Appearance: NAD  Eyes: PERRL, EOMI  HENT: Normal oral muscosa, NC/AT  Cardiovascular: normal S1 and S2, RRR, no m/r/g, no edema, normal JVP  Respiratory: Clear to auscultation bilaterally  Gastrointestinal: Soft, non-tender, non-distended, BS+  Musculoskeletal: No clubbing, no joint deformity   Neurologic: Non-focal  Lymphatic: No lymphadenopathy  Psychiatry: AAOx3, mood & affect appropriate  Skin: No rashes, no ecchymoses, no cyanosis    Cardiovascular Diagnostic Testing:  ECG: ectopic atrial rhythm    Echo:  None    Stress Testin2017  IMPRESSIONS:Normal Study  * Myocardial Perfusion SPECT results are normal.  * Normal myocardial perfusion scan,with no evidence of  infarction or inducible ischemia.  * Post-stress gated wall motion analysis was performed  (LVEF = 54 %;LVEDV = 138 ml.), revealing normal LV  function. RV function appeared normal.    Cath: None    Interpretation of Telemetry: non-sinus rhythm with PACs    Imaging:  CXR 17  Retrocardiac opacity which could be due to atelectasis and/or pneumonia.   Opacity was present in this region on the prior CT scan.    CT A/P  IMPRESSION:     Endoleak.    Stable infrarenal abdominal aortic aneurysm sac measuring 4.0 x 3.9 cm   with bilateral common iliac artery aneurysms as described.    Labs:                        10.7   14.86 )-----------( 162      ( 27 Sep 2018 03:00 )             34.0         139  |  103  |  15  ----------------------------<  143<H>  4.6   |  24  |  0.98    Ca    8.6      27 Sep 2018 03:00  Phos  3.1       Mg     2.4           PT/INR - ( 27 Sep 2018 02:40 )   PT: 11.7 SEC;   INR: 1.05          PTT - ( 27 Sep 2018 02:40 )  PTT:29.0 SEC

## 2018-09-27 NOTE — PROGRESS NOTE ADULT - ASSESSMENT
71 y/o M s/p endovascular abdominal aneurysm repair in 12/2017 who presented with 6 months of calf pain with ambulation, now POD1 s/p Right lower extremity fem-PT bypass with PTFE.    Plan:  - episode of afib, started on metoprolol q6h to control rate, now converted to junctional rhythm  - transfer to telemetry for cardiac monitoring, cardiology consulted (Dr. Fong)  - DC'd maxwell  - started hep gtt at 500/hr with no titration to keep graft patent  - PT/OOB/ambulate as tolerated  - C/w home meds  - ASA daily  - Pain control

## 2018-09-28 LAB
APTT BLD: 159.4 SEC — CRITICAL HIGH (ref 27.5–37.4)
APTT BLD: 45.7 SEC — HIGH (ref 27.5–37.4)
APTT BLD: 49.2 SEC — HIGH (ref 27.5–37.4)
BUN SERPL-MCNC: 12 MG/DL — SIGNIFICANT CHANGE UP (ref 7–23)
CALCIUM SERPL-MCNC: 8.8 MG/DL — SIGNIFICANT CHANGE UP (ref 8.4–10.5)
CHLORIDE SERPL-SCNC: 102 MMOL/L — SIGNIFICANT CHANGE UP (ref 98–107)
CO2 SERPL-SCNC: 23 MMOL/L — SIGNIFICANT CHANGE UP (ref 22–31)
CREAT SERPL-MCNC: 0.99 MG/DL — SIGNIFICANT CHANGE UP (ref 0.5–1.3)
GLUCOSE SERPL-MCNC: 144 MG/DL — HIGH (ref 70–99)
HCT VFR BLD CALC: 30.8 % — LOW (ref 39–50)
HGB BLD-MCNC: 10 G/DL — LOW (ref 13–17)
INR BLD: 1.09 — SIGNIFICANT CHANGE UP (ref 0.88–1.17)
MAGNESIUM SERPL-MCNC: 2.2 MG/DL — SIGNIFICANT CHANGE UP (ref 1.6–2.6)
MCHC RBC-ENTMCNC: 29.8 PG — SIGNIFICANT CHANGE UP (ref 27–34)
MCHC RBC-ENTMCNC: 32.5 % — SIGNIFICANT CHANGE UP (ref 32–36)
MCV RBC AUTO: 91.7 FL — SIGNIFICANT CHANGE UP (ref 80–100)
NRBC # FLD: 0 — SIGNIFICANT CHANGE UP
PHOSPHATE SERPL-MCNC: 3.5 MG/DL — SIGNIFICANT CHANGE UP (ref 2.5–4.5)
PLATELET # BLD AUTO: 165 K/UL — SIGNIFICANT CHANGE UP (ref 150–400)
PMV BLD: 10.5 FL — SIGNIFICANT CHANGE UP (ref 7–13)
POTASSIUM SERPL-MCNC: 4.1 MMOL/L — SIGNIFICANT CHANGE UP (ref 3.5–5.3)
POTASSIUM SERPL-SCNC: 4.1 MMOL/L — SIGNIFICANT CHANGE UP (ref 3.5–5.3)
PROTHROM AB SERPL-ACNC: 12.5 SEC — SIGNIFICANT CHANGE UP (ref 9.8–13.1)
RBC # BLD: 3.36 M/UL — LOW (ref 4.2–5.8)
RBC # FLD: 14.2 % — SIGNIFICANT CHANGE UP (ref 10.3–14.5)
SODIUM SERPL-SCNC: 139 MMOL/L — SIGNIFICANT CHANGE UP (ref 135–145)
WBC # BLD: 18.2 K/UL — HIGH (ref 3.8–10.5)
WBC # FLD AUTO: 18.2 K/UL — HIGH (ref 3.8–10.5)

## 2018-09-28 PROCEDURE — 93306 TTE W/DOPPLER COMPLETE: CPT | Mod: 26

## 2018-09-28 RX ORDER — WARFARIN SODIUM 2.5 MG/1
5 TABLET ORAL ONCE
Qty: 0 | Refills: 0 | Status: COMPLETED | OUTPATIENT
Start: 2018-09-28 | End: 2018-09-28

## 2018-09-28 RX ORDER — HEPARIN SODIUM 5000 [USP'U]/ML
1500 INJECTION INTRAVENOUS; SUBCUTANEOUS
Qty: 25000 | Refills: 0 | Status: DISCONTINUED | OUTPATIENT
Start: 2018-09-28 | End: 2018-09-29

## 2018-09-28 RX ORDER — HEPARIN SODIUM 5000 [USP'U]/ML
1100 INJECTION INTRAVENOUS; SUBCUTANEOUS
Qty: 25000 | Refills: 0 | Status: DISCONTINUED | OUTPATIENT
Start: 2018-09-28 | End: 2018-09-28

## 2018-09-28 RX ADMIN — Medication 25 MILLIGRAM(S): at 05:51

## 2018-09-28 RX ADMIN — POLYETHYLENE GLYCOL 3350 17 GRAM(S): 17 POWDER, FOR SOLUTION ORAL at 11:38

## 2018-09-28 RX ADMIN — OXYCODONE AND ACETAMINOPHEN 2 TABLET(S): 5; 325 TABLET ORAL at 11:45

## 2018-09-28 RX ADMIN — HEPARIN SODIUM 1500 UNIT(S)/HR: 5000 INJECTION INTRAVENOUS; SUBCUTANEOUS at 22:40

## 2018-09-28 RX ADMIN — PANTOPRAZOLE SODIUM 40 MILLIGRAM(S): 20 TABLET, DELAYED RELEASE ORAL at 06:17

## 2018-09-28 RX ADMIN — WARFARIN SODIUM 5 MILLIGRAM(S): 2.5 TABLET ORAL at 18:27

## 2018-09-28 RX ADMIN — Medication 90 MILLIGRAM(S): at 05:51

## 2018-09-28 RX ADMIN — ATORVASTATIN CALCIUM 40 MILLIGRAM(S): 80 TABLET, FILM COATED ORAL at 22:02

## 2018-09-28 RX ADMIN — OXYCODONE AND ACETAMINOPHEN 2 TABLET(S): 5; 325 TABLET ORAL at 12:45

## 2018-09-28 RX ADMIN — Medication 81 MILLIGRAM(S): at 11:39

## 2018-09-28 RX ADMIN — Medication 25 MILLIGRAM(S): at 18:27

## 2018-09-28 RX ADMIN — OXYCODONE AND ACETAMINOPHEN 2 TABLET(S): 5; 325 TABLET ORAL at 19:20

## 2018-09-28 RX ADMIN — OXYCODONE AND ACETAMINOPHEN 2 TABLET(S): 5; 325 TABLET ORAL at 18:26

## 2018-09-28 RX ADMIN — HEPARIN SODIUM 11 UNIT(S)/HR: 5000 INJECTION INTRAVENOUS; SUBCUTANEOUS at 06:04

## 2018-09-28 RX ADMIN — FAMOTIDINE 40 MILLIGRAM(S): 10 INJECTION INTRAVENOUS at 18:27

## 2018-09-28 RX ADMIN — HEPARIN SODIUM 13 UNIT(S)/HR: 5000 INJECTION INTRAVENOUS; SUBCUTANEOUS at 13:55

## 2018-09-28 RX ADMIN — FAMOTIDINE 40 MILLIGRAM(S): 10 INJECTION INTRAVENOUS at 05:51

## 2018-09-28 NOTE — PROGRESS NOTE ADULT - SUBJECTIVE AND OBJECTIVE BOX
Vascular Surgery Progress Note:    Subjective:  No acute events overnight.  Patient examined at bedside.  No new c/o.    Objective:  Gen: NAD, A&Ox3  CV: No increased WOB  Pulses: PT palpable RLE    MEDICATIONS  (STANDING):  aspirin  chewable 81 milliGRAM(s) Oral daily  atorvastatin 40 milliGRAM(s) Oral at bedtime  famotidine    Tablet 40 milliGRAM(s) Oral two times a day  heparin  Infusion 1100 Unit(s)/Hr (11 mL/Hr) IV Continuous <Continuous>  influenza   Vaccine 0.5 milliLiter(s) IntraMuscular once  metoprolol tartrate 25 milliGRAM(s) Oral two times a day  NIFEdipine XL 90 milliGRAM(s) Oral daily  pantoprazole    Tablet 40 milliGRAM(s) Oral before breakfast  polyethylene glycol 3350 17 Gram(s) Oral daily    MEDICATIONS  (PRN):  acetaminophen   Tablet .. 325 milliGRAM(s) Oral every 4 hours PRN Mild Pain (1 - 3)  oxyCODONE    5 mG/acetaminophen 325 mG 1 Tablet(s) Oral every 4 hours PRN Moderate Pain (4 - 6)  oxyCODONE    5 mG/acetaminophen 325 mG 2 Tablet(s) Oral every 4 hours PRN Severe Pain (7 - 10)      Vital Signs Last 24 Hrs  T(C): 36.7 (28 Sep 2018 05:49), Max: 37.2 (28 Sep 2018 01:58)  T(F): 98 (28 Sep 2018 05:49), Max: 98.9 (28 Sep 2018 01:58)  HR: 65 (28 Sep 2018 05:49) (62 - 86)  BP: 127/70 (28 Sep 2018 05:49) (113/66 - 129/67)  BP(mean): --  RR: 18 (28 Sep 2018 05:49) (18 - 20)  SpO2: 93% (28 Sep 2018 05:49) (92% - 98%)    I&O's Detail    27 Sep 2018 07:01  -  28 Sep 2018 07:00  --------------------------------------------------------  IN:  Total IN: 0 mL    OUT:    Indwelling Catheter - Urethral: 975 mL    Voided: 1675 mL  Total OUT: 2650 mL    Total NET: -2650 mL      28 Sep 2018 07:01  -  28 Sep 2018 09:46  --------------------------------------------------------  IN:  Total IN: 0 mL    OUT:    Voided: 300 mL  Total OUT: 300 mL    Total NET: -300 mL          LABS:                        10.0   18.20 )-----------( 165      ( 28 Sep 2018 03:00 )             30.8     09-28    139  |  102  |  12  ----------------------------<  144<H>  4.1   |  23  |  0.99    Ca    8.8      28 Sep 2018 03:00  Phos  3.5     09-28  Mg     2.2     09-28      PT/INR - ( 28 Sep 2018 03:00 )   PT: 12.5 SEC;   INR: 1.09          PTT - ( 28 Sep 2018 03:00 )  PTT:159.4 SEC

## 2018-09-28 NOTE — PHYSICAL THERAPY INITIAL EVALUATION ADULT - ADDITIONAL COMMENTS
Pt. was left seated in chair post PT Evaluation, NAD, call bell within reach, all lines intact. RN aware of pt. status.

## 2018-09-28 NOTE — PHYSICAL THERAPY INITIAL EVALUATION ADULT - GENERAL OBSERVATIONS, REHAB EVAL
Consult received, chart reviewed. Patient received seated at EOB, NAD, +wound vac. Patient agreed to Evaluation from Physical Therapist.

## 2018-09-28 NOTE — PROGRESS NOTE ADULT - ASSESSMENT
72M with HTN, AAA s/p EVAR repair, HLD admitted for RLE fem-tib bypass with post-op tachycardia and abnormal baseline ECG.    #Hx of Atach, ?aflutter on rhythm strip in chart  -- patient was prescribed metoprolol 25 mg BID  -- can continue metoprolol 25 mg BID  -- monitor on tele  -- check TTE    #Ectopic atrial rhythm  -- no anticoagulation requirements    Nik Benjamin MD  [id number]

## 2018-09-28 NOTE — PROGRESS NOTE ADULT - SUBJECTIVE AND OBJECTIVE BOX
Cardiology Progress Note    Interval events: No acute events overnight. No chest pain or palpitations.    Tele: ectopic atrial rhythm, PVCs    Medications:  acetaminophen   Tablet .. 325 milliGRAM(s) Oral every 4 hours PRN  aspirin  chewable 81 milliGRAM(s) Oral daily  atorvastatin 40 milliGRAM(s) Oral at bedtime  famotidine    Tablet 40 milliGRAM(s) Oral two times a day  heparin  Infusion 1100 Unit(s)/Hr IV Continuous <Continuous>  influenza   Vaccine 0.5 milliLiter(s) IntraMuscular once  metoprolol tartrate 25 milliGRAM(s) Oral two times a day  NIFEdipine XL 90 milliGRAM(s) Oral daily  oxyCODONE    5 mG/acetaminophen 325 mG 1 Tablet(s) Oral every 4 hours PRN  oxyCODONE    5 mG/acetaminophen 325 mG 2 Tablet(s) Oral every 4 hours PRN  pantoprazole    Tablet 40 milliGRAM(s) Oral before breakfast  polyethylene glycol 3350 17 Gram(s) Oral daily      Review of Systems:  Constitutional: [ ] Fever [ ] Chills [ ] Fatigue [ ] Weight change   HEENT: [ ] Blurred vision [ ] Eye Pain [ ] Headache [ ] Runny nose [ ] Sore Throat   Respiratory: [ ] Cough [ ] Wheezing [ ] Shortness of breath  Cardiovascular: [ ] Chest Pain [ ] Palpitations [ ] FITZGERALD [ ] PND [ ] Orthopnea  Gastrointestinal: [ ] Abdominal Pain [ ] Diarrhea [ ] Constipation [ ] Hemorrhoids [ ] Nausea [ ] Vomiting  Genitourinary: [ ] Nocturia [ ] Dysuria [ ] Incontinence  Extremities: [ ] Swelling [ ] Joint Pain  Neurologic: [ ] Focal deficit [ ] Paresthesias [ ] Syncope  Lymphatic: [ ] Swelling [ ] Lymphadenopathy   Skin: [ ] Rash [ ] Ecchymoses [ ] Wounds [ ] Lesions  Psychiatry: [ ] Depression [ ] Suicidal/Homicidal Ideation [ ] Anxiety [ ] Sleep Disturbances  [ ] 10 point review of systems is otherwise negative except as mentioned above            [ ]Unable to obtain    Vitals:  T(C): 36.7 (09-28-18 @ 05:49), Max: 37.2 (09-28-18 @ 01:58)  HR: 65 (09-28-18 @ 05:49) (62 - 116)  BP: 127/70 (09-28-18 @ 05:49) (113/66 - 142/84)  BP(mean): 85 (09-27-18 @ 08:39) (85 - 103)  RR: 18 (09-28-18 @ 05:49) (18 - 24)  SpO2: 93% (09-28-18 @ 05:49) (90% - 98%)  Wt(kg): --  Daily     Daily   I&O's Summary    26 Sep 2018 07:01  -  27 Sep 2018 07:00  --------------------------------------------------------  IN: 2525 mL / OUT: 2415 mL / NET: 110 mL    27 Sep 2018 07:01  -  28 Sep 2018 06:48  --------------------------------------------------------  IN: 0 mL / OUT: 2400 mL / NET: -2400 mL        Physical Exam:  Appearance: NAD  Eyes: PERRL, EOMI  HENT: Normal oral muscosa, NC/AT  Cardiovascular: normal S1 and S2, RRR, no m/r/g, no edema, normal JVP  Respiratory: Clear to auscultation bilaterally  Gastrointestinal: Soft, non-tender, non-distended, BS+  Musculoskeletal: No clubbing, no joint deformity   Neurologic: Non-focal  Lymphatic: No lymphadenopathy  Psychiatry: AAOx3, mood & affect appropriate  Skin: No rashes, no ecchymoses, no cyanosis    Labs:                        10.0   18.20 )-----------( 165      ( 28 Sep 2018 03:00 )             30.8     09-28    139  |  102  |  12  ----------------------------<  144<H>  4.1   |  23  |  0.99    Ca    8.8      28 Sep 2018 03:00  Phos  3.5     09-28  Mg     2.2     09-28      PT/INR - ( 28 Sep 2018 03:00 )   PT: 12.5 SEC;   INR: 1.09          PTT - ( 28 Sep 2018 03:00 )  PTT:159.4 SEC              New results/imaging:

## 2018-09-28 NOTE — PROGRESS NOTE ADULT - ASSESSMENT
71 y/o M s/p endovascular abdominal aneurysm repair in 12/2017 who presented with 6 months of calf pain with ambulation, now s/p Right lower extremity fem-PT bypass with PTFE on 9/26.    Plan:  - episode of afib, started on metoprolol q6h to control rate, now converted to junctional rhythm.  - transferred to telemetry for cardiac monitoring, cardiology consulted (Dr. Fong).  - hep gtt to keep graft patent. Will adjust rate PRN per aptt. Plan to transition to coumadin eventually.  - PT/OOB/ambulate as tolerated.  - C/w home meds.  - ASA daily.  - Pain control.

## 2018-09-28 NOTE — PHYSICAL THERAPY INITIAL EVALUATION ADULT - CRITERIA FOR SKILLED THERAPEUTIC INTERVENTIONS
therapy frequency/predicted duration of therapy intervention/rehab potential/anticipated discharge recommendation/anticipated equipment needs at discharge/impairments found/risk reduction/prevention

## 2018-09-29 LAB
APTT BLD: 45.8 SEC — HIGH (ref 27.5–37.4)
APTT BLD: 58 SEC — HIGH (ref 27.5–37.4)
APTT BLD: 69.4 SEC — HIGH (ref 27.5–37.4)
BUN SERPL-MCNC: 9 MG/DL — SIGNIFICANT CHANGE UP (ref 7–23)
CALCIUM SERPL-MCNC: 8.6 MG/DL — SIGNIFICANT CHANGE UP (ref 8.4–10.5)
CHLORIDE SERPL-SCNC: 99 MMOL/L — SIGNIFICANT CHANGE UP (ref 98–107)
CO2 SERPL-SCNC: 26 MMOL/L — SIGNIFICANT CHANGE UP (ref 22–31)
CREAT SERPL-MCNC: 0.99 MG/DL — SIGNIFICANT CHANGE UP (ref 0.5–1.3)
GLUCOSE SERPL-MCNC: 130 MG/DL — HIGH (ref 70–99)
HCT VFR BLD CALC: 28.3 % — LOW (ref 39–50)
HGB BLD-MCNC: 9 G/DL — LOW (ref 13–17)
INR BLD: 1.14 — SIGNIFICANT CHANGE UP (ref 0.88–1.17)
MAGNESIUM SERPL-MCNC: 2.2 MG/DL — SIGNIFICANT CHANGE UP (ref 1.6–2.6)
MCHC RBC-ENTMCNC: 28.2 PG — SIGNIFICANT CHANGE UP (ref 27–34)
MCHC RBC-ENTMCNC: 31.8 % — LOW (ref 32–36)
MCV RBC AUTO: 88.7 FL — SIGNIFICANT CHANGE UP (ref 80–100)
NRBC # FLD: 0 — SIGNIFICANT CHANGE UP
PHOSPHATE SERPL-MCNC: 3.3 MG/DL — SIGNIFICANT CHANGE UP (ref 2.5–4.5)
PLATELET # BLD AUTO: 163 K/UL — SIGNIFICANT CHANGE UP (ref 150–400)
PMV BLD: 10.2 FL — SIGNIFICANT CHANGE UP (ref 7–13)
POTASSIUM SERPL-MCNC: 3.7 MMOL/L — SIGNIFICANT CHANGE UP (ref 3.5–5.3)
POTASSIUM SERPL-SCNC: 3.7 MMOL/L — SIGNIFICANT CHANGE UP (ref 3.5–5.3)
PROTHROM AB SERPL-ACNC: 12.7 SEC — SIGNIFICANT CHANGE UP (ref 9.8–13.1)
RBC # BLD: 3.19 M/UL — LOW (ref 4.2–5.8)
RBC # FLD: 13.9 % — SIGNIFICANT CHANGE UP (ref 10.3–14.5)
SODIUM SERPL-SCNC: 138 MMOL/L — SIGNIFICANT CHANGE UP (ref 135–145)
WBC # BLD: 14.35 K/UL — HIGH (ref 3.8–10.5)
WBC # FLD AUTO: 14.35 K/UL — HIGH (ref 3.8–10.5)

## 2018-09-29 RX ORDER — HEPARIN SODIUM 5000 [USP'U]/ML
1700 INJECTION INTRAVENOUS; SUBCUTANEOUS
Qty: 25000 | Refills: 0 | Status: DISCONTINUED | OUTPATIENT
Start: 2018-09-29 | End: 2018-10-01

## 2018-09-29 RX ORDER — WARFARIN SODIUM 2.5 MG/1
5 TABLET ORAL ONCE
Qty: 0 | Refills: 0 | Status: COMPLETED | OUTPATIENT
Start: 2018-09-29 | End: 2018-09-29

## 2018-09-29 RX ADMIN — Medication 90 MILLIGRAM(S): at 05:17

## 2018-09-29 RX ADMIN — PANTOPRAZOLE SODIUM 40 MILLIGRAM(S): 20 TABLET, DELAYED RELEASE ORAL at 05:17

## 2018-09-29 RX ADMIN — HEPARIN SODIUM 1700 UNIT(S)/HR: 5000 INJECTION INTRAVENOUS; SUBCUTANEOUS at 06:30

## 2018-09-29 RX ADMIN — OXYCODONE AND ACETAMINOPHEN 1 TABLET(S): 5; 325 TABLET ORAL at 22:05

## 2018-09-29 RX ADMIN — ATORVASTATIN CALCIUM 40 MILLIGRAM(S): 80 TABLET, FILM COATED ORAL at 22:05

## 2018-09-29 RX ADMIN — FAMOTIDINE 40 MILLIGRAM(S): 10 INJECTION INTRAVENOUS at 17:20

## 2018-09-29 RX ADMIN — OXYCODONE AND ACETAMINOPHEN 1 TABLET(S): 5; 325 TABLET ORAL at 22:44

## 2018-09-29 RX ADMIN — POLYETHYLENE GLYCOL 3350 17 GRAM(S): 17 POWDER, FOR SOLUTION ORAL at 12:00

## 2018-09-29 RX ADMIN — Medication 25 MILLIGRAM(S): at 17:20

## 2018-09-29 RX ADMIN — WARFARIN SODIUM 5 MILLIGRAM(S): 2.5 TABLET ORAL at 17:20

## 2018-09-29 RX ADMIN — Medication 25 MILLIGRAM(S): at 05:17

## 2018-09-29 RX ADMIN — HEPARIN SODIUM 1700 UNIT(S)/HR: 5000 INJECTION INTRAVENOUS; SUBCUTANEOUS at 13:56

## 2018-09-29 RX ADMIN — FAMOTIDINE 40 MILLIGRAM(S): 10 INJECTION INTRAVENOUS at 05:17

## 2018-09-29 RX ADMIN — Medication 81 MILLIGRAM(S): at 12:00

## 2018-09-29 RX ADMIN — HEPARIN SODIUM 1700 UNIT(S)/HR: 5000 INJECTION INTRAVENOUS; SUBCUTANEOUS at 20:47

## 2018-09-29 NOTE — PROGRESS NOTE ADULT - ASSESSMENT
71 y/o M s/p endovascular abdominal aneurysm repair in 12/2017 who presented with 6 months of calf pain with ambulation, now s/p Right lower extremity fem-PT bypass with PTFE on 9/26.    Plan:  - episode of afib, started on metoprolol q6h to control rate, now converted to junctional rhythm.  - transferred to telemetry for cardiac monitoring, cardiology consulted (Dr. Fong).  - hep gtt to keep graft patent. Will adjust rate PRN per aptt. Started coumadin nightly, will f/u INR daily.  - PT/OOB/ambulate as tolerated.  - C/w home meds.  - ASA daily.  - Pain control.

## 2018-09-29 NOTE — PROGRESS NOTE ADULT - SUBJECTIVE AND OBJECTIVE BOX
Vascular Surgery Progress Note:    Subjective:  continuing to adjust hep gtt based on aptt.  No acute events overnight.  Patient examined at bedside.  No new c/o.    Objective:  Exam:  Gen: NAD, A&Ox3  CV: No increased WOB  Pulses: PT palpable RLE    MEDICATIONS  (STANDING):  aspirin  chewable 81 milliGRAM(s) Oral daily  atorvastatin 40 milliGRAM(s) Oral at bedtime  famotidine    Tablet 40 milliGRAM(s) Oral two times a day  heparin  Infusion. 1700 Unit(s)/Hr (17 mL/Hr) IV Continuous <Continuous>  influenza   Vaccine 0.5 milliLiter(s) IntraMuscular once  metoprolol tartrate 25 milliGRAM(s) Oral two times a day  NIFEdipine XL 90 milliGRAM(s) Oral daily  pantoprazole    Tablet 40 milliGRAM(s) Oral before breakfast  polyethylene glycol 3350 17 Gram(s) Oral daily  warfarin 5 milliGRAM(s) Oral once    MEDICATIONS  (PRN):  acetaminophen   Tablet .. 325 milliGRAM(s) Oral every 4 hours PRN Mild Pain (1 - 3)  bisacodyl 5 milliGRAM(s) Oral at bedtime PRN Constipation  oxyCODONE    5 mG/acetaminophen 325 mG 1 Tablet(s) Oral every 4 hours PRN Moderate Pain (4 - 6)  oxyCODONE    5 mG/acetaminophen 325 mG 2 Tablet(s) Oral every 4 hours PRN Severe Pain (7 - 10)      Vital Signs Last 24 Hrs  T(C): 37.1 (29 Sep 2018 10:14), Max: 37.1 (29 Sep 2018 05:15)  T(F): 98.8 (29 Sep 2018 10:14), Max: 98.8 (29 Sep 2018 10:14)  HR: 80 (29 Sep 2018 10:14) (62 - 80)  BP: 109/65 (29 Sep 2018 10:14) (109/65 - 129/74)  BP(mean): --  RR: 18 (29 Sep 2018 10:14) (18 - 20)  SpO2: 98% (29 Sep 2018 10:14) (92% - 98%)    I&O's Detail    28 Sep 2018 07:01  -  29 Sep 2018 07:00  --------------------------------------------------------  IN:  Total IN: 0 mL    OUT:    Voided: 3405 mL  Total OUT: 3405 mL    Total NET: -3405 mL      29 Sep 2018 07:01  -  29 Sep 2018 10:59  --------------------------------------------------------  IN:  Total IN: 0 mL    OUT:    Voided: 700 mL  Total OUT: 700 mL    Total NET: -700 mL          LABS:                        9.0    14.35 )-----------( 163      ( 29 Sep 2018 04:48 )             28.3     09-29    138  |  99  |  9   ----------------------------<  130<H>  3.7   |  26  |  0.99    Ca    8.6      29 Sep 2018 04:48  Phos  3.3     09-29  Mg     2.2     09-29      PT/INR - ( 29 Sep 2018 04:48 )   PT: 12.7 SEC;   INR: 1.14          PTT - ( 29 Sep 2018 04:48 )  PTT:45.8 SEC

## 2018-09-30 LAB
APTT BLD: 62.6 SEC — HIGH (ref 27.5–37.4)
BUN SERPL-MCNC: 7 MG/DL — SIGNIFICANT CHANGE UP (ref 7–23)
CALCIUM SERPL-MCNC: 8.7 MG/DL — SIGNIFICANT CHANGE UP (ref 8.4–10.5)
CHLORIDE SERPL-SCNC: 99 MMOL/L — SIGNIFICANT CHANGE UP (ref 98–107)
CO2 SERPL-SCNC: 26 MMOL/L — SIGNIFICANT CHANGE UP (ref 22–31)
CREAT SERPL-MCNC: 0.99 MG/DL — SIGNIFICANT CHANGE UP (ref 0.5–1.3)
GLUCOSE SERPL-MCNC: 123 MG/DL — HIGH (ref 70–99)
HCT VFR BLD CALC: 28.5 % — LOW (ref 39–50)
HGB BLD-MCNC: 9.1 G/DL — LOW (ref 13–17)
INR BLD: 1.22 — HIGH (ref 0.88–1.17)
MAGNESIUM SERPL-MCNC: 2 MG/DL — SIGNIFICANT CHANGE UP (ref 1.6–2.6)
MCHC RBC-ENTMCNC: 28.9 PG — SIGNIFICANT CHANGE UP (ref 27–34)
MCHC RBC-ENTMCNC: 31.9 % — LOW (ref 32–36)
MCV RBC AUTO: 90.5 FL — SIGNIFICANT CHANGE UP (ref 80–100)
NRBC # FLD: 0 — SIGNIFICANT CHANGE UP
PHOSPHATE SERPL-MCNC: 3.1 MG/DL — SIGNIFICANT CHANGE UP (ref 2.5–4.5)
PLATELET # BLD AUTO: 194 K/UL — SIGNIFICANT CHANGE UP (ref 150–400)
PMV BLD: 10.5 FL — SIGNIFICANT CHANGE UP (ref 7–13)
POTASSIUM SERPL-MCNC: 3.4 MMOL/L — LOW (ref 3.5–5.3)
POTASSIUM SERPL-SCNC: 3.4 MMOL/L — LOW (ref 3.5–5.3)
PROTHROM AB SERPL-ACNC: 14.1 SEC — HIGH (ref 9.8–13.1)
RBC # BLD: 3.15 M/UL — LOW (ref 4.2–5.8)
RBC # FLD: 14 % — SIGNIFICANT CHANGE UP (ref 10.3–14.5)
SODIUM SERPL-SCNC: 137 MMOL/L — SIGNIFICANT CHANGE UP (ref 135–145)
WBC # BLD: 11.97 K/UL — HIGH (ref 3.8–10.5)
WBC # FLD AUTO: 11.97 K/UL — HIGH (ref 3.8–10.5)

## 2018-09-30 RX ORDER — WARFARIN SODIUM 2.5 MG/1
5 TABLET ORAL ONCE
Qty: 0 | Refills: 0 | Status: COMPLETED | OUTPATIENT
Start: 2018-09-30 | End: 2018-09-30

## 2018-09-30 RX ADMIN — FAMOTIDINE 40 MILLIGRAM(S): 10 INJECTION INTRAVENOUS at 18:41

## 2018-09-30 RX ADMIN — Medication 25 MILLIGRAM(S): at 18:41

## 2018-09-30 RX ADMIN — PANTOPRAZOLE SODIUM 40 MILLIGRAM(S): 20 TABLET, DELAYED RELEASE ORAL at 07:08

## 2018-09-30 RX ADMIN — Medication 81 MILLIGRAM(S): at 18:41

## 2018-09-30 RX ADMIN — Medication 25 MILLIGRAM(S): at 07:07

## 2018-09-30 RX ADMIN — POLYETHYLENE GLYCOL 3350 17 GRAM(S): 17 POWDER, FOR SOLUTION ORAL at 18:41

## 2018-09-30 RX ADMIN — Medication 90 MILLIGRAM(S): at 07:08

## 2018-09-30 RX ADMIN — ATORVASTATIN CALCIUM 40 MILLIGRAM(S): 80 TABLET, FILM COATED ORAL at 22:24

## 2018-09-30 RX ADMIN — FAMOTIDINE 40 MILLIGRAM(S): 10 INJECTION INTRAVENOUS at 07:08

## 2018-09-30 RX ADMIN — WARFARIN SODIUM 5 MILLIGRAM(S): 2.5 TABLET ORAL at 18:41

## 2018-09-30 NOTE — PROVIDER CONTACT NOTE (OTHER) - ACTION/TREATMENT ORDERED:
MD will assess patient at bedside and review tele strips during AM rounds. will continue to monitor
per MD- "patient does not need to be on monitor until transferred to tele bed".
MD aware- will come assess patient, yes EKG

## 2018-09-30 NOTE — PROGRESS NOTE ADULT - SUBJECTIVE AND OBJECTIVE BOX
Vascular Surgery Progress Note    Subjective:   Pt seen & examined at bedside. Pain is currently well controlled. No complaints. No F/C, N/V, CP/SOB.  3.25 second pause on tele overnight, patient denies any symptoms.      Medications:  aspirin  chewable 81  heparin  Infusion. 1700  metoprolol tartrate 25  NIFEdipine XL 90      Vital Signs Last 24 Hrs  T(C): 37.3 (30 Sep 2018 07:01), Max: 37.6 (29 Sep 2018 21:41)  T(F): 99.2 (30 Sep 2018 07:01), Max: 99.6 (29 Sep 2018 21:41)  HR: 72 (30 Sep 2018 07:01) (72 - 89)  BP: 121/70 (30 Sep 2018 07:01) (117/72 - 123/73)  BP(mean): --  RR: 18 (30 Sep 2018 07:01) (18 - 18)  SpO2: 93% (30 Sep 2018 07:01) (90% - 98%)    I&O's Summary    29 Sep 2018 07:01  -  30 Sep 2018 07:00  --------------------------------------------------------  IN: 600 mL / OUT: 3850 mL / NET: -3250 mL      Physical Exam:  Gen: NAD, A&Ox3  CV: No increased WOB  Pulses: radial pulses 2+ bilaterally.  PT palpable RLE    LABS:                        9.1    11.97 )-----------( 194      ( 30 Sep 2018 05:52 )             28.5     09-30    137  |  99  |  7   ----------------------------<  123<H>  3.4<L>   |  26  |  0.99    Ca    8.7      30 Sep 2018 05:52  Phos  3.1     09-30  Mg     2.0     09-30      PT/INR - ( 30 Sep 2018 05:52 )   PT: 14.1 SEC;   INR: 1.22          PTT - ( 30 Sep 2018 02:04 )  PTT:62.6 SEC    < from: Transthoracic Echocardiogram (09.28.18 @ 15:33) >  CONCLUSIONS:  1. Calcified trileaflet aortic valve with normal opening.  2. Endocardium not well visualized; grossly normal left  ventricular systolic function.  3. The right ventricle is not well visualized.    < end of copied text > Vascular Surgery Progress Note    Subjective:   Pt seen & examined at bedside. Pain is currently well controlled. No complaints. No F/C, N/V, CP/SOB.  3.25 second pause on tele overnight, patient denies any symptoms.      Medications:  aspirin  chewable 81  heparin  Infusion. 1700  metoprolol tartrate 25  NIFEdipine XL 90      Vital Signs Last 24 Hrs  T(C): 37.3 (30 Sep 2018 07:01), Max: 37.6 (29 Sep 2018 21:41)  T(F): 99.2 (30 Sep 2018 07:01), Max: 99.6 (29 Sep 2018 21:41)  HR: 72 (30 Sep 2018 07:01) (72 - 89)  BP: 121/70 (30 Sep 2018 07:01) (117/72 - 123/73)  BP(mean): --  RR: 18 (30 Sep 2018 07:01) (18 - 18)  SpO2: 93% (30 Sep 2018 07:01) (90% - 98%)    I&O's Summary    29 Sep 2018 07:01  -  30 Sep 2018 07:00  --------------------------------------------------------  IN: 600 mL / OUT: 3850 mL / NET: -3250 mL      Physical Exam:  Gen: NAD, A&Ox3  CV: No increased WOB  Ext: moderate edema RLE  Pulses: radial pulses 2+ bilaterally.  PT palpable RLE    LABS:                        9.1    11.97 )-----------( 194      ( 30 Sep 2018 05:52 )             28.5     09-30    137  |  99  |  7   ----------------------------<  123<H>  3.4<L>   |  26  |  0.99    Ca    8.7      30 Sep 2018 05:52  Phos  3.1     09-30  Mg     2.0     09-30      PT/INR - ( 30 Sep 2018 05:52 )   PT: 14.1 SEC;   INR: 1.22          PTT - ( 30 Sep 2018 02:04 )  PTT:62.6 SEC    < from: Transthoracic Echocardiogram (09.28.18 @ 15:33) >  CONCLUSIONS:  1. Calcified trileaflet aortic valve with normal opening.  2. Endocardium not well visualized; grossly normal left  ventricular systolic function.  3. The right ventricle is not well visualized.    < end of copied text >

## 2018-09-30 NOTE — PROGRESS NOTE ADULT - ASSESSMENT
73 y/o M s/p endovascular abdominal aneurysm repair in 12/2017 who presented with 6 months of calf pain with ambulation, now s/p Right lower extremity fem-PT bypass with PTFE on 9/26.    Plan:  - 3.25s pause on tele - appreciate cardiology recs  - hep gtt to keep graft patent. Will adjust rate PRN per aptt.  Coumadin nightly, will f/u INR daily.  - when INR is therapeutic (2-3), may discontinue the hep gtt  - PT/OOB/ambulate as tolerated.  - C/w home meds.  - ASA daily.  - Pain control. 73 y/o M s/p endovascular abdominal aneurysm repair in 12/2017 who presented with 6 months of calf pain with ambulation, now s/p Right lower extremity fem-PT bypass with PTFE on 9/26.    Plan:  - 3.25s pause on tele - appreciate cardiology recs  - hep gtt to keep graft patent. Will adjust rate PRN per aptt.  Coumadin nightly, will f/u INR daily.  - when INR is therapeutic (2-3), may discontinue the hep gtt  - PT/OOB/ambulate as tolerated.  - C/w home meds.  - ASA daily.  - Pain control.  - Remove Provena vac tomorrow morning

## 2018-10-01 LAB
APTT BLD: 59.6 SEC — HIGH (ref 27.5–37.4)
BUN SERPL-MCNC: 6 MG/DL — LOW (ref 7–23)
CALCIUM SERPL-MCNC: 8.3 MG/DL — LOW (ref 8.4–10.5)
CHLORIDE SERPL-SCNC: 101 MMOL/L — SIGNIFICANT CHANGE UP (ref 98–107)
CO2 SERPL-SCNC: 25 MMOL/L — SIGNIFICANT CHANGE UP (ref 22–31)
CREAT SERPL-MCNC: 1.04 MG/DL — SIGNIFICANT CHANGE UP (ref 0.5–1.3)
GLUCOSE SERPL-MCNC: 124 MG/DL — HIGH (ref 70–99)
HCT VFR BLD CALC: 27.2 % — LOW (ref 39–50)
HGB BLD-MCNC: 8.8 G/DL — LOW (ref 13–17)
INR BLD: 1.34 — HIGH (ref 0.88–1.17)
MAGNESIUM SERPL-MCNC: 2.1 MG/DL — SIGNIFICANT CHANGE UP (ref 1.6–2.6)
MCHC RBC-ENTMCNC: 28.5 PG — SIGNIFICANT CHANGE UP (ref 27–34)
MCHC RBC-ENTMCNC: 32.4 % — SIGNIFICANT CHANGE UP (ref 32–36)
MCV RBC AUTO: 88 FL — SIGNIFICANT CHANGE UP (ref 80–100)
NRBC # FLD: 0 — SIGNIFICANT CHANGE UP
PHOSPHATE SERPL-MCNC: 3.8 MG/DL — SIGNIFICANT CHANGE UP (ref 2.5–4.5)
PLATELET # BLD AUTO: 210 K/UL — SIGNIFICANT CHANGE UP (ref 150–400)
PMV BLD: 10.5 FL — SIGNIFICANT CHANGE UP (ref 7–13)
POTASSIUM SERPL-MCNC: 3.5 MMOL/L — SIGNIFICANT CHANGE UP (ref 3.5–5.3)
POTASSIUM SERPL-SCNC: 3.5 MMOL/L — SIGNIFICANT CHANGE UP (ref 3.5–5.3)
PROTHROM AB SERPL-ACNC: 15 SEC — HIGH (ref 9.8–13.1)
RBC # BLD: 3.09 M/UL — LOW (ref 4.2–5.8)
RBC # FLD: 13.9 % — SIGNIFICANT CHANGE UP (ref 10.3–14.5)
SODIUM SERPL-SCNC: 141 MMOL/L — SIGNIFICANT CHANGE UP (ref 135–145)
WBC # BLD: 10.94 K/UL — HIGH (ref 3.8–10.5)
WBC # FLD AUTO: 10.94 K/UL — HIGH (ref 3.8–10.5)

## 2018-10-01 RX ORDER — POTASSIUM CHLORIDE 20 MEQ
40 PACKET (EA) ORAL ONCE
Qty: 0 | Refills: 0 | Status: COMPLETED | OUTPATIENT
Start: 2018-10-01 | End: 2018-10-01

## 2018-10-01 RX ORDER — WARFARIN SODIUM 2.5 MG/1
5 TABLET ORAL ONCE
Qty: 0 | Refills: 0 | Status: COMPLETED | OUTPATIENT
Start: 2018-10-01 | End: 2018-10-01

## 2018-10-01 RX ORDER — ENOXAPARIN SODIUM 100 MG/ML
100 INJECTION SUBCUTANEOUS
Qty: 0 | Refills: 0 | Status: DISCONTINUED | OUTPATIENT
Start: 2018-10-01 | End: 2018-10-03

## 2018-10-01 RX ADMIN — FAMOTIDINE 40 MILLIGRAM(S): 10 INJECTION INTRAVENOUS at 19:11

## 2018-10-01 RX ADMIN — PANTOPRAZOLE SODIUM 40 MILLIGRAM(S): 20 TABLET, DELAYED RELEASE ORAL at 06:16

## 2018-10-01 RX ADMIN — ATORVASTATIN CALCIUM 40 MILLIGRAM(S): 80 TABLET, FILM COATED ORAL at 23:08

## 2018-10-01 RX ADMIN — Medication 25 MILLIGRAM(S): at 19:12

## 2018-10-01 RX ADMIN — FAMOTIDINE 40 MILLIGRAM(S): 10 INJECTION INTRAVENOUS at 06:16

## 2018-10-01 RX ADMIN — POLYETHYLENE GLYCOL 3350 17 GRAM(S): 17 POWDER, FOR SOLUTION ORAL at 19:11

## 2018-10-01 RX ADMIN — ENOXAPARIN SODIUM 100 MILLIGRAM(S): 100 INJECTION SUBCUTANEOUS at 23:36

## 2018-10-01 RX ADMIN — WARFARIN SODIUM 5 MILLIGRAM(S): 2.5 TABLET ORAL at 19:11

## 2018-10-01 RX ADMIN — Medication 81 MILLIGRAM(S): at 19:12

## 2018-10-01 RX ADMIN — Medication 25 MILLIGRAM(S): at 06:16

## 2018-10-01 RX ADMIN — HEPARIN SODIUM 1700 UNIT(S)/HR: 5000 INJECTION INTRAVENOUS; SUBCUTANEOUS at 08:28

## 2018-10-01 RX ADMIN — Medication 90 MILLIGRAM(S): at 06:16

## 2018-10-01 RX ADMIN — Medication 40 MILLIEQUIVALENT(S): at 19:12

## 2018-10-01 NOTE — PROGRESS NOTE ADULT - SUBJECTIVE AND OBJECTIVE BOX
Vascular Surgery Progress Note:    Subjective:  No acute events overnight.  Patient examined at bedside.  No new c/o.    Objective:  Exam:  Gen: NAD, A&Ox3  CV: No increased WOB  Ext: moderate edema RLE  Pulses: radial pulses 2+ bilaterally.  PT palpable RLE    MEDICATIONS  (STANDING):  aspirin  chewable 81 milliGRAM(s) Oral daily  atorvastatin 40 milliGRAM(s) Oral at bedtime  famotidine    Tablet 40 milliGRAM(s) Oral two times a day  heparin  Infusion. 1700 Unit(s)/Hr (17 mL/Hr) IV Continuous <Continuous>  influenza   Vaccine 0.5 milliLiter(s) IntraMuscular once  metoprolol tartrate 25 milliGRAM(s) Oral two times a day  NIFEdipine XL 90 milliGRAM(s) Oral daily  pantoprazole    Tablet 40 milliGRAM(s) Oral before breakfast  polyethylene glycol 3350 17 Gram(s) Oral daily  potassium chloride    Tablet ER 40 milliEquivalent(s) Oral once    MEDICATIONS  (PRN):  acetaminophen   Tablet .. 325 milliGRAM(s) Oral every 4 hours PRN Mild Pain (1 - 3)  bisacodyl 5 milliGRAM(s) Oral at bedtime PRN Constipation  oxyCODONE    5 mG/acetaminophen 325 mG 1 Tablet(s) Oral every 4 hours PRN Moderate Pain (4 - 6)  oxyCODONE    5 mG/acetaminophen 325 mG 2 Tablet(s) Oral every 4 hours PRN Severe Pain (7 - 10)      Vital Signs Last 24 Hrs  T(C): 37.2 (01 Oct 2018 11:02), Max: 37.5 (30 Sep 2018 21:45)  T(F): 99 (01 Oct 2018 11:02), Max: 99.5 (30 Sep 2018 21:45)  HR: 51 (01 Oct 2018 11:02) (51 - 75)  BP: 107/60 (01 Oct 2018 11:02) (104/61 - 124/72)  BP(mean): --  RR: 18 (01 Oct 2018 11:02) (16 - 18)  SpO2: 98% (01 Oct 2018 11:02) (93% - 98%)    I&O's Detail    30 Sep 2018 07:01  -  01 Oct 2018 07:00  --------------------------------------------------------  IN:    Oral Fluid: 600 mL  Total IN: 600 mL    OUT:    Voided: 2845 mL  Total OUT: 2845 mL    Total NET: -2245 mL      01 Oct 2018 07:01  -  01 Oct 2018 15:51  --------------------------------------------------------  IN:  Total IN: 0 mL    OUT:    Voided: 1200 mL  Total OUT: 1200 mL    Total NET: -1200 mL          LABS:                        8.8    10.94 )-----------( 210      ( 01 Oct 2018 06:42 )             27.2     10-01    141  |  101  |  6<L>  ----------------------------<  124<H>  3.5   |  25  |  1.04    Ca    8.3<L>      01 Oct 2018 06:42  Phos  3.8     10-01  Mg     2.1     10-01      PT/INR - ( 01 Oct 2018 06:42 )   PT: 15.0 SEC;   INR: 1.34          PTT - ( 01 Oct 2018 06:42 )  PTT:59.6 SEC

## 2018-10-01 NOTE — PROGRESS NOTE ADULT - ASSESSMENT
71 y/o M s/p endovascular abdominal aneurysm repair in 12/2017 who presented with 6 months of calf pain with ambulation, now s/p Right lower extremity fem-PT bypass with PTFE on 9/26.    Plan:  - hep gtt to keep graft patent. Will adjust rate PRN per aptt.  Coumadin nightly, will f/u INR daily.  - when INR is therapeutic (2-3), may discontinue the hep gtt.  - PT/OOB/ambulate as tolerated.  - C/w home meds.  - ASA daily.  - Pain control.  - Remove Provena vac today.    -Dispo planning. 71 y/o M s/p endovascular abdominal aneurysm repair in 12/2017, now s/p Right lower extremity fem-PT bypass with PTFE, with exclusion, for popliteal artery aneurysm, performed on 9/26.    Plan:  - hep gtt to keep graft patent. Will adjust rate PRN per aptt.  Coumadin nightly, will f/u INR daily.  - when INR is therapeutic (2-3), may discontinue the hep gtt.  - PT/OOB/ambulate as tolerated.  - C/w home meds.  - ASA daily.  - Pain control.  - Remove Provena vac today.    -Dispo planning.

## 2018-10-02 ENCOUNTER — TRANSCRIPTION ENCOUNTER (OUTPATIENT)
Age: 73
End: 2018-10-02

## 2018-10-02 LAB
APTT BLD: 36.8 SEC — SIGNIFICANT CHANGE UP (ref 27.5–37.4)
BUN SERPL-MCNC: 7 MG/DL — SIGNIFICANT CHANGE UP (ref 7–23)
CALCIUM SERPL-MCNC: 8.7 MG/DL — SIGNIFICANT CHANGE UP (ref 8.4–10.5)
CHLORIDE SERPL-SCNC: 100 MMOL/L — SIGNIFICANT CHANGE UP (ref 98–107)
CO2 SERPL-SCNC: 22 MMOL/L — SIGNIFICANT CHANGE UP (ref 22–31)
CREAT SERPL-MCNC: 1.06 MG/DL — SIGNIFICANT CHANGE UP (ref 0.5–1.3)
GLUCOSE SERPL-MCNC: 136 MG/DL — HIGH (ref 70–99)
HCT VFR BLD CALC: 29.3 % — LOW (ref 39–50)
HGB BLD-MCNC: 9.3 G/DL — LOW (ref 13–17)
INR BLD: 1.42 — HIGH (ref 0.88–1.17)
MAGNESIUM SERPL-MCNC: 2.1 MG/DL — SIGNIFICANT CHANGE UP (ref 1.6–2.6)
MCHC RBC-ENTMCNC: 28.2 PG — SIGNIFICANT CHANGE UP (ref 27–34)
MCHC RBC-ENTMCNC: 31.7 % — LOW (ref 32–36)
MCV RBC AUTO: 88.8 FL — SIGNIFICANT CHANGE UP (ref 80–100)
NRBC # FLD: 0 — SIGNIFICANT CHANGE UP
PHOSPHATE SERPL-MCNC: 4 MG/DL — SIGNIFICANT CHANGE UP (ref 2.5–4.5)
PLATELET # BLD AUTO: 251 K/UL — SIGNIFICANT CHANGE UP (ref 150–400)
PMV BLD: 10.1 FL — SIGNIFICANT CHANGE UP (ref 7–13)
POTASSIUM SERPL-MCNC: 3.9 MMOL/L — SIGNIFICANT CHANGE UP (ref 3.5–5.3)
POTASSIUM SERPL-SCNC: 3.9 MMOL/L — SIGNIFICANT CHANGE UP (ref 3.5–5.3)
PROTHROM AB SERPL-ACNC: 16.4 SEC — HIGH (ref 9.8–13.1)
RBC # BLD: 3.3 M/UL — LOW (ref 4.2–5.8)
RBC # FLD: 13.8 % — SIGNIFICANT CHANGE UP (ref 10.3–14.5)
SODIUM SERPL-SCNC: 138 MMOL/L — SIGNIFICANT CHANGE UP (ref 135–145)
WBC # BLD: 12.83 K/UL — HIGH (ref 3.8–10.5)
WBC # FLD AUTO: 12.83 K/UL — HIGH (ref 3.8–10.5)

## 2018-10-02 RX ORDER — ASPIRIN/CALCIUM CARB/MAGNESIUM 324 MG
1 TABLET ORAL
Qty: 0 | Refills: 0 | DISCHARGE
Start: 2018-10-02

## 2018-10-02 RX ORDER — WARFARIN SODIUM 2.5 MG/1
5 TABLET ORAL ONCE
Qty: 0 | Refills: 0 | Status: COMPLETED | OUTPATIENT
Start: 2018-10-02 | End: 2018-10-02

## 2018-10-02 RX ORDER — METOPROLOL TARTRATE 50 MG
1 TABLET ORAL
Qty: 0 | Refills: 0 | DISCHARGE
Start: 2018-10-02

## 2018-10-02 RX ORDER — WARFARIN SODIUM 2.5 MG/1
1 TABLET ORAL
Qty: 30 | Refills: 0
Start: 2018-10-02 | End: 2018-10-31

## 2018-10-02 RX ORDER — POLYETHYLENE GLYCOL 3350 17 G/17G
17 POWDER, FOR SOLUTION ORAL
Qty: 0 | Refills: 0 | DISCHARGE
Start: 2018-10-02

## 2018-10-02 RX ORDER — ATORVASTATIN CALCIUM 80 MG/1
1 TABLET, FILM COATED ORAL
Qty: 0 | Refills: 0 | DISCHARGE
Start: 2018-10-02

## 2018-10-02 RX ORDER — ENOXAPARIN SODIUM 100 MG/ML
100 INJECTION SUBCUTANEOUS
Qty: 1400 | Refills: 0
Start: 2018-10-02

## 2018-10-02 RX ADMIN — PANTOPRAZOLE SODIUM 40 MILLIGRAM(S): 20 TABLET, DELAYED RELEASE ORAL at 07:22

## 2018-10-02 RX ADMIN — OXYCODONE AND ACETAMINOPHEN 1 TABLET(S): 5; 325 TABLET ORAL at 21:42

## 2018-10-02 RX ADMIN — Medication 81 MILLIGRAM(S): at 18:53

## 2018-10-02 RX ADMIN — ENOXAPARIN SODIUM 100 MILLIGRAM(S): 100 INJECTION SUBCUTANEOUS at 10:16

## 2018-10-02 RX ADMIN — FAMOTIDINE 40 MILLIGRAM(S): 10 INJECTION INTRAVENOUS at 18:52

## 2018-10-02 RX ADMIN — Medication 90 MILLIGRAM(S): at 07:22

## 2018-10-02 RX ADMIN — ATORVASTATIN CALCIUM 40 MILLIGRAM(S): 80 TABLET, FILM COATED ORAL at 21:42

## 2018-10-02 RX ADMIN — OXYCODONE AND ACETAMINOPHEN 1 TABLET(S): 5; 325 TABLET ORAL at 22:30

## 2018-10-02 RX ADMIN — ENOXAPARIN SODIUM 100 MILLIGRAM(S): 100 INJECTION SUBCUTANEOUS at 21:42

## 2018-10-02 RX ADMIN — WARFARIN SODIUM 5 MILLIGRAM(S): 2.5 TABLET ORAL at 18:52

## 2018-10-02 RX ADMIN — Medication 25 MILLIGRAM(S): at 18:52

## 2018-10-02 RX ADMIN — FAMOTIDINE 40 MILLIGRAM(S): 10 INJECTION INTRAVENOUS at 07:22

## 2018-10-02 RX ADMIN — Medication 25 MILLIGRAM(S): at 07:22

## 2018-10-02 RX ADMIN — POLYETHYLENE GLYCOL 3350 17 GRAM(S): 17 POWDER, FOR SOLUTION ORAL at 18:53

## 2018-10-02 NOTE — DISCHARGE NOTE ADULT - CONDITIONS AT DISCHARGE
AOx4, discharged to home with home care.  No s/s of distress noted.  Denies chest pain, SOB, or discomfort.  Respirations even and nonlabored.  Tolerating diet with no signs of GI distress.  OOB with walker.  Leaving by wheelchair with wife.  IV removed.  Patient and spouse discharge education provided.

## 2018-10-02 NOTE — DISCHARGE NOTE ADULT - MEDICATION SUMMARY - MEDICATIONS TO TAKE
I will START or STAY ON the medications listed below when I get home from the hospital:    oxyCODONE-acetaminophen 5 mg-325 mg oral tablet  -- 1 tab(s) by mouth every 4 hours, As needed, Moderate Pain (4 - 6)  -- Indication: For pain prn     oxyCODONE-acetaminophen 5 mg-325 mg oral tablet  -- 2 tab(s) by mouth every 4 hours, As needed, Severe Pain (7 - 10)  -- Indication: For pain prn     aspirin 81 mg oral tablet, chewable  -- 1 tab(s) by mouth once a day  -- Indication: For home med     Cardizem  mg/24 hours oral capsule, extended release  -- 1 cap(s) by mouth once a day  -- Indication: For home med     warfarin 5 mg oral tablet  -- 1 tab(s) by mouth once a day MDD:1  -- Do not take this drug if you are pregnant.  It is very important that you take or use this exactly as directed.  Do not skip doses or discontinue unless directed by your doctor.  Obtain medical advice before taking any non-prescription drugs as some may affect the action of this medication.    -- Indication: For endovascular graft     enoxaparin 100 mg/mL injectable solution  -- 100 milligram(s) subcutaneously 2 times a day MDD:200  -- It is very important that you take or use this exactly as directed.  Do not skip doses or discontinue unless directed by your doctor.    -- Indication: For endovascular graft     atorvastatin 40 mg oral tablet  -- 1 tab(s) by mouth once a day (at bedtime)  -- Indication: For home med     metoprolol tartrate 25 mg oral tablet  -- 1 tab(s) by mouth 2 times a day  -- Indication: For home med     NIFEdipine 90 mg oral tablet, extended release  -- 1 tab(s) by mouth once a day  -- Indication: For home med     famotidine 40 mg oral tablet  -- 1 tab(s) by mouth 2 times a day  -- Indication: For home med     bisacodyl 5 mg oral delayed release tablet  -- 1 tab(s) by mouth once a day (at bedtime), As needed, Constipation  -- Indication: For home med     polyethylene glycol 3350 oral powder for reconstitution  -- 17 gram(s) by mouth once a day  -- Indication: For home med     pantoprazole 40 mg oral delayed release tablet  -- 1  by mouth 2 times a day before meals  -- Indication: For home med I will START or STAY ON the medications listed below when I get home from the hospital:    Rolling Walker   -- application between cheek and gums once a day   -- Indication: For device     oxyCODONE-acetaminophen 5 mg-325 mg oral tablet  -- 1 tab(s) by mouth every 6 hours, As Needed -Moderate Pain (4 - 6) MDD:4  -- Indication: For PAIN    aspirin 81 mg oral tablet, chewable  -- 1 tab(s) by mouth once a day  -- Indication: For CAD    Cardizem  mg/24 hours oral capsule, extended release  -- 1 cap(s) by mouth once a day  -- Indication: For Afib    enoxaparin 100 mg/mL injectable solution  -- 100 milligram(s) subcutaneously 2 times a day MDD:200  -- It is very important that you take or use this exactly as directed.  Do not skip doses or discontinue unless directed by your doctor.    -- Indication: For Anticoagulation    warfarin 5 mg oral tablet  -- 1 tab(s) by mouth once a day MDD:1  -- Do not take this drug if you are pregnant.  It is very important that you take or use this exactly as directed.  Do not skip doses or discontinue unless directed by your doctor.  Obtain medical advice before taking any non-prescription drugs as some may affect the action of this medication.    -- Indication: For Anticoagulation    atorvastatin 40 mg oral tablet  -- 1 tab(s) by mouth once a day (at bedtime)  -- Indication: For HLD    metoprolol tartrate 25 mg oral tablet  -- 1 tab(s) by mouth 2 times a day  -- Indication: For tachycardia    NIFEdipine 90 mg oral tablet, extended release  -- 1 tab(s) by mouth once a day  -- Indication: For tachycardia     famotidine 40 mg oral tablet  -- 1 tab(s) by mouth 2 times a day  -- Indication: For GI    bisacodyl 5 mg oral delayed release tablet  -- 1 tab(s) by mouth once a day (at bedtime), As needed, Constipation  -- Indication: For constipation    polyethylene glycol 3350 oral powder for reconstitution  -- 17 gram(s) by mouth once a day  -- Indication: For constipation    pantoprazole 40 mg oral delayed release tablet  -- 1  by mouth 2 times a day before meals  -- Indication: For GI

## 2018-10-02 NOTE — PROGRESS NOTE ADULT - SUBJECTIVE AND OBJECTIVE BOX
Vascular Surgery Progress Note:    Subjective:  No acute events overnight.  Patient examined at bedside.  No new c/o.    Objective:  Exam:  Gen: NAD, A&Ox3  CV: No increased WOB  Ext: moderate edema RLE  Pulses: radial pulses 2+ bilaterally.  PT palpable RLE    MEDICATIONS  (STANDING):  aspirin  chewable 81 milliGRAM(s) Oral daily  atorvastatin 40 milliGRAM(s) Oral at bedtime  enoxaparin Injectable 100 milliGRAM(s) SubCutaneous two times a day  famotidine    Tablet 40 milliGRAM(s) Oral two times a day  influenza   Vaccine 0.5 milliLiter(s) IntraMuscular once  metoprolol tartrate 25 milliGRAM(s) Oral two times a day  NIFEdipine XL 90 milliGRAM(s) Oral daily  pantoprazole    Tablet 40 milliGRAM(s) Oral before breakfast  polyethylene glycol 3350 17 Gram(s) Oral daily  warfarin 5 milliGRAM(s) Oral once    MEDICATIONS  (PRN):  acetaminophen   Tablet .. 325 milliGRAM(s) Oral every 4 hours PRN Mild Pain (1 - 3)  bisacodyl 5 milliGRAM(s) Oral at bedtime PRN Constipation  oxyCODONE    5 mG/acetaminophen 325 mG 1 Tablet(s) Oral every 4 hours PRN Moderate Pain (4 - 6)  oxyCODONE    5 mG/acetaminophen 325 mG 2 Tablet(s) Oral every 4 hours PRN Severe Pain (7 - 10)      Vital Signs Last 24 Hrs  T(C): 36.8 (02 Oct 2018 10:57), Max: 37.1 (01 Oct 2018 16:49)  T(F): 98.3 (02 Oct 2018 10:57), Max: 98.7 (01 Oct 2018 16:49)  HR: 81 (02 Oct 2018 10:57) (71 - 91)  BP: 114/65 (02 Oct 2018 10:57) (101/66 - 126/79)  BP(mean): --  RR: 18 (02 Oct 2018 10:57) (18 - 18)  SpO2: 99% (02 Oct 2018 10:57) (93% - 99%)    I&O's Detail    01 Oct 2018 07:01  -  02 Oct 2018 07:00  --------------------------------------------------------  IN:  Total IN: 0 mL    OUT:    Voided: 2275 mL  Total OUT: 2275 mL    Total NET: -2275 mL      02 Oct 2018 07:01  -  02 Oct 2018 12:19  --------------------------------------------------------  IN:    Oral Fluid: 236 mL  Total IN: 236 mL    OUT:    Voided: 225 mL  Total OUT: 225 mL    Total NET: 11 mL          LABS:                        9.3    12.83 )-----------( 251      ( 02 Oct 2018 05:40 )             29.3     10-02    138  |  100  |  7   ----------------------------<  136<H>  3.9   |  22  |  1.06    Ca    8.7      02 Oct 2018 05:40  Phos  4.0     10-02  Mg     2.1     10-02      PT/INR - ( 02 Oct 2018 05:40 )   PT: 16.4 SEC;   INR: 1.42          PTT - ( 02 Oct 2018 05:40 )  PTT:36.8 SEC

## 2018-10-02 NOTE — DISCHARGE NOTE ADULT - NSFTFAOTHERFT_GEN_ALL_CORE
the beneficiary has mobility limitations that impair ability to perform one or more mobile activities of daily living while home

## 2018-10-02 NOTE — PROGRESS NOTE ADULT - ASSESSMENT
73 y/o M s/p endovascular abdominal aneurysm repair in 12/2017, now s/p Right lower extremity fem-PT bypass with PTFE, with exclusion, for popliteal artery aneurysm, performed on 9/26.    Plan:  - Coumadin nightly, will f/u INR daily. Therapeutic lovenox, will f/u insurance affordability.  - PT/OOB/ambulate as tolerated.  - C/w home meds.  - ASA daily.  - Pain control.    -Dispo planning. Potentially d/c today.

## 2018-10-02 NOTE — DISCHARGE NOTE ADULT - INSTRUCTIONS
regular diet as tolerated Keep clean and dry, let water run off in shower, do not soak.  If discharge/drainage, pain, or swelling at site, contact medical doctor or go to nearest ED.

## 2018-10-02 NOTE — DISCHARGE NOTE ADULT - ADDITIONAL INSTRUCTIONS
Call to make an appointment with Dr. Crocker with in 1 week from discharge   Please follow up with your primary care physician regarding your hospitalization  regarding Coumadin INR level checks Call to make an appointment with Dr. Crocker with in 1 week from discharge   Please follow up with your primary care physician regarding your hospitalization.  Please follow up with Dr Seaamn on Thursday 10/4 at 1 pm. If you need to change or reschedule your appointment please call office at [phone]

## 2018-10-02 NOTE — DISCHARGE NOTE ADULT - PLAN OF CARE
s/p Right lower extremity fem-PT bypass with PTFE Lovenox to Coumadin bridge. Plan for d/c home with a rolling walker and home Physical therapy. WOUND CARE:  please keep incision clean and dry, pat dry when wet, staples to be removed at outpatient follow up appointment   BATHING: Please do not submerge wound underwater. You may shower and/or sponge bathe.  ACTIVITY: No heavy lifting or straining. Otherwise, you may return to your usual level of physical activity. If you are taking narcotic pain medication (such as Percocet) DO NOT drive a car, operate machinery or make important decisions.  DIET: Return to your usual diet.  NOTIFY YOUR SURGEON IF: You have any bleeding that does not stop, any pus draining from your wound(s), any fever (over 100.4 F) or chills, persistent nausea/vomiting, persistent diarrhea, or if your pain is not controlled on your discharge pain medications.  FOLLOW-UP: Please follow up with your primary care physician in one week regarding your hospitalization  Please follow up with Dr Crocker as an outpatient, please call to schedule appointment   You are being discharged on a Lovenox to Coumadin bridge. Please follow up with Dr Seaman as an outpatient to have your INR level monitored, appointment scheduled for....  Plan for d/c home with a rolling walker and home Physical therapy. WOUND CARE:  please keep incision clean and dry, pat dry when wet, staples to be removed at outpatient follow up appointment     BATHING: Please do not submerge wound underwater. You may shower and/or sponge bathe.    ACTIVITY: No heavy lifting or straining. Otherwise, you may return to your usual level of physical activity. If you are taking narcotic pain medication (such as Percocet) DO NOT drive a car, operate machinery or make important decisions.    DIET: Return to your usual diet.    NOTIFY YOUR SURGEON IF: You have any bleeding that does not stop, any pus draining from your wound(s), any fever (over 100.4 F) or chills, persistent nausea/vomiting, persistent diarrhea, or if your pain is not controlled on your discharge pain medications.  FOLLOW-UP: Please follow up with your primary care physician in one week regarding your hospitalization  Please follow up with Dr Crocker as an outpatient, please call to schedule appointment in 1 week.     You are being discharged on a Lovenox to Coumadin bridge. Please follow up with Dr Seaman on Thursday 10/4 at 1 pm. If you need to change or reschedule your appointment please call office at [phone]

## 2018-10-02 NOTE — DISCHARGE NOTE ADULT - CARE PROVIDERS DIRECT ADDRESSES
[email or web address]

## 2018-10-02 NOTE — DISCHARGE NOTE ADULT - HOME CARE AGENCY
[facility]  for RN, physical therapy, Social Work and eval for an aide pending insurance approval  with start of care 10/03/18

## 2018-10-02 NOTE — PHARMACOTHERAPY INTERVENTION NOTE - COMMENTS
Patient is a 72 year old male here for right lower extremity fem-PT bypass with PTFE on 9/26.  He will be discharged tonight on enoxaparin 100mg SC twice daily and warfarin 5mg po once daily.      Patient education provided on warfarin and enoxaparin including:  -  mechanism of each anticoagulant  -  dosing, dosing frequency, and timing of dose  -  monitoring via INR and followup  -  concerns with sub- and supra-therapeutic INRs  -  drug-drug interactions with warfarin and proper management  -  drug-food interactions with warfarin and proper management  -  use of acetaminophen for pain/headache/fevers  -  management of missed doses  -  avoidance of alcohol (since patient is already abstinent)

## 2018-10-02 NOTE — DISCHARGE NOTE ADULT - CARE PLAN
Principal Discharge DX:	Abdominal aortic aneurysm (AAA) without rupture  Goal:	s/p Right lower extremity fem-PT bypass with PTFE  Assessment and plan of treatment:	Lovenox to Coumadin bridge. Plan for d/c home with a rolling walker and home Physical therapy. Principal Discharge DX:	Abdominal aortic aneurysm (AAA) without rupture  Goal:	s/p Right lower extremity fem-PT bypass with PTFE  Assessment and plan of treatment:	WOUND CARE:  please keep incision clean and dry, pat dry when wet, staples to be removed at outpatient follow up appointment   BATHING: Please do not submerge wound underwater. You may shower and/or sponge bathe.  ACTIVITY: No heavy lifting or straining. Otherwise, you may return to your usual level of physical activity. If you are taking narcotic pain medication (such as Percocet) DO NOT drive a car, operate machinery or make important decisions.  DIET: Return to your usual diet.  NOTIFY YOUR SURGEON IF: You have any bleeding that does not stop, any pus draining from your wound(s), any fever (over 100.4 F) or chills, persistent nausea/vomiting, persistent diarrhea, or if your pain is not controlled on your discharge pain medications.  FOLLOW-UP: Please follow up with your primary care physician in one week regarding your hospitalization  Please follow up with Dr Crocker as an outpatient, please call to schedule appointment   You are being discharged on a Lovenox to Coumadin bridge. Please follow up with Dr Seaman as an outpatient to have your INR level monitored, appointment scheduled for....  Plan for d/c home with a rolling walker and home Physical therapy. Principal Discharge DX:	Abdominal aortic aneurysm (AAA) without rupture  Goal:	s/p Right lower extremity fem-PT bypass with PTFE  Assessment and plan of treatment:	WOUND CARE:  please keep incision clean and dry, pat dry when wet, staples to be removed at outpatient follow up appointment     BATHING: Please do not submerge wound underwater. You may shower and/or sponge bathe.    ACTIVITY: No heavy lifting or straining. Otherwise, you may return to your usual level of physical activity. If you are taking narcotic pain medication (such as Percocet) DO NOT drive a car, operate machinery or make important decisions.    DIET: Return to your usual diet.    NOTIFY YOUR SURGEON IF: You have any bleeding that does not stop, any pus draining from your wound(s), any fever (over 100.4 F) or chills, persistent nausea/vomiting, persistent diarrhea, or if your pain is not controlled on your discharge pain medications.  FOLLOW-UP: Please follow up with your primary care physician in one week regarding your hospitalization  Please follow up with Dr Crocker as an outpatient, please call to schedule appointment in 1 week.     You are being discharged on a Lovenox to Coumadin bridge. Please follow up with Dr Seaman on Thursday 10/4 at 1 pm. If you need to change or reschedule your appointment please call office at [phone]

## 2018-10-02 NOTE — DISCHARGE NOTE ADULT - HOSPITAL COURSE
72 year old male presents to presurgical testing s/p endovascular abdominal aneyrysm repair in 12/2017. Pt complaining of 6 month history of calf pain with ambulation. Pt completed CT angiogram and right lower leg angiogram. Pt scheduled for right lower extremity femoral tibial bypass with angiogram for 9/6/18.  71 y/o M s/p endovascular abdominal aneurysm repair in 12/2017 who presented with 6 months of calf pain with ambulation, now POD1 s/p Right lower extremity fem-PT bypass with PTFE. POD 2 pt had  - episode of afib, started on metoprolol q6h to control rate, now converted to junctional rhythm.  - transferred to telemetry for cardiac monitoring, cardiology consulted (Dr. Fong).  - hep gtt to keep graft patent. Will adjust rate PRN per aptt. Plan to transition to coumadin eventually.  Pt had their pt is on heparin gtt for dvt prophylaxis gtt d/cd and continued to take coumadin daily. Plan for pt to be discharged home with home Physical Therapy. Pt will need to be discharged home with a rolling walker. Plan for pt to follow up with Dr. Seaman to have weekly INR levels checked in which it should be between 2.0 and 3.0. Please follow up with your primary care physician regarding your hospitalization 72 year old male presents to presurgical testing s/p endovascular abdominal aneyrysm repair in 12/2017. Pt complaining of 6 month history of calf pain with ambulation. Pt completed CT angiogram and right lower leg angiogram. Pt scheduled for right lower extremity femoral tibial bypass with angiogram for 9/6/18.  73 y/o M s/p endovascular abdominal aneurysm repair in 12/2017 who presented with 6 months of calf pain with ambulation, now POD1 s/p Right lower extremity fem-PT bypass with PTFE. POD 2 pt had  - episode of afib, started on metoprolol q6h to control rate, now converted to junctional rhythm.  - transferred to telemetry for cardiac monitoring, cardiology consulted (Dr. Fong).  - hep gtt to keep graft patent. Will adjust rate PRN per aptt. Plan to transition to coumadin eventually.  Pt had their pt is on heparin gtt for dvt prophylaxis gtt d/cd and continued to take coumadin daily. Plan for pt to be discharged home with home Physical Therapy. Pt will need to be discharged home with a rolling walker. Plan for pt to follow up with Dr. Seaman to have weekly INR levels checked in which it should be between 2.0 and 3.0. Please follow up with your primary care physician regarding your hospitalization  The beneficiary has mobility limitations that significantly impair ability to perform one or more mobile activities of daily living while home. 72 year old male presents to presurgical testing s/p endovascular abdominal aneurysm repair in 12/2017. Pt complaining of 6 month history of calf pain with ambulation. Pt completed CT angiogram and right lower leg angiogram. Pt scheduled for right lower extremity femoral tibial bypass with angiogram.    Pt underwent Right lower extremity fem-PT bypass with PTFE on 9/26 with a provena vac. On 9/27 pt had episode of tachycardia (~ 110). Cardiology was consulted for concern of atrial fibrillation. It was noted that pre-operatively pt was diagnosed with atrial tachycardia and was started on metoprolol. Pt was restarted on metoprolol and monitored on telemetry. Pt was also on a heparin gtt post-operatively to keep graft patent. Pt was bridged  to coumadin on 9/29. Pts INR was monitored daily. On 10/1 heparin gtt was discontinued and pt was started on therapeutic Lovenox to assist in bridging to coumadin. Pt is going to be discharged on theraputic Lovenox with bridge to coumadin. Pt has an appointment with Dr. Seaman on 10/4 for INR check. At this time pt has been deemed stable for discharge.

## 2018-10-02 NOTE — DISCHARGE NOTE ADULT - CARE PROVIDER_API CALL
Oumou Crocker), Surgery  [address] [facility]  [address]  [place] [address]  Phone: [phone]  Fax: [phone] Oumou Crocker), Surgery  [address] [facility]  [address]  [place] [address]  Phone: [phone]  Fax: [phone]    Anthony Fong), Cardiovascular Disease; Nuclear Cardiology  [id number] [place] [id number]  Phone: [phone]  Fax: [phone]

## 2018-10-03 VITALS
SYSTOLIC BLOOD PRESSURE: 121 MMHG | TEMPERATURE: 99 F | RESPIRATION RATE: 18 BRPM | OXYGEN SATURATION: 96 % | DIASTOLIC BLOOD PRESSURE: 80 MMHG | HEART RATE: 66 BPM

## 2018-10-03 LAB
APTT BLD: 40.8 SEC — HIGH (ref 27.5–37.4)
HCT VFR BLD CALC: 27.8 % — LOW (ref 39–50)
HGB BLD-MCNC: 8.8 G/DL — LOW (ref 13–17)
INR BLD: 1.61 — HIGH (ref 0.88–1.17)
MCHC RBC-ENTMCNC: 28.3 PG — SIGNIFICANT CHANGE UP (ref 27–34)
MCHC RBC-ENTMCNC: 31.7 % — LOW (ref 32–36)
MCV RBC AUTO: 89.4 FL — SIGNIFICANT CHANGE UP (ref 80–100)
NRBC # FLD: 0 — SIGNIFICANT CHANGE UP
PLATELET # BLD AUTO: 250 K/UL — SIGNIFICANT CHANGE UP (ref 150–400)
PMV BLD: 10.2 FL — SIGNIFICANT CHANGE UP (ref 7–13)
PROTHROM AB SERPL-ACNC: 18.1 SEC — HIGH (ref 9.8–13.1)
RBC # BLD: 3.11 M/UL — LOW (ref 4.2–5.8)
RBC # FLD: 13.9 % — SIGNIFICANT CHANGE UP (ref 10.3–14.5)
WBC # BLD: 11.69 K/UL — HIGH (ref 3.8–10.5)
WBC # FLD AUTO: 11.69 K/UL — HIGH (ref 3.8–10.5)

## 2018-10-03 RX ORDER — CEPHALEXIN 500 MG
1 CAPSULE ORAL
Qty: 28 | Refills: 0
Start: 2018-10-03 | End: 2018-10-09

## 2018-10-03 RX ADMIN — Medication 81 MILLIGRAM(S): at 11:08

## 2018-10-03 RX ADMIN — FAMOTIDINE 40 MILLIGRAM(S): 10 INJECTION INTRAVENOUS at 05:21

## 2018-10-03 RX ADMIN — Medication 25 MILLIGRAM(S): at 05:21

## 2018-10-03 RX ADMIN — PANTOPRAZOLE SODIUM 40 MILLIGRAM(S): 20 TABLET, DELAYED RELEASE ORAL at 05:21

## 2018-10-03 RX ADMIN — ENOXAPARIN SODIUM 100 MILLIGRAM(S): 100 INJECTION SUBCUTANEOUS at 05:20

## 2018-10-03 RX ADMIN — POLYETHYLENE GLYCOL 3350 17 GRAM(S): 17 POWDER, FOR SOLUTION ORAL at 11:09

## 2018-10-03 RX ADMIN — Medication 90 MILLIGRAM(S): at 05:20

## 2018-10-03 NOTE — PROGRESS NOTE ADULT - ASSESSMENT
71 y/o M s/p endovascular abdominal aneurysm repair in 12/2017, now s/p Right lower extremity fem-PT bypass with PTFE, with exclusion, for popliteal artery aneurysm, performed on 9/26.    Plan:  - Coumadin nightly, following INR daily. Therapeutic lovenox, will f/u insurance affordability.  - PT/OOB/ambulate as tolerated.  - C/w home meds.  - ASA daily.  - Pain control.    -Dispo planning. Dispo today on 7 days Keflex course

## 2018-10-03 NOTE — PROGRESS NOTE ADULT - SUBJECTIVE AND OBJECTIVE BOX
Vascular Surgery Progress Note    Subjective:   Pt seen & examined at bedside. Pain is well controlled. No new complaints. No F/C, N/V, CP/SOB.    Medications:  aspirin  chewable 81  enoxaparin Injectable 100  metoprolol tartrate 25  NIFEdipine XL 90      Vital Signs Last 24 Hrs  T(C): 37.3 (03 Oct 2018 10:06), Max: 37.6 (02 Oct 2018 17:03)  T(F): 99.2 (03 Oct 2018 10:06), Max: 99.7 (02 Oct 2018 17:03)  HR: 66 (03 Oct 2018 10:06) (66 - 103)  BP: 121/80 (03 Oct 2018 10:06) (121/80 - 134/77)  BP(mean): --  RR: 18 (03 Oct 2018 10:06) (18 - 18)  SpO2: 96% (03 Oct 2018 10:06) (93% - 97%)    I&O's Summary    02 Oct 2018 07:01  -  03 Oct 2018 07:00  --------------------------------------------------------  IN: 236 mL / OUT: 2725 mL / NET: -2489 mL    03 Oct 2018 07:01  -  03 Oct 2018 15:37  --------------------------------------------------------  IN: 0 mL / OUT: 300 mL / NET: -300 mL        Physical Exam:  Gen: NAD, A&Ox3  CV: No increased WOB  Ext: moderate edema RLE  Pulses: radial pulses 2+ bilaterally.  PT palpable RLE    LABS:                        8.8    11.69 )-----------( 250      ( 03 Oct 2018 04:13 )             27.8     10-02    138  |  100  |  7   ----------------------------<  136<H>  3.9   |  22  |  1.06    Ca    8.7      02 Oct 2018 05:40  Phos  4.0     10-02  Mg     2.1     10-02      PT/INR - ( 03 Oct 2018 04:13 )   PT: 18.1 SEC;   INR: 1.61          PTT - ( 03 Oct 2018 04:13 )  PTT:40.8 SEC

## 2018-10-15 ENCOUNTER — APPOINTMENT (OUTPATIENT)
Dept: VASCULAR SURGERY | Facility: CLINIC | Age: 73
End: 2018-10-15
Payer: MEDICARE

## 2018-10-15 VITALS
TEMPERATURE: 98.4 F | HEART RATE: 62 BPM | DIASTOLIC BLOOD PRESSURE: 71 MMHG | SYSTOLIC BLOOD PRESSURE: 128 MMHG | WEIGHT: 234 LBS | HEIGHT: 67 IN | BODY MASS INDEX: 36.73 KG/M2

## 2018-10-15 PROCEDURE — 99024 POSTOP FOLLOW-UP VISIT: CPT

## 2018-10-15 RX ORDER — SULFAMETHOXAZOLE AND TRIMETHOPRIM 800; 160 MG/1; MG/1
800-160 TABLET ORAL TWICE DAILY
Qty: 20 | Refills: 0 | Status: ACTIVE | COMMUNITY
Start: 2018-10-15 | End: 1900-01-01

## 2018-10-22 ENCOUNTER — APPOINTMENT (OUTPATIENT)
Dept: VASCULAR SURGERY | Facility: CLINIC | Age: 73
End: 2018-10-22
Payer: MEDICARE

## 2018-10-22 VITALS
WEIGHT: 234 LBS | HEART RATE: 67 BPM | SYSTOLIC BLOOD PRESSURE: 125 MMHG | TEMPERATURE: 98.2 F | HEIGHT: 67 IN | DIASTOLIC BLOOD PRESSURE: 80 MMHG | BODY MASS INDEX: 36.73 KG/M2

## 2018-10-22 PROCEDURE — 99024 POSTOP FOLLOW-UP VISIT: CPT

## 2018-10-22 PROCEDURE — 93926 LOWER EXTREMITY STUDY: CPT

## 2018-10-29 ENCOUNTER — APPOINTMENT (OUTPATIENT)
Dept: VASCULAR SURGERY | Facility: CLINIC | Age: 73
End: 2018-10-29
Payer: MEDICARE

## 2018-10-29 VITALS
SYSTOLIC BLOOD PRESSURE: 124 MMHG | WEIGHT: 240 LBS | BODY MASS INDEX: 37.23 KG/M2 | DIASTOLIC BLOOD PRESSURE: 80 MMHG | TEMPERATURE: 98 F | HEIGHT: 67.5 IN | HEART RATE: 66 BPM

## 2018-10-29 PROCEDURE — 99024 POSTOP FOLLOW-UP VISIT: CPT

## 2018-11-12 ENCOUNTER — APPOINTMENT (OUTPATIENT)
Dept: VASCULAR SURGERY | Facility: CLINIC | Age: 73
End: 2018-11-12
Payer: MEDICARE

## 2018-11-12 VITALS
DIASTOLIC BLOOD PRESSURE: 82 MMHG | BODY MASS INDEX: 36.45 KG/M2 | TEMPERATURE: 98.2 F | HEART RATE: 68 BPM | SYSTOLIC BLOOD PRESSURE: 128 MMHG | HEIGHT: 67.5 IN | WEIGHT: 235 LBS

## 2018-11-12 PROCEDURE — 99024 POSTOP FOLLOW-UP VISIT: CPT

## 2018-12-17 ENCOUNTER — APPOINTMENT (OUTPATIENT)
Dept: VASCULAR SURGERY | Facility: CLINIC | Age: 73
End: 2018-12-17
Payer: MEDICARE

## 2018-12-17 VITALS
DIASTOLIC BLOOD PRESSURE: 68 MMHG | BODY MASS INDEX: 36.45 KG/M2 | HEIGHT: 67.5 IN | HEART RATE: 71 BPM | WEIGHT: 235 LBS | SYSTOLIC BLOOD PRESSURE: 119 MMHG | TEMPERATURE: 98.3 F

## 2018-12-17 PROCEDURE — 93926 LOWER EXTREMITY STUDY: CPT

## 2018-12-17 PROCEDURE — 99024 POSTOP FOLLOW-UP VISIT: CPT

## 2019-02-23 ENCOUNTER — RX RENEWAL (OUTPATIENT)
Age: 74
End: 2019-02-23

## 2019-02-23 RX ORDER — METOPROLOL TARTRATE 25 MG/1
25 TABLET, FILM COATED ORAL TWICE DAILY
Qty: 60 | Refills: 3 | Status: ACTIVE | COMMUNITY
Start: 2019-02-23 | End: 1900-01-01

## 2019-03-18 ENCOUNTER — APPOINTMENT (OUTPATIENT)
Dept: VASCULAR SURGERY | Facility: CLINIC | Age: 74
End: 2019-03-18
Payer: MEDICARE

## 2019-03-18 VITALS
HEART RATE: 63 BPM | TEMPERATURE: 98.2 F | HEIGHT: 67.5 IN | WEIGHT: 235 LBS | SYSTOLIC BLOOD PRESSURE: 125 MMHG | BODY MASS INDEX: 36.45 KG/M2 | DIASTOLIC BLOOD PRESSURE: 79 MMHG

## 2019-03-18 PROCEDURE — ZZZZZ: CPT

## 2019-03-18 PROCEDURE — 93926 LOWER EXTREMITY STUDY: CPT

## 2019-03-18 PROCEDURE — 99213 OFFICE O/P EST LOW 20 MIN: CPT

## 2019-03-19 NOTE — ASSESSMENT
[FreeTextEntry1] : A right lower extremity surveillance ultrasound was performed in the office today, which showed a patent femoral-posterior tibial artery bypass.\par \par Mr. Sharp reports that he recently had labwork performed by his PCP. We will obtain the results (BMP) and schedule him for a follow up CTA abd/pelvis to assess the EVAR repair.

## 2019-03-19 NOTE — PHYSICAL EXAM
[de-identified] : PE: On physical examination, the patient is NAD. AAOx3. Neurologically intact. The lungs are clear to auscultation and the heart has a regular rate and rhythm. The abdomen is soft, without pulsatile mass. Bilateral femoral pulses are palpable. Right leg edema has decreased. Right foot is warm and well perfused. Right leg incisions are clean, dry, and intact. Right groin incision has healed.

## 2019-03-19 NOTE — HISTORY OF PRESENT ILLNESS
[FreeTextEntry1] : I had the pleasure of seeing Mr. Noe Sharp ( [birth year]) in follow-up. Mr. Sharp is a nice 73-year-old gentleman with a history of EVAR for AAA, who is now status post repair of right popliteal artery aneurysm with femoral-posterior tibial artery bypass with exclusion. Surgery was performed on 18. \par \par Since his last visit, Mr. Sharp's groin wound has healed entirely. He denies any lower extremity erythema or drainage. His edema has improved. He is ambulating without difficulty and his right foot is warm and well perfused. He denies any claudication, rest pain or lower extremity wounds. \par \par His medical history is otherwise significant for HTN, PUD/GERD\par \par Medications:  Aspirin, Cardizem, Coumadin, Lipitor, Metoprolol, Famotidine, and Protonix \par \par Allergies: NKDA\par \par SH: Former smoker\par \par FH: NC\par

## 2019-04-10 ENCOUNTER — OUTPATIENT (OUTPATIENT)
Dept: OUTPATIENT SERVICES | Facility: HOSPITAL | Age: 74
LOS: 1 days | End: 2019-04-10
Payer: MEDICARE

## 2019-04-10 ENCOUNTER — APPOINTMENT (OUTPATIENT)
Dept: CT IMAGING | Facility: IMAGING CENTER | Age: 74
End: 2019-04-10
Payer: MEDICARE

## 2019-04-10 DIAGNOSIS — Z98.49 CATARACT EXTRACTION STATUS, UNSPECIFIED EYE: Chronic | ICD-10-CM

## 2019-04-10 DIAGNOSIS — Z96.7 PRESENCE OF OTHER BONE AND TENDON IMPLANTS: Chronic | ICD-10-CM

## 2019-04-10 DIAGNOSIS — Z00.8 ENCOUNTER FOR OTHER GENERAL EXAMINATION: ICD-10-CM

## 2019-04-10 DIAGNOSIS — T82.330A LEAKAGE OF AORTIC (BIFURCATION) GRAFT (REPLACEMENT), INITIAL ENCOUNTER: Chronic | ICD-10-CM

## 2019-04-10 PROCEDURE — 82565 ASSAY OF CREATININE: CPT

## 2019-04-10 PROCEDURE — 74174 CTA ABD&PLVS W/CONTRAST: CPT

## 2019-04-10 PROCEDURE — 74174 CTA ABD&PLVS W/CONTRAST: CPT | Mod: 26

## 2019-04-13 NOTE — PATIENT PROFILE ADULT. - PATIENT REPRESENTATIVE: ( YOU CAN CHOOSE ANY PERSON THAT CAN ASSIST YOU WITH YOUR HEALTH CARE PREFERENCES, DOES NOT HAVE TO BE A SPOUSE, IMMEDIATE FAMILY OR SIGNIFICANT OTHER/PARTNER)
no focal deficits, no motor or sensory deficits. +A&O x0 +has tangential thoughts +will not answer when asked if has SI/HI +tearful
Declines

## 2020-01-10 NOTE — H&P PST ADULT - BMI (KG/M2)
A Healthy Lifestyle: Care Instructions  Your Care Instructions    A healthy lifestyle can help you feel good, stay at a healthy weight, and have plenty of energy for both work and play. A healthy lifestyle is something you can share with your whole family. A healthy lifestyle also can lower your risk for serious health problems, such as high blood pressure, heart disease, and diabetes. You can follow a few steps listed below to improve your health and the health of your family. Follow-up care is a key part of your treatment and safety. Be sure to make and go to all appointments, and call your doctor if you are having problems. It's also a good idea to know your test results and keep a list of the medicines you take. How can you care for yourself at home? · Do not eat too much sugar, fat, or fast foods. You can still have dessert and treats now and then. The goal is moderation. · Start small to improve your eating habits. Pay attention to portion sizes, drink less juice and soda pop, and eat more fruits and vegetables. ? Eat a healthy amount of food. A 3-ounce serving of meat, for example, is about the size of a deck of cards. Fill the rest of your plate with vegetables and whole grains. ? Limit the amount of soda and sports drinks you have every day. Drink more water when you are thirsty. ? Eat at least 5 servings of fruits and vegetables every day. It may seem like a lot, but it is not hard to reach this goal. A serving or helping is 1 piece of fruit, 1 cup of vegetables, or 2 cups of leafy, raw vegetables. Have an apple or some carrot sticks as an afternoon snack instead of a candy bar. Try to have fruits and/or vegetables at every meal.  · Make exercise part of your daily routine. You may want to start with simple activities, such as walking, bicycling, or slow swimming. Try to be active 30 to 60 minutes every day. You do not need to do all 30 to 60 minutes all at once.  For example, you can exercise 3 times a day for 10 or 20 minutes. Moderate exercise is safe for most people, but it is always a good idea to talk to your doctor before starting an exercise program.  · Keep moving. Walker Cluck the lawn, work in the garden, or [employer]. Take the stairs instead of the elevator at work. · If you smoke, quit. People who smoke have an increased risk for heart attack, stroke, cancer, and other lung illnesses. Quitting is hard, but there are ways to boost your chance of quitting tobacco for good. ? Use nicotine gum, patches, or lozenges. ? Ask your doctor about stop-smoking programs and medicines. ? Keep trying. In addition to reducing your risk of diseases in the future, you will notice some benefits soon after you stop using tobacco. If you have shortness of breath or asthma symptoms, they will likely get better within a few weeks after you quit. · Limit how much alcohol you drink. Moderate amounts of alcohol (up to 2 drinks a day for men, 1 drink a day for women) are okay. But drinking too much can lead to liver problems, high blood pressure, and other health problems. Family health  If you have a family, there are many things you can do together to improve your health. · Eat meals together as a family as often as possible. · Eat healthy foods. This includes fruits, vegetables, lean meats and dairy, and whole grains. · Include your family in your fitness plan. Most people think of activities such as jogging or tennis as the way to fitness, but there are many ways you and your family can be more active. Anything that makes you breathe hard and gets your heart pumping is exercise. Here are some tips:  ? Walk to do errands or to take your child to school or the bus.  ? Go for a family bike ride after dinner instead of watching TV. Where can you learn more? Go to http://zackery-jessica.info/. Enter B311 in the search box to learn more about \"A Healthy Lifestyle: Care Instructions. \"  Current as of: May 28, 2019  Content Version: 12.2  © 9375-6768 [employer]. Care instructions adapted under license by [employer] (which disclaims liability or warranty for this information). If you have questions about a medical condition or this instruction, always ask your healthcare professional. [address] any warranty or liability for your use of this information. Office Policies    o Phone calls/patient messages:  Please allow up to 24 hours for someone in the office to contact you about your call or message. Be mindful your provider may be out of the office or your message may require further review. We encourage you to use [facility] for your messages as this is a faster, more efficient way to communicate with our office    o Medication Refills:  Prescription medications require up to 48 business hours to process. We encourage you to use [facility] for your refills. For controlled medications: Please allow up to 72 business hours to process. Certain medications may require you to  a written prescription at our office. NO narcotic/controlled medications will be prescribed after 4pm Monday through Friday or on weekends    o Form/Paperwork Completion:  We ask that you allow 7-14 business days. You may also download your forms to [facility] to have your doctor print off. 36.9

## 2020-02-04 NOTE — ED PROVIDER NOTE - PRINCIPAL DIAGNOSIS
Subjective   Patient ID: Nuha is a 40 year old female.    Chief Complaint   Patient presents with   • Cough     Patient presents with cough for 1 month.  She saw pulmonology in April 2019.  Was diagnosed with mild persistent asthma, component of upper airway cough syndrome.  She has been continuing to take the Symbicort daily.  She did not know she was supposed to keep taking the Singulair.  At that time she was treated also with albuterol, prednisone, prescription cough syrup.  This was sufficient to stop her cough then.  She has been taking NyQuil, DayQuil, Robitussin and has not helped her now.  She is feeling some chest tightness and possibly wheezing particularly at nighttime.  She has been using the albuterol several times a day.  Last used it about 6 hours ago this morning.  No fever, body aches, headache.  She does have some mild nasal congestion.  +Post-nasal drip.  She has some degree of postnasal drip all year round.      No past medical history on file.  Past Surgical History:   Procedure Laterality Date   • Cholecystectomy     • Tonsillectomy and adenoidectomy       Social History     Tobacco Use   • Smoking status: Never Smoker   • Smokeless tobacco: Never Used   Substance Use Topics   • Alcohol use: Yes     Comment: occ   • Drug use: No     Family History   Problem Relation Age of Onset   • Myocardial Infarction Father      levothyroxine (SYNTHROID, LEVOTHROID) 100 MCG tablet  sertraline (ZOLOFT) 100 MG tablet  sumatriptan (IMITREX) 100 MG tablet  albuterol 108 (90 Base) MCG/ACT inhaler    No current facility-administered medications on file prior to visit.     ALLERGIES:  No Known Allergies    Review of Systems   Constitutional: Negative for activity change, appetite change, chills, diaphoresis, fatigue and fever.   HENT: Positive for congestion (mild) and postnasal drip (perennial). Negative for ear pain, hearing loss, mouth sores, rhinorrhea, sinus pain, sneezing, sore throat and tinnitus.    Eyes:  Negative for discharge, redness and itching.   Respiratory: Positive for cough and chest tightness. Negative for shortness of breath and wheezing.    Cardiovascular: Negative for chest pain and palpitations.   Musculoskeletal: Negative for arthralgias and myalgias.   Skin: Negative for rash.   Neurological: Negative for dizziness and headaches.       Objective   Physical Exam  Vitals signs reviewed.   Constitutional:       General: She is not in acute distress.     Appearance: She is well-developed.   HENT:      Head: Normocephalic.      Right Ear: Tympanic membrane, ear canal and external ear normal.      Left Ear: Tympanic membrane, ear canal and external ear normal.      Nose: Nose normal.      Mouth/Throat:      Mouth: Mucous membranes are moist.      Pharynx: Oropharynx is clear. No oropharyngeal exudate or posterior oropharyngeal erythema.      Comments: PND  Eyes:      General:         Right eye: No discharge.         Left eye: No discharge.      Conjunctiva/sclera: Conjunctivae normal.      Pupils: Pupils are equal, round, and reactive to light.   Neck:      Musculoskeletal: Normal range of motion and neck supple.      Thyroid: No thyromegaly.   Cardiovascular:      Rate and Rhythm: Normal rate and regular rhythm.      Pulses: Normal pulses.      Heart sounds: Normal heart sounds.   Pulmonary:      Effort: Pulmonary effort is normal. No respiratory distress.      Breath sounds: Normal breath sounds. No stridor. No wheezing, rhonchi or rales.      Comments: Slightly tight diffusely, no active wheezing  Lymphadenopathy:      Cervical: No cervical adenopathy.   Skin:     General: Skin is warm and dry.      Findings: No rash.   Neurological:      Mental Status: She is alert and oriented to person, place, and time.   Psychiatric:         Behavior: Behavior normal.         Assessment   Problem List Items Addressed This Visit        Respiratory    Asthma -refill Symbicort.  Resume Singulair daily.  Burst of  prednisone 40 mg daily x5 days now. Albuterol q4-6h prn. Prescription cough syrup also refilled as this combination was effective for her last year. Treat PND    Relevant Medications    montelukast (SINGULAIR) 10 MG tablet    predniSONE (DELTASONE) 20 MG tablet    budesonide-formoterol (SYMBICORT) 160-4.5 MCG/ACT inhaler      Other Visit Diagnoses     Upper respiratory tract infection, unspecified type        Relevant Medications    HYDROcodone-homatropine (HYDROMET) 5-1.5 MG/5ML syrup         AAA (abdominal aortic aneurysm) without rupture

## 2020-02-20 ENCOUNTER — OUTPATIENT (OUTPATIENT)
Dept: OUTPATIENT SERVICES | Facility: HOSPITAL | Age: 75
LOS: 1 days | End: 2020-02-20
Payer: MEDICARE

## 2020-02-20 ENCOUNTER — APPOINTMENT (OUTPATIENT)
Dept: CT IMAGING | Facility: IMAGING CENTER | Age: 75
End: 2020-02-20
Payer: MEDICARE

## 2020-02-20 DIAGNOSIS — T82.330A LEAKAGE OF AORTIC (BIFURCATION) GRAFT (REPLACEMENT), INITIAL ENCOUNTER: Chronic | ICD-10-CM

## 2020-02-20 DIAGNOSIS — Z96.7 PRESENCE OF OTHER BONE AND TENDON IMPLANTS: Chronic | ICD-10-CM

## 2020-02-20 DIAGNOSIS — I71.4 ABDOMINAL AORTIC ANEURYSM, WITHOUT RUPTURE: ICD-10-CM

## 2020-02-20 DIAGNOSIS — Z98.49 CATARACT EXTRACTION STATUS, UNSPECIFIED EYE: Chronic | ICD-10-CM

## 2020-02-20 PROCEDURE — 82565 ASSAY OF CREATININE: CPT

## 2020-02-20 PROCEDURE — 74174 CTA ABD&PLVS W/CONTRAST: CPT | Mod: 26

## 2020-02-20 PROCEDURE — 74174 CTA ABD&PLVS W/CONTRAST: CPT

## 2020-02-25 ENCOUNTER — APPOINTMENT (OUTPATIENT)
Dept: VASCULAR SURGERY | Facility: CLINIC | Age: 75
End: 2020-02-25
Payer: MEDICARE

## 2020-02-25 VITALS
WEIGHT: 228 LBS | DIASTOLIC BLOOD PRESSURE: 66 MMHG | HEIGHT: 67.5 IN | SYSTOLIC BLOOD PRESSURE: 104 MMHG | TEMPERATURE: 98.2 F | HEART RATE: 56 BPM | BODY MASS INDEX: 35.37 KG/M2

## 2020-02-25 PROCEDURE — 93926 LOWER EXTREMITY STUDY: CPT

## 2020-02-25 PROCEDURE — 99213 OFFICE O/P EST LOW 20 MIN: CPT

## 2020-02-25 NOTE — PHYSICAL EXAM
[de-identified] : PE: On physical examination, the patient is NAD. AAOx3. Neurologically intact. The lungs are clear to auscultation and the heart has a regular rate and rhythm. The abdomen is soft, without pulsatile mass. Bilateral femoral pulses are palpable. Right leg edema has decreased. Right foot is warm and well perfused. Right leg incisions are clean, dry, and intact. Right groin incision has healed.

## 2020-02-25 NOTE — ASSESSMENT
[FreeTextEntry1] : A RLE duplex showed patent bypass without flow limiting lesions. \par \par I will contact his pulmonologist and thoracic [place] [facility] [place] determine whether Coumadin may be restarted.\par \par He will undergo surveillance studies in one year (duplex and CTA a/p). We will attempt to coordinate his CT with his oncologic studies.

## 2020-02-25 NOTE — HISTORY OF PRESENT ILLNESS
[FreeTextEntry1] : I had the pleasure of seeing Mr. Noe Sharp in follow-up. Mr. Sharp is a nice 74-year-old gentleman with a history of EVAR for AAA (12/13/17), who subsequently underwent repair of right popliteal artery aneurysm with femoral-posterior tibial artery bypass with PTFE with exclusion (9/26/18).\par \par Since his last visit, Mr. Sharp's has undergone thoracic surgery for lung cancer at [facility]. He is not currently on chemoradiation. During his hospitalization he developed bleeding from the respiratory tract and Coumadin was stopped. His daughter reports that an IVC filter was placed (for PE?). He has not been on anticoagulation since December. We will attempt to obtain records from [facility] and restart Coumadin if no contraindication. Mr. Sharp is scheduled to see a hepatologist for cirrhosis vs liver lesion.\par \par He denies any complaints related to his RLE bypass including claudication, rest pain, or tissue loss.\par \par CTA a/p shows stable AAA sac (4cm) with stable endoleak.\par \par His medical history is otherwise significant for HTN, PUD/GERD\par \par Medications:  Aspirin, Cardizem, Lipitor, Metoprolol, Famotidine, and Protonix \par \par Allergies: NKDA\par \par SH: Former smoker\par \par FH: NC\par

## 2020-03-05 RX ORDER — RIVAROXABAN 20 MG/1
20 TABLET, FILM COATED ORAL
Qty: 90 | Refills: 0 | Status: ACTIVE | COMMUNITY
Start: 2020-03-05 | End: 1900-01-01

## 2020-03-05 RX ORDER — RIVAROXABAN 15 MG/1
15 TABLET, FILM COATED ORAL
Qty: 42 | Refills: 0 | Status: ACTIVE | COMMUNITY
Start: 2020-03-05 | End: 1900-01-01

## 2020-03-18 ENCOUNTER — RX RENEWAL (OUTPATIENT)
Age: 75
End: 2020-03-18

## 2020-03-19 ENCOUNTER — RX RENEWAL (OUTPATIENT)
Age: 75
End: 2020-03-19

## 2020-06-08 RX ORDER — RIVAROXABAN 20 MG/1
20 TABLET, FILM COATED ORAL
Qty: 60 | Refills: 0 | Status: ACTIVE | COMMUNITY
Start: 2020-06-08 | End: 1900-01-01

## 2020-06-09 ENCOUNTER — RX RENEWAL (OUTPATIENT)
Age: 75
End: 2020-06-09

## 2020-06-11 ENCOUNTER — RX RENEWAL (OUTPATIENT)
Age: 75
End: 2020-06-11

## 2021-06-16 NOTE — H&P PST ADULT - BP NONINVASIVE SYSTOLIC (MM HG)
Personally reviewed labs, imaging, EKG.                          12.7   7.53  )-----------( 323      ( 16 Jun 2021 17:14 )             38.7       06-16    136  |  101  |  10  ----------------------------<  106<H>  3.2<L>   |  23  |  0.70    Ca    8.9      16 Jun 2021 17:14  Phos  2.9     06-16  Mg     1.7     06-16    TPro  6.5  /  Alb  3.4  /  TBili  1.9<H>  /  DBili  1.3<H>  /  AST  345<H>  /  ALT  239<H>  /  AlkPhos  724<H>  06-16          Lactate Trend    CAPILLARY BLOOD GLUCOSE
120

## 2022-01-01 ENCOUNTER — INPATIENT (INPATIENT)
Facility: HOSPITAL | Age: 77
LOS: 12 days | Discharge: EXPIRED | End: 2022-10-04
Attending: INTERNAL MEDICINE | Admitting: INTERNAL MEDICINE

## 2022-01-01 ENCOUNTER — RESULT REVIEW (OUTPATIENT)
Age: 77
End: 2022-01-01

## 2022-01-01 VITALS
HEART RATE: 89 BPM | RESPIRATION RATE: 20 BRPM | OXYGEN SATURATION: 97 % | DIASTOLIC BLOOD PRESSURE: 75 MMHG | TEMPERATURE: 98 F | SYSTOLIC BLOOD PRESSURE: 112 MMHG

## 2022-01-01 VITALS
HEART RATE: 73 BPM | DIASTOLIC BLOOD PRESSURE: 89 MMHG | RESPIRATION RATE: 16 BRPM | HEIGHT: 66.5 IN | SYSTOLIC BLOOD PRESSURE: 102 MMHG | OXYGEN SATURATION: 94 %

## 2022-01-01 DIAGNOSIS — J96.01 ACUTE RESPIRATORY FAILURE WITH HYPOXIA: ICD-10-CM

## 2022-01-01 DIAGNOSIS — Z71.89 OTHER SPECIFIED COUNSELING: ICD-10-CM

## 2022-01-01 DIAGNOSIS — Z98.49 CATARACT EXTRACTION STATUS, UNSPECIFIED EYE: Chronic | ICD-10-CM

## 2022-01-01 DIAGNOSIS — E87.5 HYPERKALEMIA: ICD-10-CM

## 2022-01-01 DIAGNOSIS — E87.1 HYPO-OSMOLALITY AND HYPONATREMIA: ICD-10-CM

## 2022-01-01 DIAGNOSIS — J96.02 ACUTE RESPIRATORY FAILURE WITH HYPERCAPNIA: ICD-10-CM

## 2022-01-01 DIAGNOSIS — N17.9 ACUTE KIDNEY FAILURE, UNSPECIFIED: ICD-10-CM

## 2022-01-01 DIAGNOSIS — J44.1 CHRONIC OBSTRUCTIVE PULMONARY DISEASE WITH (ACUTE) EXACERBATION: ICD-10-CM

## 2022-01-01 DIAGNOSIS — I10 ESSENTIAL (PRIMARY) HYPERTENSION: ICD-10-CM

## 2022-01-01 DIAGNOSIS — C34.90 MALIGNANT NEOPLASM OF UNSPECIFIED PART OF UNSPECIFIED BRONCHUS OR LUNG: ICD-10-CM

## 2022-01-01 DIAGNOSIS — F17.200 NICOTINE DEPENDENCE, UNSPECIFIED, UNCOMPLICATED: ICD-10-CM

## 2022-01-01 DIAGNOSIS — T82.330A LEAKAGE OF AORTIC (BIFURCATION) GRAFT (REPLACEMENT), INITIAL ENCOUNTER: Chronic | ICD-10-CM

## 2022-01-01 DIAGNOSIS — K21.9 GASTRO-ESOPHAGEAL REFLUX DISEASE WITHOUT ESOPHAGITIS: ICD-10-CM

## 2022-01-01 DIAGNOSIS — Z51.5 ENCOUNTER FOR PALLIATIVE CARE: ICD-10-CM

## 2022-01-01 DIAGNOSIS — Z96.7 PRESENCE OF OTHER BONE AND TENDON IMPLANTS: Chronic | ICD-10-CM

## 2022-01-01 DIAGNOSIS — R77.8 OTHER SPECIFIED ABNORMALITIES OF PLASMA PROTEINS: ICD-10-CM

## 2022-01-01 DIAGNOSIS — Z29.9 ENCOUNTER FOR PROPHYLACTIC MEASURES, UNSPECIFIED: ICD-10-CM

## 2022-01-01 DIAGNOSIS — K59.00 CONSTIPATION, UNSPECIFIED: ICD-10-CM

## 2022-01-01 LAB
A1C WITH ESTIMATED AVERAGE GLUCOSE RESULT: 6.3 % — HIGH (ref 4–5.6)
ALBUMIN FLD-MCNC: 2.8 G/DL — SIGNIFICANT CHANGE UP
ALBUMIN SERPL ELPH-MCNC: 4.1 G/DL — SIGNIFICANT CHANGE UP (ref 3.3–5)
ALP SERPL-CCNC: 142 U/L — HIGH (ref 40–120)
ALT FLD-CCNC: 23 U/L — SIGNIFICANT CHANGE UP (ref 4–41)
ANION GAP SERPL CALC-SCNC: 11 MMOL/L — SIGNIFICANT CHANGE UP (ref 7–14)
ANION GAP SERPL CALC-SCNC: 12 MMOL/L — SIGNIFICANT CHANGE UP (ref 7–14)
ANION GAP SERPL CALC-SCNC: 13 MMOL/L — SIGNIFICANT CHANGE UP (ref 7–14)
ANION GAP SERPL CALC-SCNC: 14 MMOL/L — SIGNIFICANT CHANGE UP (ref 7–14)
ANION GAP SERPL CALC-SCNC: 17 MMOL/L — HIGH (ref 7–14)
ANION GAP SERPL CALC-SCNC: 7 MMOL/L — SIGNIFICANT CHANGE UP (ref 7–14)
ANION GAP SERPL CALC-SCNC: 7 MMOL/L — SIGNIFICANT CHANGE UP (ref 7–14)
ANION GAP SERPL CALC-SCNC: 8 MMOL/L — SIGNIFICANT CHANGE UP (ref 7–14)
ANION GAP SERPL CALC-SCNC: 9 MMOL/L — SIGNIFICANT CHANGE UP (ref 7–14)
ANION GAP SERPL CALC-SCNC: 9 MMOL/L — SIGNIFICANT CHANGE UP (ref 7–14)
APPEARANCE UR: CLEAR — SIGNIFICANT CHANGE UP
APPEARANCE UR: CLEAR — SIGNIFICANT CHANGE UP
APTT BLD: 26.5 SEC — LOW (ref 27–36.3)
AST SERPL-CCNC: 123 U/L — HIGH (ref 4–40)
B PERT DNA SPEC QL NAA+PROBE: SIGNIFICANT CHANGE UP
B PERT IGG+IGM PNL SER: ABNORMAL
B PERT+PARAPERT DNA PNL SPEC NAA+PROBE: SIGNIFICANT CHANGE UP
BASE EXCESS BLDA CALC-SCNC: 0.7 MMOL/L — SIGNIFICANT CHANGE UP (ref -2–3)
BASE EXCESS BLDV CALC-SCNC: -0.7 MMOL/L — SIGNIFICANT CHANGE UP (ref -2–3)
BASE EXCESS BLDV CALC-SCNC: 0.3 MMOL/L — SIGNIFICANT CHANGE UP (ref -2–3)
BASOPHILS # BLD AUTO: 0.04 K/UL — SIGNIFICANT CHANGE UP (ref 0–0.2)
BASOPHILS NFR BLD AUTO: 0.2 % — SIGNIFICANT CHANGE UP (ref 0–2)
BILIRUB SERPL-MCNC: 0.2 MG/DL — SIGNIFICANT CHANGE UP (ref 0.2–1.2)
BILIRUB UR-MCNC: NEGATIVE — SIGNIFICANT CHANGE UP
BILIRUB UR-MCNC: NEGATIVE — SIGNIFICANT CHANGE UP
BLOOD GAS VENOUS COMPREHENSIVE RESULT: SIGNIFICANT CHANGE UP
BORDETELLA PARAPERTUSSIS (RAPRVP): SIGNIFICANT CHANGE UP
BUN SERPL-MCNC: 15 MG/DL — SIGNIFICANT CHANGE UP (ref 7–23)
BUN SERPL-MCNC: 15 MG/DL — SIGNIFICANT CHANGE UP (ref 7–23)
BUN SERPL-MCNC: 22 MG/DL — SIGNIFICANT CHANGE UP (ref 7–23)
BUN SERPL-MCNC: 31 MG/DL — HIGH (ref 7–23)
BUN SERPL-MCNC: 38 MG/DL — HIGH (ref 7–23)
BUN SERPL-MCNC: 40 MG/DL — HIGH (ref 7–23)
BUN SERPL-MCNC: 41 MG/DL — HIGH (ref 7–23)
BUN SERPL-MCNC: 41 MG/DL — HIGH (ref 7–23)
BUN SERPL-MCNC: 47 MG/DL — HIGH (ref 7–23)
BUN SERPL-MCNC: 51 MG/DL — HIGH (ref 7–23)
BUN SERPL-MCNC: 52 MG/DL — HIGH (ref 7–23)
BUN SERPL-MCNC: 52 MG/DL — HIGH (ref 7–23)
C PNEUM DNA SPEC QL NAA+PROBE: SIGNIFICANT CHANGE UP
CA-I SERPL-SCNC: 1.12 MMOL/L — LOW (ref 1.15–1.33)
CALCIUM SERPL-MCNC: 8.3 MG/DL — LOW (ref 8.4–10.5)
CALCIUM SERPL-MCNC: 8.3 MG/DL — LOW (ref 8.4–10.5)
CALCIUM SERPL-MCNC: 8.5 MG/DL — SIGNIFICANT CHANGE UP (ref 8.4–10.5)
CALCIUM SERPL-MCNC: 8.5 MG/DL — SIGNIFICANT CHANGE UP (ref 8.4–10.5)
CALCIUM SERPL-MCNC: 8.6 MG/DL — SIGNIFICANT CHANGE UP (ref 8.4–10.5)
CALCIUM SERPL-MCNC: 8.6 MG/DL — SIGNIFICANT CHANGE UP (ref 8.4–10.5)
CALCIUM SERPL-MCNC: 8.7 MG/DL — SIGNIFICANT CHANGE UP (ref 8.4–10.5)
CALCIUM SERPL-MCNC: 8.8 MG/DL — SIGNIFICANT CHANGE UP (ref 8.4–10.5)
CALCIUM SERPL-MCNC: 8.9 MG/DL — SIGNIFICANT CHANGE UP (ref 8.4–10.5)
CALCIUM SERPL-MCNC: 9.2 MG/DL — SIGNIFICANT CHANGE UP (ref 8.4–10.5)
CHLORIDE BLDV-SCNC: 83 MMOL/L — LOW (ref 96–108)
CHLORIDE BLDV-SCNC: 86 MMOL/L — LOW (ref 96–108)
CHLORIDE SERPL-SCNC: 81 MMOL/L — LOW (ref 98–107)
CHLORIDE SERPL-SCNC: 82 MMOL/L — LOW (ref 98–107)
CHLORIDE SERPL-SCNC: 82 MMOL/L — LOW (ref 98–107)
CHLORIDE SERPL-SCNC: 85 MMOL/L — LOW (ref 98–107)
CHLORIDE SERPL-SCNC: 89 MMOL/L — LOW (ref 98–107)
CHLORIDE SERPL-SCNC: 89 MMOL/L — LOW (ref 98–107)
CHLORIDE SERPL-SCNC: 91 MMOL/L — LOW (ref 98–107)
CHLORIDE SERPL-SCNC: 94 MMOL/L — LOW (ref 98–107)
CHOLEST SERPL-MCNC: 133 MG/DL — SIGNIFICANT CHANGE UP
CO2 BLDA-SCNC: 27 MMOL/L — HIGH (ref 19–24)
CO2 BLDV-SCNC: 30.6 MMOL/L — HIGH (ref 22–26)
CO2 BLDV-SCNC: 31.4 MMOL/L — HIGH (ref 22–26)
CO2 SERPL-SCNC: 17 MMOL/L — LOW (ref 22–31)
CO2 SERPL-SCNC: 21 MMOL/L — LOW (ref 22–31)
CO2 SERPL-SCNC: 24 MMOL/L — SIGNIFICANT CHANGE UP (ref 22–31)
CO2 SERPL-SCNC: 25 MMOL/L — SIGNIFICANT CHANGE UP (ref 22–31)
CO2 SERPL-SCNC: 25 MMOL/L — SIGNIFICANT CHANGE UP (ref 22–31)
CO2 SERPL-SCNC: 28 MMOL/L — SIGNIFICANT CHANGE UP (ref 22–31)
CO2 SERPL-SCNC: 31 MMOL/L — SIGNIFICANT CHANGE UP (ref 22–31)
CO2 SERPL-SCNC: 32 MMOL/L — HIGH (ref 22–31)
CO2 SERPL-SCNC: 32 MMOL/L — HIGH (ref 22–31)
CO2 SERPL-SCNC: 34 MMOL/L — HIGH (ref 22–31)
COLOR FLD: YELLOW — SIGNIFICANT CHANGE UP
COLOR SPEC: YELLOW — SIGNIFICANT CHANGE UP
COLOR SPEC: YELLOW — SIGNIFICANT CHANGE UP
COMMENT - FLUIDS: SIGNIFICANT CHANGE UP
CREAT SERPL-MCNC: 0.9 MG/DL — SIGNIFICANT CHANGE UP (ref 0.5–1.3)
CREAT SERPL-MCNC: 0.9 MG/DL — SIGNIFICANT CHANGE UP (ref 0.5–1.3)
CREAT SERPL-MCNC: 0.94 MG/DL — SIGNIFICANT CHANGE UP (ref 0.5–1.3)
CREAT SERPL-MCNC: 1.29 MG/DL — SIGNIFICANT CHANGE UP (ref 0.5–1.3)
CREAT SERPL-MCNC: 1.34 MG/DL — HIGH (ref 0.5–1.3)
CREAT SERPL-MCNC: 1.44 MG/DL — HIGH (ref 0.5–1.3)
CREAT SERPL-MCNC: 1.59 MG/DL — HIGH (ref 0.5–1.3)
CREAT SERPL-MCNC: 1.72 MG/DL — HIGH (ref 0.5–1.3)
CREAT SERPL-MCNC: 1.74 MG/DL — HIGH (ref 0.5–1.3)
CREAT SERPL-MCNC: 1.86 MG/DL — HIGH (ref 0.5–1.3)
CREAT SERPL-MCNC: 1.91 MG/DL — HIGH (ref 0.5–1.3)
CREAT SERPL-MCNC: 2.29 MG/DL — HIGH (ref 0.5–1.3)
CULTURE RESULTS: SIGNIFICANT CHANGE UP
DIFF PNL FLD: NEGATIVE — SIGNIFICANT CHANGE UP
DIFF PNL FLD: NEGATIVE — SIGNIFICANT CHANGE UP
EGFR: 29 ML/MIN/1.73M2 — LOW
EGFR: 36 ML/MIN/1.73M2 — LOW
EGFR: 37 ML/MIN/1.73M2 — LOW
EGFR: 40 ML/MIN/1.73M2 — LOW
EGFR: 40 ML/MIN/1.73M2 — LOW
EGFR: 44 ML/MIN/1.73M2 — LOW
EGFR: 50 ML/MIN/1.73M2 — LOW
EGFR: 55 ML/MIN/1.73M2 — LOW
EGFR: 57 ML/MIN/1.73M2 — LOW
EGFR: 83 ML/MIN/1.73M2 — SIGNIFICANT CHANGE UP
EGFR: 88 ML/MIN/1.73M2 — SIGNIFICANT CHANGE UP
EGFR: 88 ML/MIN/1.73M2 — SIGNIFICANT CHANGE UP
EOSINOPHIL # BLD AUTO: 0 K/UL — SIGNIFICANT CHANGE UP (ref 0–0.5)
EOSINOPHIL NFR BLD AUTO: 0 % — SIGNIFICANT CHANGE UP (ref 0–6)
ESTIMATED AVERAGE GLUCOSE: 134 — SIGNIFICANT CHANGE UP
FLUAV AG NPH QL: SIGNIFICANT CHANGE UP
FLUAV SUBTYP SPEC NAA+PROBE: SIGNIFICANT CHANGE UP
FLUBV AG NPH QL: SIGNIFICANT CHANGE UP
FLUBV RNA SPEC QL NAA+PROBE: SIGNIFICANT CHANGE UP
FLUID INTAKE SUBSTANCE CLASS: SIGNIFICANT CHANGE UP
GAS PNL BLDV: 116 MMOL/L — CRITICAL LOW (ref 136–145)
GAS PNL BLDV: 118 MMOL/L — CRITICAL LOW (ref 136–145)
GAS PNL BLDV: SIGNIFICANT CHANGE UP
GAS PNL BLDV: SIGNIFICANT CHANGE UP
GLUCOSE BLDC GLUCOMTR-MCNC: 112 MG/DL — HIGH (ref 70–99)
GLUCOSE BLDC GLUCOMTR-MCNC: 138 MG/DL — HIGH (ref 70–99)
GLUCOSE BLDC GLUCOMTR-MCNC: 154 MG/DL — HIGH (ref 70–99)
GLUCOSE BLDC GLUCOMTR-MCNC: 164 MG/DL — HIGH (ref 70–99)
GLUCOSE BLDC GLUCOMTR-MCNC: 172 MG/DL — HIGH (ref 70–99)
GLUCOSE BLDC GLUCOMTR-MCNC: 202 MG/DL — HIGH (ref 70–99)
GLUCOSE BLDC GLUCOMTR-MCNC: 302 MG/DL — HIGH (ref 70–99)
GLUCOSE BLDV-MCNC: 141 MG/DL — HIGH (ref 70–99)
GLUCOSE BLDV-MCNC: 176 MG/DL — HIGH (ref 70–99)
GLUCOSE FLD-MCNC: 137 MG/DL — SIGNIFICANT CHANGE UP
GLUCOSE SERPL-MCNC: 104 MG/DL — HIGH (ref 70–99)
GLUCOSE SERPL-MCNC: 106 MG/DL — HIGH (ref 70–99)
GLUCOSE SERPL-MCNC: 109 MG/DL — HIGH (ref 70–99)
GLUCOSE SERPL-MCNC: 115 MG/DL — HIGH (ref 70–99)
GLUCOSE SERPL-MCNC: 117 MG/DL — HIGH (ref 70–99)
GLUCOSE SERPL-MCNC: 140 MG/DL — HIGH (ref 70–99)
GLUCOSE SERPL-MCNC: 149 MG/DL — HIGH (ref 70–99)
GLUCOSE SERPL-MCNC: 158 MG/DL — HIGH (ref 70–99)
GLUCOSE SERPL-MCNC: 165 MG/DL — HIGH (ref 70–99)
GLUCOSE SERPL-MCNC: 221 MG/DL — HIGH (ref 70–99)
GLUCOSE SERPL-MCNC: 82 MG/DL — SIGNIFICANT CHANGE UP (ref 70–99)
GLUCOSE SERPL-MCNC: 98 MG/DL — SIGNIFICANT CHANGE UP (ref 70–99)
GLUCOSE UR QL: NEGATIVE — SIGNIFICANT CHANGE UP
GLUCOSE UR QL: NEGATIVE — SIGNIFICANT CHANGE UP
GRAM STN FLD: SIGNIFICANT CHANGE UP
GRAM STN FLD: SIGNIFICANT CHANGE UP
HADV DNA SPEC QL NAA+PROBE: SIGNIFICANT CHANGE UP
HCO3 BLDA-SCNC: 25 MMOL/L — SIGNIFICANT CHANGE UP (ref 21–28)
HCO3 BLDV-SCNC: 28 MMOL/L — SIGNIFICANT CHANGE UP (ref 22–29)
HCO3 BLDV-SCNC: 29 MMOL/L — SIGNIFICANT CHANGE UP (ref 22–29)
HCOV 229E RNA SPEC QL NAA+PROBE: SIGNIFICANT CHANGE UP
HCOV HKU1 RNA SPEC QL NAA+PROBE: SIGNIFICANT CHANGE UP
HCOV NL63 RNA SPEC QL NAA+PROBE: SIGNIFICANT CHANGE UP
HCOV OC43 RNA SPEC QL NAA+PROBE: SIGNIFICANT CHANGE UP
HCT VFR BLD CALC: 34.4 % — LOW (ref 39–50)
HCT VFR BLD CALC: 36.1 % — LOW (ref 39–50)
HCT VFR BLD CALC: 36.5 % — LOW (ref 39–50)
HCT VFR BLD CALC: 37.2 % — LOW (ref 39–50)
HCT VFR BLD CALC: 37.5 % — LOW (ref 39–50)
HCT VFR BLD CALC: 38.8 % — LOW (ref 39–50)
HCT VFR BLD CALC: 39 % — SIGNIFICANT CHANGE UP (ref 39–50)
HCT VFR BLDA CALC: 33 % — LOW (ref 39–51)
HCT VFR BLDA CALC: 35 % — LOW (ref 39–51)
HDLC SERPL-MCNC: 34 MG/DL — LOW
HGB BLD CALC-MCNC: 11 G/DL — LOW (ref 13–17)
HGB BLD CALC-MCNC: 11.6 G/DL — LOW (ref 13–17)
HGB BLD-MCNC: 10.9 G/DL — LOW (ref 13–17)
HGB BLD-MCNC: 11.3 G/DL — LOW (ref 13–17)
HGB BLD-MCNC: 11.4 G/DL — LOW (ref 13–17)
HGB BLD-MCNC: 11.4 G/DL — LOW (ref 13–17)
HGB BLD-MCNC: 11.5 G/DL — LOW (ref 13–17)
HGB BLD-MCNC: 11.6 G/DL — LOW (ref 13–17)
HGB BLD-MCNC: 11.9 G/DL — LOW (ref 13–17)
HMPV RNA SPEC QL NAA+PROBE: SIGNIFICANT CHANGE UP
HPIV1 RNA SPEC QL NAA+PROBE: SIGNIFICANT CHANGE UP
HPIV2 RNA SPEC QL NAA+PROBE: SIGNIFICANT CHANGE UP
HPIV3 RNA SPEC QL NAA+PROBE: SIGNIFICANT CHANGE UP
HPIV4 RNA SPEC QL NAA+PROBE: SIGNIFICANT CHANGE UP
IANC: 16.54 K/UL — HIGH (ref 1.8–7.4)
IMM GRANULOCYTES NFR BLD AUTO: 2.2 % — HIGH (ref 0–0.9)
INR BLD: 1.16 RATIO — SIGNIFICANT CHANGE UP (ref 0.88–1.16)
KETONES UR-MCNC: NEGATIVE — SIGNIFICANT CHANGE UP
KETONES UR-MCNC: NEGATIVE — SIGNIFICANT CHANGE UP
LACTATE BLDV-MCNC: 1.3 MMOL/L — SIGNIFICANT CHANGE UP (ref 0.5–2)
LACTATE BLDV-MCNC: 1.9 MMOL/L — SIGNIFICANT CHANGE UP (ref 0.5–2)
LDH SERPL L TO P-CCNC: 321 U/L — SIGNIFICANT CHANGE UP
LDH SERPL L TO P-CCNC: 525 U/L — HIGH (ref 135–225)
LEGIONELLA AG UR QL: NEGATIVE — SIGNIFICANT CHANGE UP
LEUKOCYTE ESTERASE UR-ACNC: NEGATIVE — SIGNIFICANT CHANGE UP
LEUKOCYTE ESTERASE UR-ACNC: NEGATIVE — SIGNIFICANT CHANGE UP
LIPID PNL WITH DIRECT LDL SERPL: 81 MG/DL — SIGNIFICANT CHANGE UP
LYMPHOCYTES # BLD AUTO: 0.96 K/UL — LOW (ref 1–3.3)
LYMPHOCYTES # BLD AUTO: 4.7 % — LOW (ref 13–44)
LYMPHOCYTES # FLD: 25 % — SIGNIFICANT CHANGE UP
M PNEUMO DNA SPEC QL NAA+PROBE: SIGNIFICANT CHANGE UP
MAGNESIUM SERPL-MCNC: 2.2 MG/DL — SIGNIFICANT CHANGE UP (ref 1.6–2.6)
MAGNESIUM SERPL-MCNC: 2.4 MG/DL — SIGNIFICANT CHANGE UP (ref 1.6–2.6)
MAGNESIUM SERPL-MCNC: 2.7 MG/DL — HIGH (ref 1.6–2.6)
MAGNESIUM SERPL-MCNC: 2.7 MG/DL — HIGH (ref 1.6–2.6)
MAGNESIUM SERPL-MCNC: 2.9 MG/DL — HIGH (ref 1.6–2.6)
MAGNESIUM SERPL-MCNC: 2.9 MG/DL — HIGH (ref 1.6–2.6)
MAGNESIUM SERPL-MCNC: 3 MG/DL — HIGH (ref 1.6–2.6)
MCHC RBC-ENTMCNC: 24.4 PG — LOW (ref 27–34)
MCHC RBC-ENTMCNC: 24.7 PG — LOW (ref 27–34)
MCHC RBC-ENTMCNC: 24.7 PG — LOW (ref 27–34)
MCHC RBC-ENTMCNC: 25 PG — LOW (ref 27–34)
MCHC RBC-ENTMCNC: 25.1 PG — LOW (ref 27–34)
MCHC RBC-ENTMCNC: 29.9 GM/DL — LOW (ref 32–36)
MCHC RBC-ENTMCNC: 30.4 GM/DL — LOW (ref 32–36)
MCHC RBC-ENTMCNC: 30.5 GM/DL — LOW (ref 32–36)
MCHC RBC-ENTMCNC: 30.6 GM/DL — LOW (ref 32–36)
MCHC RBC-ENTMCNC: 31 GM/DL — LOW (ref 32–36)
MCHC RBC-ENTMCNC: 31.7 GM/DL — LOW (ref 32–36)
MCHC RBC-ENTMCNC: 31.9 GM/DL — LOW (ref 32–36)
MCV RBC AUTO: 78.6 FL — LOW (ref 80–100)
MCV RBC AUTO: 78.9 FL — LOW (ref 80–100)
MCV RBC AUTO: 79.7 FL — LOW (ref 80–100)
MCV RBC AUTO: 80.9 FL — SIGNIFICANT CHANGE UP (ref 80–100)
MCV RBC AUTO: 81.2 FL — SIGNIFICANT CHANGE UP (ref 80–100)
MCV RBC AUTO: 82.1 FL — SIGNIFICANT CHANGE UP (ref 80–100)
MCV RBC AUTO: 82.6 FL — SIGNIFICANT CHANGE UP (ref 80–100)
MONOCYTES # BLD AUTO: 2.33 K/UL — HIGH (ref 0–0.9)
MONOCYTES NFR BLD AUTO: 11.5 % — SIGNIFICANT CHANGE UP (ref 2–14)
MONOS+MACROS # FLD: 42 % — SIGNIFICANT CHANGE UP
MRSA PCR RESULT.: SIGNIFICANT CHANGE UP
NEUTROPHILS # BLD AUTO: 16.54 K/UL — HIGH (ref 1.8–7.4)
NEUTROPHILS NFR BLD AUTO: 81.4 % — HIGH (ref 43–77)
NEUTROPHILS-BODY FLUID: 30 % — SIGNIFICANT CHANGE UP
NIGHT BLUE STAIN TISS: SIGNIFICANT CHANGE UP
NITRITE UR-MCNC: NEGATIVE — SIGNIFICANT CHANGE UP
NITRITE UR-MCNC: NEGATIVE — SIGNIFICANT CHANGE UP
NON HDL CHOLESTEROL: 99 MG/DL — SIGNIFICANT CHANGE UP
NON-GYNECOLOGICAL CYTOLOGY STUDY: SIGNIFICANT CHANGE UP
NRBC # BLD: 0 /100 WBCS — SIGNIFICANT CHANGE UP (ref 0–0)
NRBC # FLD: 0 K/UL — SIGNIFICANT CHANGE UP (ref 0–0)
NRBC # FLD: 0.03 K/UL — HIGH (ref 0–0)
NRBC # FLD: 0.03 K/UL — HIGH (ref 0–0)
NRBC # FLD: 0.09 K/UL — HIGH (ref 0–0)
NRBC # FLD: 0.13 K/UL — HIGH (ref 0–0)
NRBC # FLD: 0.13 K/UL — HIGH (ref 0–0)
NRBC # FLD: 0.18 K/UL — HIGH (ref 0–0)
OSMOLALITY UR: 220 MOSM/KG — SIGNIFICANT CHANGE UP (ref 50–1200)
OTHER CELLS FLD MANUAL: 3 % — SIGNIFICANT CHANGE UP
PCO2 BLDA: 40 MMHG — SIGNIFICANT CHANGE UP (ref 35–48)
PCO2 BLDV: 70 MMHG — HIGH (ref 42–55)
PCO2 BLDV: 71 MMHG — HIGH (ref 42–55)
PH BLDA: 7.41 — SIGNIFICANT CHANGE UP (ref 7.35–7.45)
PH BLDV: 7.21 — LOW (ref 7.32–7.43)
PH BLDV: 7.23 — LOW (ref 7.32–7.43)
PH FLD: 7.5 — SIGNIFICANT CHANGE UP
PH UR: 5.5 — SIGNIFICANT CHANGE UP (ref 5–8)
PH UR: 6 — SIGNIFICANT CHANGE UP (ref 5–8)
PHOSPHATE SERPL-MCNC: 2.2 MG/DL — LOW (ref 2.5–4.5)
PHOSPHATE SERPL-MCNC: 2.5 MG/DL — SIGNIFICANT CHANGE UP (ref 2.5–4.5)
PHOSPHATE SERPL-MCNC: 2.8 MG/DL — SIGNIFICANT CHANGE UP (ref 2.5–4.5)
PHOSPHATE SERPL-MCNC: 3.8 MG/DL — SIGNIFICANT CHANGE UP (ref 2.5–4.5)
PHOSPHATE SERPL-MCNC: 4.3 MG/DL — SIGNIFICANT CHANGE UP (ref 2.5–4.5)
PHOSPHATE SERPL-MCNC: 4.5 MG/DL — SIGNIFICANT CHANGE UP (ref 2.5–4.5)
PHOSPHATE SERPL-MCNC: 4.9 MG/DL — HIGH (ref 2.5–4.5)
PHOSPHATE SERPL-MCNC: 5 MG/DL — HIGH (ref 2.5–4.5)
PHOSPHATE SERPL-MCNC: 5 MG/DL — HIGH (ref 2.5–4.5)
PLATELET # BLD AUTO: 130 K/UL — LOW (ref 150–400)
PLATELET # BLD AUTO: 157 K/UL — SIGNIFICANT CHANGE UP (ref 150–400)
PLATELET # BLD AUTO: 157 K/UL — SIGNIFICANT CHANGE UP (ref 150–400)
PLATELET # BLD AUTO: 158 K/UL — SIGNIFICANT CHANGE UP (ref 150–400)
PLATELET # BLD AUTO: 166 K/UL — SIGNIFICANT CHANGE UP (ref 150–400)
PLATELET # BLD AUTO: 168 K/UL — SIGNIFICANT CHANGE UP (ref 150–400)
PLATELET # BLD AUTO: 196 K/UL — SIGNIFICANT CHANGE UP (ref 150–400)
PO2 BLDA: 60 MMHG — LOW (ref 83–108)
PO2 BLDV: 44 MMHG — SIGNIFICANT CHANGE UP
PO2 BLDV: 45 MMHG — SIGNIFICANT CHANGE UP
POTASSIUM BLDV-SCNC: 5.6 MMOL/L — HIGH (ref 3.5–5.1)
POTASSIUM BLDV-SCNC: 5.6 MMOL/L — HIGH (ref 3.5–5.1)
POTASSIUM SERPL-MCNC: 4.4 MMOL/L — SIGNIFICANT CHANGE UP (ref 3.5–5.3)
POTASSIUM SERPL-MCNC: 4.4 MMOL/L — SIGNIFICANT CHANGE UP (ref 3.5–5.3)
POTASSIUM SERPL-MCNC: 4.9 MMOL/L — SIGNIFICANT CHANGE UP (ref 3.5–5.3)
POTASSIUM SERPL-MCNC: 5 MMOL/L — SIGNIFICANT CHANGE UP (ref 3.5–5.3)
POTASSIUM SERPL-MCNC: 5.3 MMOL/L — SIGNIFICANT CHANGE UP (ref 3.5–5.3)
POTASSIUM SERPL-MCNC: 5.4 MMOL/L — HIGH (ref 3.5–5.3)
POTASSIUM SERPL-MCNC: 5.6 MMOL/L — HIGH (ref 3.5–5.3)
POTASSIUM SERPL-MCNC: 5.7 MMOL/L — HIGH (ref 3.5–5.3)
POTASSIUM SERPL-MCNC: 5.9 MMOL/L — HIGH (ref 3.5–5.3)
POTASSIUM SERPL-MCNC: 6 MMOL/L — HIGH (ref 3.5–5.3)
POTASSIUM SERPL-MCNC: 6.4 MMOL/L — CRITICAL HIGH (ref 3.5–5.3)
POTASSIUM SERPL-MCNC: 6.9 MMOL/L — CRITICAL HIGH (ref 3.5–5.3)
POTASSIUM SERPL-SCNC: 4.4 MMOL/L — SIGNIFICANT CHANGE UP (ref 3.5–5.3)
POTASSIUM SERPL-SCNC: 4.4 MMOL/L — SIGNIFICANT CHANGE UP (ref 3.5–5.3)
POTASSIUM SERPL-SCNC: 4.9 MMOL/L — SIGNIFICANT CHANGE UP (ref 3.5–5.3)
POTASSIUM SERPL-SCNC: 5 MMOL/L — SIGNIFICANT CHANGE UP (ref 3.5–5.3)
POTASSIUM SERPL-SCNC: 5.3 MMOL/L — SIGNIFICANT CHANGE UP (ref 3.5–5.3)
POTASSIUM SERPL-SCNC: 5.4 MMOL/L — HIGH (ref 3.5–5.3)
POTASSIUM SERPL-SCNC: 5.6 MMOL/L — HIGH (ref 3.5–5.3)
POTASSIUM SERPL-SCNC: 5.7 MMOL/L — HIGH (ref 3.5–5.3)
POTASSIUM SERPL-SCNC: 5.9 MMOL/L — HIGH (ref 3.5–5.3)
POTASSIUM SERPL-SCNC: 6 MMOL/L — HIGH (ref 3.5–5.3)
POTASSIUM SERPL-SCNC: 6.4 MMOL/L — CRITICAL HIGH (ref 3.5–5.3)
POTASSIUM SERPL-SCNC: 6.9 MMOL/L — CRITICAL HIGH (ref 3.5–5.3)
PROCALCITONIN SERPL-MCNC: 0.17 NG/ML — HIGH (ref 0.02–0.1)
PROT FLD-MCNC: 4.2 G/DL — SIGNIFICANT CHANGE UP
PROT SERPL-MCNC: 7.6 G/DL — SIGNIFICANT CHANGE UP (ref 6–8.3)
PROT UR-MCNC: ABNORMAL
PROT UR-MCNC: ABNORMAL
PROTHROM AB SERPL-ACNC: 13.5 SEC — HIGH (ref 10.5–13.4)
RAPID RVP RESULT: SIGNIFICANT CHANGE UP
RBC # BLD: 4.36 M/UL — SIGNIFICANT CHANGE UP (ref 4.2–5.8)
RBC # BLD: 4.51 M/UL — SIGNIFICANT CHANGE UP (ref 4.2–5.8)
RBC # BLD: 4.59 M/UL — SIGNIFICANT CHANGE UP (ref 4.2–5.8)
RBC # BLD: 4.62 M/UL — SIGNIFICANT CHANGE UP (ref 4.2–5.8)
RBC # BLD: 4.67 M/UL — SIGNIFICANT CHANGE UP (ref 4.2–5.8)
RBC # BLD: 4.7 M/UL — SIGNIFICANT CHANGE UP (ref 4.2–5.8)
RBC # BLD: 4.75 M/UL — SIGNIFICANT CHANGE UP (ref 4.2–5.8)
RBC # FLD: 15.3 % — HIGH (ref 10.3–14.5)
RBC # FLD: 15.3 % — HIGH (ref 10.3–14.5)
RBC # FLD: 15.5 % — HIGH (ref 10.3–14.5)
RBC # FLD: 15.6 % — HIGH (ref 10.3–14.5)
RBC # FLD: 15.6 % — HIGH (ref 10.3–14.5)
RBC # FLD: 15.8 % — HIGH (ref 10.3–14.5)
RBC # FLD: 15.9 % — HIGH (ref 10.3–14.5)
RCV VOL RI: 7000 CELLS/UL — HIGH (ref 0–5)
RSV RNA NPH QL NAA+NON-PROBE: SIGNIFICANT CHANGE UP
RSV RNA SPEC QL NAA+PROBE: SIGNIFICANT CHANGE UP
RV+EV RNA SPEC QL NAA+PROBE: SIGNIFICANT CHANGE UP
S AUREUS DNA NOSE QL NAA+PROBE: SIGNIFICANT CHANGE UP
SAO2 % BLDA: 92.9 % — LOW (ref 94–98)
SAO2 % BLDV: 63.9 % — SIGNIFICANT CHANGE UP
SAO2 % BLDV: 68.1 % — SIGNIFICANT CHANGE UP
SARS-COV-2 RNA SPEC QL NAA+PROBE: SIGNIFICANT CHANGE UP
SODIUM SERPL-SCNC: 116 MMOL/L — CRITICAL LOW (ref 135–145)
SODIUM SERPL-SCNC: 118 MMOL/L — CRITICAL LOW (ref 135–145)
SODIUM SERPL-SCNC: 119 MMOL/L — CRITICAL LOW (ref 135–145)
SODIUM SERPL-SCNC: 120 MMOL/L — CRITICAL LOW (ref 135–145)
SODIUM SERPL-SCNC: 122 MMOL/L — LOW (ref 135–145)
SODIUM SERPL-SCNC: 122 MMOL/L — LOW (ref 135–145)
SODIUM SERPL-SCNC: 128 MMOL/L — LOW (ref 135–145)
SODIUM SERPL-SCNC: 129 MMOL/L — LOW (ref 135–145)
SODIUM SERPL-SCNC: 133 MMOL/L — LOW (ref 135–145)
SODIUM SERPL-SCNC: 134 MMOL/L — LOW (ref 135–145)
SODIUM SERPL-SCNC: 135 MMOL/L — SIGNIFICANT CHANGE UP (ref 135–145)
SODIUM SERPL-SCNC: 137 MMOL/L — SIGNIFICANT CHANGE UP (ref 135–145)
SODIUM UR-SCNC: <20 MMOL/L — SIGNIFICANT CHANGE UP
SP GR SPEC: 1.01 — SIGNIFICANT CHANGE UP (ref 1.01–1.05)
SP GR SPEC: 1.02 — SIGNIFICANT CHANGE UP (ref 1.01–1.05)
SPECIMEN SOURCE: SIGNIFICANT CHANGE UP
TM INTERPRETATION: SIGNIFICANT CHANGE UP
TOTAL NUCLEATED CELL COUNT, BODY FLUID: 815 CELLS/UL — HIGH (ref 0–5)
TRIGL SERPL-MCNC: 89 MG/DL — SIGNIFICANT CHANGE UP
TROPONIN T, HIGH SENSITIVITY RESULT: 1292 NG/L — CRITICAL HIGH
TUBE TYPE: SIGNIFICANT CHANGE UP
UROBILINOGEN FLD QL: SIGNIFICANT CHANGE UP
UROBILINOGEN FLD QL: SIGNIFICANT CHANGE UP
WBC # BLD: 15.62 K/UL — HIGH (ref 3.8–10.5)
WBC # BLD: 15.75 K/UL — HIGH (ref 3.8–10.5)
WBC # BLD: 15.9 K/UL — HIGH (ref 3.8–10.5)
WBC # BLD: 18.93 K/UL — HIGH (ref 3.8–10.5)
WBC # BLD: 20.16 K/UL — HIGH (ref 3.8–10.5)
WBC # BLD: 20.31 K/UL — HIGH (ref 3.8–10.5)
WBC # BLD: 23.46 K/UL — HIGH (ref 3.8–10.5)
WBC # FLD AUTO: 15.62 K/UL — HIGH (ref 3.8–10.5)
WBC # FLD AUTO: 15.75 K/UL — HIGH (ref 3.8–10.5)
WBC # FLD AUTO: 15.9 K/UL — HIGH (ref 3.8–10.5)
WBC # FLD AUTO: 18.93 K/UL — HIGH (ref 3.8–10.5)
WBC # FLD AUTO: 20.16 K/UL — HIGH (ref 3.8–10.5)
WBC # FLD AUTO: 20.31 K/UL — HIGH (ref 3.8–10.5)
WBC # FLD AUTO: 23.46 K/UL — HIGH (ref 3.8–10.5)

## 2022-01-01 PROCEDURE — 99223 1ST HOSP IP/OBS HIGH 75: CPT

## 2022-01-01 PROCEDURE — 88342 IMHCHEM/IMCYTCHM 1ST ANTB: CPT | Mod: 26

## 2022-01-01 PROCEDURE — 88305 TISSUE EXAM BY PATHOLOGIST: CPT | Mod: 26

## 2022-01-01 PROCEDURE — 99291 CRITICAL CARE FIRST HOUR: CPT | Mod: 25

## 2022-01-01 PROCEDURE — 99233 SBSQ HOSP IP/OBS HIGH 50: CPT | Mod: GC

## 2022-01-01 PROCEDURE — 99232 SBSQ HOSP IP/OBS MODERATE 35: CPT

## 2022-01-01 PROCEDURE — 71045 X-RAY EXAM CHEST 1 VIEW: CPT | Mod: 26

## 2022-01-01 PROCEDURE — 88112 CYTOPATH CELL ENHANCE TECH: CPT | Mod: 26

## 2022-01-01 PROCEDURE — 32555 ASPIRATE PLEURA W/ IMAGING: CPT | Mod: GC

## 2022-01-01 PROCEDURE — 88341 IMHCHEM/IMCYTCHM EA ADD ANTB: CPT | Mod: 26

## 2022-01-01 PROCEDURE — 93306 TTE W/DOPPLER COMPLETE: CPT | Mod: 26

## 2022-01-01 PROCEDURE — 88108 CYTOPATH CONCENTRATE TECH: CPT | Mod: 26,59

## 2022-01-01 PROCEDURE — 99223 1ST HOSP IP/OBS HIGH 75: CPT | Mod: GC,25

## 2022-01-01 PROCEDURE — 70450 CT HEAD/BRAIN W/O DYE: CPT | Mod: 26

## 2022-01-01 PROCEDURE — 71250 CT THORAX DX C-: CPT | Mod: 26

## 2022-01-01 PROCEDURE — 93010 ELECTROCARDIOGRAM REPORT: CPT

## 2022-01-01 PROCEDURE — 74230 X-RAY XM SWLNG FUNCJ C+: CPT | Mod: 26

## 2022-01-01 PROCEDURE — 88189 FLOWCYTOMETRY/READ 16 & >: CPT

## 2022-01-01 PROCEDURE — 99233 SBSQ HOSP IP/OBS HIGH 50: CPT

## 2022-01-01 PROCEDURE — 74177 CT ABD & PELVIS W/CONTRAST: CPT | Mod: 26

## 2022-01-01 PROCEDURE — 99497 ADVNCD CARE PLAN 30 MIN: CPT | Mod: 25

## 2022-01-01 PROCEDURE — 74019 RADEX ABDOMEN 2 VIEWS: CPT | Mod: 26

## 2022-01-01 RX ORDER — ASPIRIN/CALCIUM CARB/MAGNESIUM 324 MG
81 TABLET ORAL DAILY
Refills: 0 | Status: DISCONTINUED | OUTPATIENT
Start: 2022-01-01 | End: 2022-01-01

## 2022-01-01 RX ORDER — NICOTINE POLACRILEX 2 MG
2 GUM BUCCAL
Refills: 0 | Status: DISCONTINUED | OUTPATIENT
Start: 2022-01-01 | End: 2022-01-01

## 2022-01-01 RX ORDER — NIFEDIPINE 30 MG
1 TABLET, EXTENDED RELEASE 24 HR ORAL
Qty: 0 | Refills: 0 | DISCHARGE

## 2022-01-01 RX ORDER — ALBUTEROL 90 UG/1
0 AEROSOL, METERED ORAL
Qty: 0 | Refills: 5 | DISCHARGE

## 2022-01-01 RX ORDER — LANOLIN ALCOHOL/MO/W.PET/CERES
3 CREAM (GRAM) TOPICAL AT BEDTIME
Refills: 0 | Status: DISCONTINUED | OUTPATIENT
Start: 2022-01-01 | End: 2022-01-01

## 2022-01-01 RX ORDER — FAMOTIDINE 10 MG/ML
1 INJECTION INTRAVENOUS
Qty: 0 | Refills: 0 | DISCHARGE

## 2022-01-01 RX ORDER — FUROSEMIDE 40 MG
40 TABLET ORAL ONCE
Refills: 0 | Status: DISCONTINUED | OUTPATIENT
Start: 2022-01-01 | End: 2022-01-01

## 2022-01-01 RX ORDER — ACETAMINOPHEN 500 MG
1000 TABLET ORAL ONCE
Refills: 0 | Status: COMPLETED | OUTPATIENT
Start: 2022-01-01 | End: 2022-01-01

## 2022-01-01 RX ORDER — POLYETHYLENE GLYCOL 3350 17 G/17G
17 POWDER, FOR SOLUTION ORAL DAILY
Refills: 0 | Status: DISCONTINUED | OUTPATIENT
Start: 2022-01-01 | End: 2022-01-01

## 2022-01-01 RX ORDER — AZITHROMYCIN 500 MG/1
500 TABLET, FILM COATED ORAL DAILY
Refills: 0 | Status: COMPLETED | OUTPATIENT
Start: 2022-01-01 | End: 2022-01-01

## 2022-01-01 RX ORDER — FUROSEMIDE 40 MG
20 TABLET ORAL ONCE
Refills: 0 | Status: COMPLETED | OUTPATIENT
Start: 2022-01-01 | End: 2022-01-01

## 2022-01-01 RX ORDER — METOPROLOL TARTRATE 50 MG
25 TABLET ORAL
Refills: 0 | Status: DISCONTINUED | OUTPATIENT
Start: 2022-01-01 | End: 2022-01-01

## 2022-01-01 RX ORDER — SODIUM CHLORIDE 9 MG/ML
4 INJECTION INTRAMUSCULAR; INTRAVENOUS; SUBCUTANEOUS EVERY 6 HOURS
Refills: 0 | Status: DISCONTINUED | OUTPATIENT
Start: 2022-01-01 | End: 2022-01-01

## 2022-01-01 RX ORDER — NIFEDIPINE 30 MG
60 TABLET, EXTENDED RELEASE 24 HR ORAL DAILY
Refills: 0 | Status: DISCONTINUED | OUTPATIENT
Start: 2022-01-01 | End: 2022-01-01

## 2022-01-01 RX ORDER — HEPARIN SODIUM 5000 [USP'U]/ML
5000 INJECTION INTRAVENOUS; SUBCUTANEOUS EVERY 8 HOURS
Refills: 0 | Status: DISCONTINUED | OUTPATIENT
Start: 2022-01-01 | End: 2022-01-01

## 2022-01-01 RX ORDER — CEFTRIAXONE 500 MG/1
1000 INJECTION, POWDER, FOR SOLUTION INTRAMUSCULAR; INTRAVENOUS ONCE
Refills: 0 | Status: COMPLETED | OUTPATIENT
Start: 2022-01-01 | End: 2022-01-01

## 2022-01-01 RX ORDER — LIDOCAINE 4 G/100G
1 CREAM TOPICAL DAILY
Refills: 0 | Status: DISCONTINUED | OUTPATIENT
Start: 2022-01-01 | End: 2022-01-01

## 2022-01-01 RX ORDER — NIFEDIPINE 30 MG
30 TABLET, EXTENDED RELEASE 24 HR ORAL DAILY
Refills: 0 | Status: DISCONTINUED | OUTPATIENT
Start: 2022-01-01 | End: 2022-01-01

## 2022-01-01 RX ORDER — SODIUM CHLORIDE 9 MG/ML
500 INJECTION INTRAMUSCULAR; INTRAVENOUS; SUBCUTANEOUS
Refills: 0 | Status: DISCONTINUED | OUTPATIENT
Start: 2022-01-01 | End: 2022-01-01

## 2022-01-01 RX ORDER — BUDESONIDE AND FORMOTEROL FUMARATE DIHYDRATE 160; 4.5 UG/1; UG/1
2 AEROSOL RESPIRATORY (INHALATION)
Refills: 0 | Status: DISCONTINUED | OUTPATIENT
Start: 2022-01-01 | End: 2022-01-01

## 2022-01-01 RX ORDER — CEFTRIAXONE 500 MG/1
1000 INJECTION, POWDER, FOR SOLUTION INTRAMUSCULAR; INTRAVENOUS EVERY 24 HOURS
Refills: 0 | Status: COMPLETED | OUTPATIENT
Start: 2022-01-01 | End: 2022-01-01

## 2022-01-01 RX ORDER — PANTOPRAZOLE SODIUM 20 MG/1
1 TABLET, DELAYED RELEASE ORAL
Qty: 0 | Refills: 0 | DISCHARGE

## 2022-01-01 RX ORDER — SODIUM ZIRCONIUM CYCLOSILICATE 10 G/10G
10 POWDER, FOR SUSPENSION ORAL ONCE
Refills: 0 | Status: COMPLETED | OUTPATIENT
Start: 2022-01-01 | End: 2022-01-01

## 2022-01-01 RX ORDER — INSULIN HUMAN 100 [IU]/ML
5 INJECTION, SOLUTION SUBCUTANEOUS ONCE
Refills: 0 | Status: COMPLETED | OUTPATIENT
Start: 2022-01-01 | End: 2022-01-01

## 2022-01-01 RX ORDER — NICOTINE POLACRILEX 2 MG
1 GUM BUCCAL DAILY
Refills: 0 | Status: DISCONTINUED | OUTPATIENT
Start: 2022-01-01 | End: 2022-01-01

## 2022-01-01 RX ORDER — SODIUM CHLORIDE 9 MG/ML
1000 INJECTION INTRAMUSCULAR; INTRAVENOUS; SUBCUTANEOUS ONCE
Refills: 0 | Status: COMPLETED | OUTPATIENT
Start: 2022-01-01 | End: 2022-01-01

## 2022-01-01 RX ORDER — IPRATROPIUM/ALBUTEROL SULFATE 18-103MCG
3 AEROSOL WITH ADAPTER (GRAM) INHALATION ONCE
Refills: 0 | Status: COMPLETED | OUTPATIENT
Start: 2022-01-01 | End: 2022-01-01

## 2022-01-01 RX ORDER — IPRATROPIUM/ALBUTEROL SULFATE 18-103MCG
3 AEROSOL WITH ADAPTER (GRAM) INHALATION EVERY 6 HOURS
Refills: 0 | Status: DISCONTINUED | OUTPATIENT
Start: 2022-01-01 | End: 2022-01-01

## 2022-01-01 RX ORDER — FUROSEMIDE 40 MG
20 TABLET ORAL DAILY
Refills: 0 | Status: DISCONTINUED | OUTPATIENT
Start: 2022-01-01 | End: 2022-01-01

## 2022-01-01 RX ORDER — HEPARIN SODIUM 5000 [USP'U]/ML
500 INJECTION INTRAVENOUS; SUBCUTANEOUS EVERY 8 HOURS
Refills: 0 | Status: DISCONTINUED | OUTPATIENT
Start: 2022-01-01 | End: 2022-01-01

## 2022-01-01 RX ORDER — SODIUM CHLORIDE 9 MG/ML
1000 INJECTION INTRAMUSCULAR; INTRAVENOUS; SUBCUTANEOUS
Refills: 0 | Status: DISCONTINUED | OUTPATIENT
Start: 2022-01-01 | End: 2022-01-01

## 2022-01-01 RX ORDER — SODIUM ZIRCONIUM CYCLOSILICATE 10 G/10G
10 POWDER, FOR SUSPENSION ORAL EVERY 8 HOURS
Refills: 0 | Status: DISCONTINUED | OUTPATIENT
Start: 2022-01-01 | End: 2022-01-01

## 2022-01-01 RX ORDER — MORPHINE SULFATE 50 MG/1
2 CAPSULE, EXTENDED RELEASE ORAL
Refills: 0 | Status: DISCONTINUED | OUTPATIENT
Start: 2022-01-01 | End: 2022-01-01

## 2022-01-01 RX ORDER — DEXTROSE 50 % IN WATER 50 %
50 SYRINGE (ML) INTRAVENOUS ONCE
Refills: 0 | Status: COMPLETED | OUTPATIENT
Start: 2022-01-01 | End: 2022-01-01

## 2022-01-01 RX ORDER — SODIUM ZIRCONIUM CYCLOSILICATE 10 G/10G
10 POWDER, FOR SUSPENSION ORAL EVERY 8 HOURS
Refills: 0 | Status: COMPLETED | OUTPATIENT
Start: 2022-01-01 | End: 2022-01-01

## 2022-01-01 RX ORDER — VANCOMYCIN HCL 1 G
1000 VIAL (EA) INTRAVENOUS ONCE
Refills: 0 | Status: DISCONTINUED | OUTPATIENT
Start: 2022-01-01 | End: 2022-01-01

## 2022-01-01 RX ORDER — DILTIAZEM HCL 120 MG
1 CAPSULE, EXT RELEASE 24 HR ORAL
Qty: 0 | Refills: 0 | DISCHARGE

## 2022-01-01 RX ORDER — NIFEDIPINE 30 MG
90 TABLET, EXTENDED RELEASE 24 HR ORAL DAILY
Refills: 0 | Status: DISCONTINUED | OUTPATIENT
Start: 2022-01-01 | End: 2022-01-01

## 2022-01-01 RX ORDER — ACETAMINOPHEN 500 MG
650 TABLET ORAL EVERY 6 HOURS
Refills: 0 | Status: DISCONTINUED | OUTPATIENT
Start: 2022-01-01 | End: 2022-01-01

## 2022-01-01 RX ORDER — INFLUENZA VIRUS VACCINE 15; 15; 15; 15 UG/.5ML; UG/.5ML; UG/.5ML; UG/.5ML
0.7 SUSPENSION INTRAMUSCULAR ONCE
Refills: 0 | Status: DISCONTINUED | OUTPATIENT
Start: 2022-01-01 | End: 2022-01-01

## 2022-01-01 RX ORDER — ROBINUL 0.2 MG/ML
0.4 INJECTION INTRAMUSCULAR; INTRAVENOUS EVERY 6 HOURS
Refills: 0 | Status: DISCONTINUED | OUTPATIENT
Start: 2022-01-01 | End: 2022-01-01

## 2022-01-01 RX ORDER — SODIUM POLYSTYRENE SULFONATE 4.1 MEQ/G
30 POWDER, FOR SUSPENSION ORAL ONCE
Refills: 0 | Status: COMPLETED | OUTPATIENT
Start: 2022-01-01 | End: 2022-01-01

## 2022-01-01 RX ORDER — PANTOPRAZOLE SODIUM 20 MG/1
40 TABLET, DELAYED RELEASE ORAL
Refills: 0 | Status: DISCONTINUED | OUTPATIENT
Start: 2022-01-01 | End: 2022-01-01

## 2022-01-01 RX ORDER — SODIUM CHLORIDE 9 MG/ML
250 INJECTION, SOLUTION INTRAVENOUS ONCE
Refills: 0 | Status: COMPLETED | OUTPATIENT
Start: 2022-01-01 | End: 2022-01-01

## 2022-01-01 RX ORDER — FUROSEMIDE 40 MG
20 TABLET ORAL ONCE
Refills: 0 | Status: DISCONTINUED | OUTPATIENT
Start: 2022-01-01 | End: 2022-01-01

## 2022-01-01 RX ORDER — AZITHROMYCIN 500 MG/1
500 TABLET, FILM COATED ORAL ONCE
Refills: 0 | Status: COMPLETED | OUTPATIENT
Start: 2022-01-01 | End: 2022-01-01

## 2022-01-01 RX ORDER — ATORVASTATIN CALCIUM 80 MG/1
20 TABLET, FILM COATED ORAL AT BEDTIME
Refills: 0 | Status: DISCONTINUED | OUTPATIENT
Start: 2022-01-01 | End: 2022-01-01

## 2022-01-01 RX ORDER — CEFTRIAXONE 500 MG/1
1000 INJECTION, POWDER, FOR SOLUTION INTRAMUSCULAR; INTRAVENOUS EVERY 24 HOURS
Refills: 0 | Status: DISCONTINUED | OUTPATIENT
Start: 2022-01-01 | End: 2022-01-01

## 2022-01-01 RX ORDER — MORPHINE SULFATE 50 MG/1
2 CAPSULE, EXTENDED RELEASE ORAL ONCE
Refills: 0 | Status: DISCONTINUED | OUTPATIENT
Start: 2022-01-01 | End: 2022-01-01

## 2022-01-01 RX ORDER — SENNA PLUS 8.6 MG/1
1 TABLET ORAL
Refills: 0 | Status: DISCONTINUED | OUTPATIENT
Start: 2022-01-01 | End: 2022-01-01

## 2022-01-01 RX ORDER — PIPERACILLIN AND TAZOBACTAM 4; .5 G/20ML; G/20ML
3.38 INJECTION, POWDER, LYOPHILIZED, FOR SOLUTION INTRAVENOUS ONCE
Refills: 0 | Status: DISCONTINUED | OUTPATIENT
Start: 2022-01-01 | End: 2022-01-01

## 2022-01-01 RX ORDER — GUAIFENESIN/DEXTROMETHORPHAN 600MG-30MG
10 TABLET, EXTENDED RELEASE 12 HR ORAL EVERY 4 HOURS
Refills: 0 | Status: DISCONTINUED | OUTPATIENT
Start: 2022-01-01 | End: 2022-01-01

## 2022-01-01 RX ORDER — FUROSEMIDE 40 MG
40 TABLET ORAL ONCE
Refills: 0 | Status: COMPLETED | OUTPATIENT
Start: 2022-01-01 | End: 2022-01-01

## 2022-01-01 RX ORDER — ONDANSETRON 8 MG/1
4 TABLET, FILM COATED ORAL EVERY 8 HOURS
Refills: 0 | Status: DISCONTINUED | OUTPATIENT
Start: 2022-01-01 | End: 2022-01-01

## 2022-01-01 RX ADMIN — Medication 3 MILLILITER(S): at 15:41

## 2022-01-01 RX ADMIN — ATORVASTATIN CALCIUM 20 MILLIGRAM(S): 80 TABLET, FILM COATED ORAL at 21:14

## 2022-01-01 RX ADMIN — Medication 3 MILLILITER(S): at 03:58

## 2022-01-01 RX ADMIN — BUDESONIDE AND FORMOTEROL FUMARATE DIHYDRATE 2 PUFF(S): 160; 4.5 AEROSOL RESPIRATORY (INHALATION) at 21:52

## 2022-01-01 RX ADMIN — Medication 25 MILLIGRAM(S): at 05:28

## 2022-01-01 RX ADMIN — Medication 3 MILLILITER(S): at 09:49

## 2022-01-01 RX ADMIN — SODIUM CHLORIDE 4 MILLILITER(S): 9 INJECTION INTRAMUSCULAR; INTRAVENOUS; SUBCUTANEOUS at 09:08

## 2022-01-01 RX ADMIN — POLYETHYLENE GLYCOL 3350 17 GRAM(S): 17 POWDER, FOR SOLUTION ORAL at 12:57

## 2022-01-01 RX ADMIN — SODIUM CHLORIDE 4 MILLILITER(S): 9 INJECTION INTRAMUSCULAR; INTRAVENOUS; SUBCUTANEOUS at 04:55

## 2022-01-01 RX ADMIN — Medication 81 MILLIGRAM(S): at 11:09

## 2022-01-01 RX ADMIN — Medication 3 MILLILITER(S): at 22:02

## 2022-01-01 RX ADMIN — POLYETHYLENE GLYCOL 3350 17 GRAM(S): 17 POWDER, FOR SOLUTION ORAL at 12:03

## 2022-01-01 RX ADMIN — SENNA PLUS 1 TABLET(S): 8.6 TABLET ORAL at 18:15

## 2022-01-01 RX ADMIN — SENNA PLUS 1 TABLET(S): 8.6 TABLET ORAL at 05:26

## 2022-01-01 RX ADMIN — Medication 25 MILLIGRAM(S): at 18:14

## 2022-01-01 RX ADMIN — HEPARIN SODIUM 5000 UNIT(S): 5000 INJECTION INTRAVENOUS; SUBCUTANEOUS at 06:34

## 2022-01-01 RX ADMIN — Medication 81 MILLIGRAM(S): at 12:20

## 2022-01-01 RX ADMIN — SENNA PLUS 1 TABLET(S): 8.6 TABLET ORAL at 18:03

## 2022-01-01 RX ADMIN — HEPARIN SODIUM 5000 UNIT(S): 5000 INJECTION INTRAVENOUS; SUBCUTANEOUS at 13:39

## 2022-01-01 RX ADMIN — SODIUM POLYSTYRENE SULFONATE 30 GRAM(S): 4.1 POWDER, FOR SUSPENSION ORAL at 15:49

## 2022-01-01 RX ADMIN — Medication 100 MILLIGRAM(S): at 23:34

## 2022-01-01 RX ADMIN — Medication 3 MILLILITER(S): at 21:19

## 2022-01-01 RX ADMIN — SENNA PLUS 1 TABLET(S): 8.6 TABLET ORAL at 05:38

## 2022-01-01 RX ADMIN — Medication 1 PATCH: at 17:38

## 2022-01-01 RX ADMIN — PANTOPRAZOLE SODIUM 40 MILLIGRAM(S): 20 TABLET, DELAYED RELEASE ORAL at 09:52

## 2022-01-01 RX ADMIN — MORPHINE SULFATE 2 MILLIGRAM(S): 50 CAPSULE, EXTENDED RELEASE ORAL at 14:51

## 2022-01-01 RX ADMIN — SODIUM CHLORIDE 4 MILLILITER(S): 9 INJECTION INTRAMUSCULAR; INTRAVENOUS; SUBCUTANEOUS at 23:12

## 2022-01-01 RX ADMIN — HEPARIN SODIUM 5000 UNIT(S): 5000 INJECTION INTRAVENOUS; SUBCUTANEOUS at 21:28

## 2022-01-01 RX ADMIN — Medication 3 MILLILITER(S): at 10:00

## 2022-01-01 RX ADMIN — CEFTRIAXONE 100 MILLIGRAM(S): 500 INJECTION, POWDER, FOR SOLUTION INTRAMUSCULAR; INTRAVENOUS at 22:17

## 2022-01-01 RX ADMIN — POLYETHYLENE GLYCOL 3350 17 GRAM(S): 17 POWDER, FOR SOLUTION ORAL at 13:41

## 2022-01-01 RX ADMIN — BUDESONIDE AND FORMOTEROL FUMARATE DIHYDRATE 2 PUFF(S): 160; 4.5 AEROSOL RESPIRATORY (INHALATION) at 23:51

## 2022-01-01 RX ADMIN — Medication 81 MILLIGRAM(S): at 12:23

## 2022-01-01 RX ADMIN — HEPARIN SODIUM 5000 UNIT(S): 5000 INJECTION INTRAVENOUS; SUBCUTANEOUS at 21:46

## 2022-01-01 RX ADMIN — BUDESONIDE AND FORMOTEROL FUMARATE DIHYDRATE 2 PUFF(S): 160; 4.5 AEROSOL RESPIRATORY (INHALATION) at 09:40

## 2022-01-01 RX ADMIN — HEPARIN SODIUM 5000 UNIT(S): 5000 INJECTION INTRAVENOUS; SUBCUTANEOUS at 13:34

## 2022-01-01 RX ADMIN — SENNA PLUS 1 TABLET(S): 8.6 TABLET ORAL at 18:02

## 2022-01-01 RX ADMIN — Medication 3 MILLILITER(S): at 15:30

## 2022-01-01 RX ADMIN — Medication 25 MILLIGRAM(S): at 18:03

## 2022-01-01 RX ADMIN — HEPARIN SODIUM 5000 UNIT(S): 5000 INJECTION INTRAVENOUS; SUBCUTANEOUS at 15:11

## 2022-01-01 RX ADMIN — SODIUM CHLORIDE 4 MILLILITER(S): 9 INJECTION INTRAMUSCULAR; INTRAVENOUS; SUBCUTANEOUS at 22:58

## 2022-01-01 RX ADMIN — SODIUM CHLORIDE 50 MILLILITER(S): 9 INJECTION INTRAMUSCULAR; INTRAVENOUS; SUBCUTANEOUS at 22:49

## 2022-01-01 RX ADMIN — Medication 1 PATCH: at 11:53

## 2022-01-01 RX ADMIN — HEPARIN SODIUM 5000 UNIT(S): 5000 INJECTION INTRAVENOUS; SUBCUTANEOUS at 13:09

## 2022-01-01 RX ADMIN — Medication 3 MILLILITER(S): at 17:25

## 2022-01-01 RX ADMIN — BUDESONIDE AND FORMOTEROL FUMARATE DIHYDRATE 2 PUFF(S): 160; 4.5 AEROSOL RESPIRATORY (INHALATION) at 21:47

## 2022-01-01 RX ADMIN — SENNA PLUS 1 TABLET(S): 8.6 TABLET ORAL at 06:26

## 2022-01-01 RX ADMIN — Medication 3 MILLILITER(S): at 22:59

## 2022-01-01 RX ADMIN — Medication 81 MILLIGRAM(S): at 13:38

## 2022-01-01 RX ADMIN — Medication 40 MILLIGRAM(S): at 01:22

## 2022-01-01 RX ADMIN — Medication 1 PATCH: at 13:46

## 2022-01-01 RX ADMIN — Medication 25 MILLIGRAM(S): at 17:54

## 2022-01-01 RX ADMIN — Medication 1 PATCH: at 11:49

## 2022-01-01 RX ADMIN — Medication 20 MILLIGRAM(S): at 06:05

## 2022-01-01 RX ADMIN — ATORVASTATIN CALCIUM 20 MILLIGRAM(S): 80 TABLET, FILM COATED ORAL at 21:05

## 2022-01-01 RX ADMIN — SODIUM CHLORIDE 4 MILLILITER(S): 9 INJECTION INTRAMUSCULAR; INTRAVENOUS; SUBCUTANEOUS at 03:35

## 2022-01-01 RX ADMIN — AZITHROMYCIN 500 MILLIGRAM(S): 500 TABLET, FILM COATED ORAL at 11:47

## 2022-01-01 RX ADMIN — Medication 1 PATCH: at 07:05

## 2022-01-01 RX ADMIN — Medication 60 MILLIGRAM(S): at 06:33

## 2022-01-01 RX ADMIN — Medication 1 PATCH: at 14:40

## 2022-01-01 RX ADMIN — Medication 3 MILLILITER(S): at 21:10

## 2022-01-01 RX ADMIN — PANTOPRAZOLE SODIUM 40 MILLIGRAM(S): 20 TABLET, DELAYED RELEASE ORAL at 06:56

## 2022-01-01 RX ADMIN — SENNA PLUS 1 TABLET(S): 8.6 TABLET ORAL at 17:53

## 2022-01-01 RX ADMIN — HEPARIN SODIUM 5000 UNIT(S): 5000 INJECTION INTRAVENOUS; SUBCUTANEOUS at 21:05

## 2022-01-01 RX ADMIN — HEPARIN SODIUM 5000 UNIT(S): 5000 INJECTION INTRAVENOUS; SUBCUTANEOUS at 13:28

## 2022-01-01 RX ADMIN — Medication 1 PATCH: at 12:30

## 2022-01-01 RX ADMIN — PANTOPRAZOLE SODIUM 40 MILLIGRAM(S): 20 TABLET, DELAYED RELEASE ORAL at 06:33

## 2022-01-01 RX ADMIN — SENNA PLUS 1 TABLET(S): 8.6 TABLET ORAL at 17:37

## 2022-01-01 RX ADMIN — PANTOPRAZOLE SODIUM 40 MILLIGRAM(S): 20 TABLET, DELAYED RELEASE ORAL at 07:00

## 2022-01-01 RX ADMIN — BUDESONIDE AND FORMOTEROL FUMARATE DIHYDRATE 2 PUFF(S): 160; 4.5 AEROSOL RESPIRATORY (INHALATION) at 10:00

## 2022-01-01 RX ADMIN — Medication 30 MILLIGRAM(S): at 05:27

## 2022-01-01 RX ADMIN — HEPARIN SODIUM 5000 UNIT(S): 5000 INJECTION INTRAVENOUS; SUBCUTANEOUS at 13:51

## 2022-01-01 RX ADMIN — Medication 25 MILLIGRAM(S): at 17:43

## 2022-01-01 RX ADMIN — PANTOPRAZOLE SODIUM 40 MILLIGRAM(S): 20 TABLET, DELAYED RELEASE ORAL at 05:35

## 2022-01-01 RX ADMIN — POLYETHYLENE GLYCOL 3350 17 GRAM(S): 17 POWDER, FOR SOLUTION ORAL at 11:08

## 2022-01-01 RX ADMIN — SODIUM CHLORIDE 500 MILLILITER(S): 9 INJECTION INTRAMUSCULAR; INTRAVENOUS; SUBCUTANEOUS at 15:49

## 2022-01-01 RX ADMIN — Medication 60 MILLIGRAM(S): at 06:39

## 2022-01-01 RX ADMIN — Medication 25 MILLIGRAM(S): at 06:54

## 2022-01-01 RX ADMIN — HEPARIN SODIUM 5000 UNIT(S): 5000 INJECTION INTRAVENOUS; SUBCUTANEOUS at 13:45

## 2022-01-01 RX ADMIN — SENNA PLUS 1 TABLET(S): 8.6 TABLET ORAL at 06:33

## 2022-01-01 RX ADMIN — BUDESONIDE AND FORMOTEROL FUMARATE DIHYDRATE 2 PUFF(S): 160; 4.5 AEROSOL RESPIRATORY (INHALATION) at 09:01

## 2022-01-01 RX ADMIN — HEPARIN SODIUM 5000 UNIT(S): 5000 INJECTION INTRAVENOUS; SUBCUTANEOUS at 22:18

## 2022-01-01 RX ADMIN — Medication 3 MILLILITER(S): at 23:12

## 2022-01-01 RX ADMIN — BUDESONIDE AND FORMOTEROL FUMARATE DIHYDRATE 2 PUFF(S): 160; 4.5 AEROSOL RESPIRATORY (INHALATION) at 21:31

## 2022-01-01 RX ADMIN — Medication 25 MILLIGRAM(S): at 06:59

## 2022-01-01 RX ADMIN — BUDESONIDE AND FORMOTEROL FUMARATE DIHYDRATE 2 PUFF(S): 160; 4.5 AEROSOL RESPIRATORY (INHALATION) at 22:18

## 2022-01-01 RX ADMIN — Medication 3 MILLILITER(S): at 22:35

## 2022-01-01 RX ADMIN — SODIUM CHLORIDE 50 MILLILITER(S): 9 INJECTION INTRAMUSCULAR; INTRAVENOUS; SUBCUTANEOUS at 03:51

## 2022-01-01 RX ADMIN — Medication 81 MILLIGRAM(S): at 12:02

## 2022-01-01 RX ADMIN — Medication 20 MILLIGRAM(S): at 21:28

## 2022-01-01 RX ADMIN — SENNA PLUS 1 TABLET(S): 8.6 TABLET ORAL at 06:54

## 2022-01-01 RX ADMIN — SODIUM CHLORIDE 50 MILLILITER(S): 9 INJECTION INTRAMUSCULAR; INTRAVENOUS; SUBCUTANEOUS at 10:03

## 2022-01-01 RX ADMIN — SODIUM ZIRCONIUM CYCLOSILICATE 10 GRAM(S): 10 POWDER, FOR SUSPENSION ORAL at 21:53

## 2022-01-01 RX ADMIN — SENNA PLUS 1 TABLET(S): 8.6 TABLET ORAL at 17:42

## 2022-01-01 RX ADMIN — BUDESONIDE AND FORMOTEROL FUMARATE DIHYDRATE 2 PUFF(S): 160; 4.5 AEROSOL RESPIRATORY (INHALATION) at 21:05

## 2022-01-01 RX ADMIN — Medication 90 MILLIGRAM(S): at 06:07

## 2022-01-01 RX ADMIN — Medication 40 MILLIGRAM(S): at 06:25

## 2022-01-01 RX ADMIN — INSULIN HUMAN 5 UNIT(S): 100 INJECTION, SOLUTION SUBCUTANEOUS at 14:43

## 2022-01-01 RX ADMIN — Medication 25 MILLIGRAM(S): at 17:28

## 2022-01-01 RX ADMIN — ATORVASTATIN CALCIUM 20 MILLIGRAM(S): 80 TABLET, FILM COATED ORAL at 21:28

## 2022-01-01 RX ADMIN — Medication 25 MILLIGRAM(S): at 06:07

## 2022-01-01 RX ADMIN — Medication 3 MILLIGRAM(S): at 22:07

## 2022-01-01 RX ADMIN — BUDESONIDE AND FORMOTEROL FUMARATE DIHYDRATE 2 PUFF(S): 160; 4.5 AEROSOL RESPIRATORY (INHALATION) at 11:36

## 2022-01-01 RX ADMIN — SODIUM CHLORIDE 4 MILLILITER(S): 9 INJECTION INTRAMUSCULAR; INTRAVENOUS; SUBCUTANEOUS at 15:40

## 2022-01-01 RX ADMIN — POLYETHYLENE GLYCOL 3350 17 GRAM(S): 17 POWDER, FOR SOLUTION ORAL at 14:40

## 2022-01-01 RX ADMIN — Medication 1 PATCH: at 18:07

## 2022-01-01 RX ADMIN — BUDESONIDE AND FORMOTEROL FUMARATE DIHYDRATE 2 PUFF(S): 160; 4.5 AEROSOL RESPIRATORY (INHALATION) at 10:03

## 2022-01-01 RX ADMIN — HEPARIN SODIUM 5000 UNIT(S): 5000 INJECTION INTRAVENOUS; SUBCUTANEOUS at 21:53

## 2022-01-01 RX ADMIN — Medication 1 PATCH: at 13:39

## 2022-01-01 RX ADMIN — SODIUM CHLORIDE 4 MILLILITER(S): 9 INJECTION INTRAMUSCULAR; INTRAVENOUS; SUBCUTANEOUS at 15:32

## 2022-01-01 RX ADMIN — SODIUM ZIRCONIUM CYCLOSILICATE 10 GRAM(S): 10 POWDER, FOR SUSPENSION ORAL at 20:54

## 2022-01-01 RX ADMIN — Medication 3 MILLILITER(S): at 10:46

## 2022-01-01 RX ADMIN — Medication 1 PATCH: at 12:19

## 2022-01-01 RX ADMIN — Medication 3 MILLILITER(S): at 15:20

## 2022-01-01 RX ADMIN — SENNA PLUS 1 TABLET(S): 8.6 TABLET ORAL at 18:14

## 2022-01-01 RX ADMIN — SODIUM CHLORIDE 4 MILLILITER(S): 9 INJECTION INTRAMUSCULAR; INTRAVENOUS; SUBCUTANEOUS at 09:38

## 2022-01-01 RX ADMIN — Medication 3 MILLILITER(S): at 09:41

## 2022-01-01 RX ADMIN — Medication 25 MILLIGRAM(S): at 06:26

## 2022-01-01 RX ADMIN — Medication 3 MILLILITER(S): at 04:55

## 2022-01-01 RX ADMIN — POLYETHYLENE GLYCOL 3350 17 GRAM(S): 17 POWDER, FOR SOLUTION ORAL at 17:38

## 2022-01-01 RX ADMIN — Medication 3 MILLILITER(S): at 21:18

## 2022-01-01 RX ADMIN — POLYETHYLENE GLYCOL 3350 17 GRAM(S): 17 POWDER, FOR SOLUTION ORAL at 11:35

## 2022-01-01 RX ADMIN — SODIUM CHLORIDE 4 MILLILITER(S): 9 INJECTION INTRAMUSCULAR; INTRAVENOUS; SUBCUTANEOUS at 04:17

## 2022-01-01 RX ADMIN — SODIUM ZIRCONIUM CYCLOSILICATE 10 GRAM(S): 10 POWDER, FOR SUSPENSION ORAL at 05:09

## 2022-01-01 RX ADMIN — SENNA PLUS 1 TABLET(S): 8.6 TABLET ORAL at 17:29

## 2022-01-01 RX ADMIN — Medication 3 MILLILITER(S): at 03:35

## 2022-01-01 RX ADMIN — Medication 25 MILLIGRAM(S): at 06:39

## 2022-01-01 RX ADMIN — Medication 81 MILLIGRAM(S): at 11:37

## 2022-01-01 RX ADMIN — PANTOPRAZOLE SODIUM 40 MILLIGRAM(S): 20 TABLET, DELAYED RELEASE ORAL at 06:06

## 2022-01-01 RX ADMIN — Medication 1 PATCH: at 17:58

## 2022-01-01 RX ADMIN — SODIUM CHLORIDE 500 MILLILITER(S): 9 INJECTION, SOLUTION INTRAVENOUS at 11:33

## 2022-01-01 RX ADMIN — Medication 1 PATCH: at 12:02

## 2022-01-01 RX ADMIN — Medication 40 MILLIGRAM(S): at 06:35

## 2022-01-01 RX ADMIN — Medication 20 MILLIGRAM(S): at 05:27

## 2022-01-01 RX ADMIN — Medication 0.5 MILLIGRAM(S): at 15:50

## 2022-01-01 RX ADMIN — PANTOPRAZOLE SODIUM 40 MILLIGRAM(S): 20 TABLET, DELAYED RELEASE ORAL at 06:25

## 2022-01-01 RX ADMIN — Medication 1 PATCH: at 07:21

## 2022-01-01 RX ADMIN — Medication 20 MILLIGRAM(S): at 05:42

## 2022-01-01 RX ADMIN — Medication 25 MILLIGRAM(S): at 06:33

## 2022-01-01 RX ADMIN — Medication 3 MILLILITER(S): at 15:34

## 2022-01-01 RX ADMIN — HEPARIN SODIUM 5000 UNIT(S): 5000 INJECTION INTRAVENOUS; SUBCUTANEOUS at 14:40

## 2022-01-01 RX ADMIN — HEPARIN SODIUM 5000 UNIT(S): 5000 INJECTION INTRAVENOUS; SUBCUTANEOUS at 05:26

## 2022-01-01 RX ADMIN — Medication 1 PATCH: at 13:17

## 2022-01-01 RX ADMIN — POLYETHYLENE GLYCOL 3350 17 GRAM(S): 17 POWDER, FOR SOLUTION ORAL at 12:19

## 2022-01-01 RX ADMIN — POLYETHYLENE GLYCOL 3350 17 GRAM(S): 17 POWDER, FOR SOLUTION ORAL at 11:09

## 2022-01-01 RX ADMIN — HEPARIN SODIUM 5000 UNIT(S): 5000 INJECTION INTRAVENOUS; SUBCUTANEOUS at 05:57

## 2022-01-01 RX ADMIN — HEPARIN SODIUM 5000 UNIT(S): 5000 INJECTION INTRAVENOUS; SUBCUTANEOUS at 22:20

## 2022-01-01 RX ADMIN — Medication 1 PATCH: at 18:00

## 2022-01-01 RX ADMIN — Medication 1 PATCH: at 11:28

## 2022-01-01 RX ADMIN — CEFTRIAXONE 100 MILLIGRAM(S): 500 INJECTION, POWDER, FOR SOLUTION INTRAMUSCULAR; INTRAVENOUS at 21:53

## 2022-01-01 RX ADMIN — CEFTRIAXONE 100 MILLIGRAM(S): 500 INJECTION, POWDER, FOR SOLUTION INTRAMUSCULAR; INTRAVENOUS at 23:34

## 2022-01-01 RX ADMIN — BUDESONIDE AND FORMOTEROL FUMARATE DIHYDRATE 2 PUFF(S): 160; 4.5 AEROSOL RESPIRATORY (INHALATION) at 21:26

## 2022-01-01 RX ADMIN — SODIUM CHLORIDE 4 MILLILITER(S): 9 INJECTION INTRAMUSCULAR; INTRAVENOUS; SUBCUTANEOUS at 15:30

## 2022-01-01 RX ADMIN — HEPARIN SODIUM 5000 UNIT(S): 5000 INJECTION INTRAVENOUS; SUBCUTANEOUS at 21:17

## 2022-01-01 RX ADMIN — SENNA PLUS 1 TABLET(S): 8.6 TABLET ORAL at 06:06

## 2022-01-01 RX ADMIN — Medication 3 MILLILITER(S): at 15:42

## 2022-01-01 RX ADMIN — SODIUM ZIRCONIUM CYCLOSILICATE 10 GRAM(S): 10 POWDER, FOR SUSPENSION ORAL at 13:09

## 2022-01-01 RX ADMIN — Medication 1 PATCH: at 19:22

## 2022-01-01 RX ADMIN — HEPARIN SODIUM 5000 UNIT(S): 5000 INJECTION INTRAVENOUS; SUBCUTANEOUS at 06:59

## 2022-01-01 RX ADMIN — HEPARIN SODIUM 5000 UNIT(S): 5000 INJECTION INTRAVENOUS; SUBCUTANEOUS at 14:27

## 2022-01-01 RX ADMIN — Medication 3 MILLILITER(S): at 00:33

## 2022-01-01 RX ADMIN — Medication 1 PATCH: at 19:02

## 2022-01-01 RX ADMIN — Medication 3 MILLILITER(S): at 11:02

## 2022-01-01 RX ADMIN — PANTOPRAZOLE SODIUM 40 MILLIGRAM(S): 20 TABLET, DELAYED RELEASE ORAL at 06:35

## 2022-01-01 RX ADMIN — Medication 3 MILLILITER(S): at 04:32

## 2022-01-01 RX ADMIN — HEPARIN SODIUM 5000 UNIT(S): 5000 INJECTION INTRAVENOUS; SUBCUTANEOUS at 06:26

## 2022-01-01 RX ADMIN — SODIUM ZIRCONIUM CYCLOSILICATE 10 GRAM(S): 10 POWDER, FOR SUSPENSION ORAL at 13:51

## 2022-01-01 RX ADMIN — Medication 50 MILLILITER(S): at 14:43

## 2022-01-01 RX ADMIN — Medication 90 MILLIGRAM(S): at 06:26

## 2022-01-01 RX ADMIN — BUDESONIDE AND FORMOTEROL FUMARATE DIHYDRATE 2 PUFF(S): 160; 4.5 AEROSOL RESPIRATORY (INHALATION) at 22:52

## 2022-01-01 RX ADMIN — Medication 40 MILLIGRAM(S): at 11:37

## 2022-01-01 RX ADMIN — Medication 25 MILLIGRAM(S): at 18:02

## 2022-01-01 RX ADMIN — SODIUM CHLORIDE 4 MILLILITER(S): 9 INJECTION INTRAMUSCULAR; INTRAVENOUS; SUBCUTANEOUS at 04:11

## 2022-01-01 RX ADMIN — Medication 25 MILLIGRAM(S): at 18:35

## 2022-01-01 RX ADMIN — Medication 1 PATCH: at 14:37

## 2022-01-01 RX ADMIN — HEPARIN SODIUM 5000 UNIT(S): 5000 INJECTION INTRAVENOUS; SUBCUTANEOUS at 21:30

## 2022-01-01 RX ADMIN — SODIUM CHLORIDE 4 MILLILITER(S): 9 INJECTION INTRAMUSCULAR; INTRAVENOUS; SUBCUTANEOUS at 15:35

## 2022-01-01 RX ADMIN — Medication 1 PATCH: at 11:09

## 2022-01-01 RX ADMIN — HEPARIN SODIUM 5000 UNIT(S): 5000 INJECTION INTRAVENOUS; SUBCUTANEOUS at 05:38

## 2022-01-01 RX ADMIN — HEPARIN SODIUM 5000 UNIT(S): 5000 INJECTION INTRAVENOUS; SUBCUTANEOUS at 08:05

## 2022-01-01 RX ADMIN — Medication 3 MILLILITER(S): at 09:08

## 2022-01-01 RX ADMIN — Medication 3 MILLILITER(S): at 11:46

## 2022-01-01 RX ADMIN — Medication 90 MILLIGRAM(S): at 11:37

## 2022-01-01 RX ADMIN — Medication 60 MILLIGRAM(S): at 07:00

## 2022-01-01 RX ADMIN — SODIUM ZIRCONIUM CYCLOSILICATE 10 GRAM(S): 10 POWDER, FOR SUSPENSION ORAL at 10:58

## 2022-01-01 RX ADMIN — BUDESONIDE AND FORMOTEROL FUMARATE DIHYDRATE 2 PUFF(S): 160; 4.5 AEROSOL RESPIRATORY (INHALATION) at 21:17

## 2022-01-01 RX ADMIN — CEFTRIAXONE 100 MILLIGRAM(S): 500 INJECTION, POWDER, FOR SOLUTION INTRAMUSCULAR; INTRAVENOUS at 21:47

## 2022-01-01 RX ADMIN — BUDESONIDE AND FORMOTEROL FUMARATE DIHYDRATE 2 PUFF(S): 160; 4.5 AEROSOL RESPIRATORY (INHALATION) at 09:52

## 2022-01-01 RX ADMIN — Medication 20 MILLIGRAM(S): at 06:40

## 2022-01-01 RX ADMIN — HEPARIN SODIUM 5000 UNIT(S): 5000 INJECTION INTRAVENOUS; SUBCUTANEOUS at 06:05

## 2022-01-01 RX ADMIN — BUDESONIDE AND FORMOTEROL FUMARATE DIHYDRATE 2 PUFF(S): 160; 4.5 AEROSOL RESPIRATORY (INHALATION) at 21:18

## 2022-01-01 RX ADMIN — Medication 20 MILLIGRAM(S): at 06:34

## 2022-01-01 RX ADMIN — SENNA PLUS 1 TABLET(S): 8.6 TABLET ORAL at 07:00

## 2022-01-01 RX ADMIN — Medication 90 MILLIGRAM(S): at 09:48

## 2022-01-01 RX ADMIN — Medication 3 MILLILITER(S): at 23:13

## 2022-01-01 RX ADMIN — CEFTRIAXONE 100 MILLIGRAM(S): 500 INJECTION, POWDER, FOR SOLUTION INTRAMUSCULAR; INTRAVENOUS at 23:52

## 2022-01-01 RX ADMIN — PANTOPRAZOLE SODIUM 40 MILLIGRAM(S): 20 TABLET, DELAYED RELEASE ORAL at 11:35

## 2022-01-01 RX ADMIN — AZITHROMYCIN 500 MILLIGRAM(S): 500 TABLET, FILM COATED ORAL at 12:57

## 2022-01-01 RX ADMIN — Medication 20 MILLIGRAM(S): at 20:49

## 2022-01-01 RX ADMIN — SODIUM CHLORIDE 4 MILLILITER(S): 9 INJECTION INTRAMUSCULAR; INTRAVENOUS; SUBCUTANEOUS at 04:33

## 2022-01-01 RX ADMIN — Medication 1 PATCH: at 13:51

## 2022-01-01 RX ADMIN — Medication 25 MILLIGRAM(S): at 18:13

## 2022-01-01 RX ADMIN — Medication 3 MILLILITER(S): at 04:26

## 2022-01-01 RX ADMIN — Medication 5 MILLIGRAM(S): at 14:40

## 2022-01-01 RX ADMIN — Medication 20 MILLIGRAM(S): at 07:15

## 2022-01-01 RX ADMIN — HEPARIN SODIUM 5000 UNIT(S): 5000 INJECTION INTRAVENOUS; SUBCUTANEOUS at 06:40

## 2022-01-01 RX ADMIN — Medication 60 MILLIGRAM(S): at 05:37

## 2022-01-01 RX ADMIN — Medication 1 PATCH: at 07:23

## 2022-01-01 RX ADMIN — Medication 125 MILLIGRAM(S): at 21:25

## 2022-01-01 RX ADMIN — ATORVASTATIN CALCIUM 20 MILLIGRAM(S): 80 TABLET, FILM COATED ORAL at 21:30

## 2022-01-01 RX ADMIN — POLYETHYLENE GLYCOL 3350 17 GRAM(S): 17 POWDER, FOR SOLUTION ORAL at 11:50

## 2022-01-01 RX ADMIN — Medication 3 MILLILITER(S): at 10:36

## 2022-01-01 RX ADMIN — BUDESONIDE AND FORMOTEROL FUMARATE DIHYDRATE 2 PUFF(S): 160; 4.5 AEROSOL RESPIRATORY (INHALATION) at 10:29

## 2022-01-01 RX ADMIN — BUDESONIDE AND FORMOTEROL FUMARATE DIHYDRATE 2 PUFF(S): 160; 4.5 AEROSOL RESPIRATORY (INHALATION) at 09:07

## 2022-01-01 RX ADMIN — Medication 1 PATCH: at 07:04

## 2022-01-01 RX ADMIN — BUDESONIDE AND FORMOTEROL FUMARATE DIHYDRATE 2 PUFF(S): 160; 4.5 AEROSOL RESPIRATORY (INHALATION) at 22:38

## 2022-01-01 RX ADMIN — Medication 3 MILLILITER(S): at 03:53

## 2022-01-01 RX ADMIN — POLYETHYLENE GLYCOL 3350 17 GRAM(S): 17 POWDER, FOR SOLUTION ORAL at 12:24

## 2022-01-01 RX ADMIN — Medication 1 PATCH: at 07:45

## 2022-01-01 RX ADMIN — Medication 3 MILLILITER(S): at 03:27

## 2022-01-01 RX ADMIN — BUDESONIDE AND FORMOTEROL FUMARATE DIHYDRATE 2 PUFF(S): 160; 4.5 AEROSOL RESPIRATORY (INHALATION) at 10:58

## 2022-01-01 RX ADMIN — SODIUM CHLORIDE 4 MILLILITER(S): 9 INJECTION INTRAMUSCULAR; INTRAVENOUS; SUBCUTANEOUS at 10:36

## 2022-01-01 RX ADMIN — Medication 3 MILLILITER(S): at 18:34

## 2022-01-01 RX ADMIN — SENNA PLUS 1 TABLET(S): 8.6 TABLET ORAL at 06:35

## 2022-01-01 RX ADMIN — ATORVASTATIN CALCIUM 20 MILLIGRAM(S): 80 TABLET, FILM COATED ORAL at 21:17

## 2022-01-01 RX ADMIN — SODIUM ZIRCONIUM CYCLOSILICATE 10 GRAM(S): 10 POWDER, FOR SUSPENSION ORAL at 06:26

## 2022-01-01 RX ADMIN — SODIUM CHLORIDE 4 MILLILITER(S): 9 INJECTION INTRAMUSCULAR; INTRAVENOUS; SUBCUTANEOUS at 23:14

## 2022-01-01 RX ADMIN — SODIUM CHLORIDE 4 MILLILITER(S): 9 INJECTION INTRAMUSCULAR; INTRAVENOUS; SUBCUTANEOUS at 10:00

## 2022-01-01 RX ADMIN — PANTOPRAZOLE SODIUM 40 MILLIGRAM(S): 20 TABLET, DELAYED RELEASE ORAL at 06:39

## 2022-01-01 RX ADMIN — HEPARIN SODIUM 5000 UNIT(S): 5000 INJECTION INTRAVENOUS; SUBCUTANEOUS at 23:52

## 2022-01-01 RX ADMIN — HEPARIN SODIUM 5000 UNIT(S): 5000 INJECTION INTRAVENOUS; SUBCUTANEOUS at 22:53

## 2022-01-01 RX ADMIN — Medication 40 MILLIGRAM(S): at 06:59

## 2022-01-01 RX ADMIN — SENNA PLUS 1 TABLET(S): 8.6 TABLET ORAL at 09:52

## 2022-01-01 RX ADMIN — Medication 1 PATCH: at 08:56

## 2022-01-01 RX ADMIN — Medication 400 MILLIGRAM(S): at 13:34

## 2022-01-01 RX ADMIN — Medication 90 MILLIGRAM(S): at 07:00

## 2022-01-01 RX ADMIN — Medication 25 MILLIGRAM(S): at 05:37

## 2022-01-01 RX ADMIN — HEPARIN SODIUM 5000 UNIT(S): 5000 INJECTION INTRAVENOUS; SUBCUTANEOUS at 22:37

## 2022-01-01 RX ADMIN — Medication 3 MILLILITER(S): at 04:11

## 2022-01-01 RX ADMIN — SENNA PLUS 1 TABLET(S): 8.6 TABLET ORAL at 18:35

## 2022-01-01 RX ADMIN — SODIUM CHLORIDE 4 MILLILITER(S): 9 INJECTION INTRAMUSCULAR; INTRAVENOUS; SUBCUTANEOUS at 23:25

## 2022-01-01 RX ADMIN — AZITHROMYCIN 255 MILLIGRAM(S): 500 TABLET, FILM COATED ORAL at 22:54

## 2022-01-01 RX ADMIN — Medication 3 MILLILITER(S): at 16:09

## 2022-01-01 RX ADMIN — Medication 25 MILLIGRAM(S): at 17:37

## 2022-01-01 RX ADMIN — SENNA PLUS 1 TABLET(S): 8.6 TABLET ORAL at 06:25

## 2022-01-01 RX ADMIN — BUDESONIDE AND FORMOTEROL FUMARATE DIHYDRATE 2 PUFF(S): 160; 4.5 AEROSOL RESPIRATORY (INHALATION) at 08:42

## 2022-01-01 RX ADMIN — SENNA PLUS 1 TABLET(S): 8.6 TABLET ORAL at 06:39

## 2022-01-01 RX ADMIN — SODIUM CHLORIDE 4 MILLILITER(S): 9 INJECTION INTRAMUSCULAR; INTRAVENOUS; SUBCUTANEOUS at 09:54

## 2022-01-01 RX ADMIN — PANTOPRAZOLE SODIUM 40 MILLIGRAM(S): 20 TABLET, DELAYED RELEASE ORAL at 05:38

## 2022-01-01 RX ADMIN — Medication 25 MILLIGRAM(S): at 17:42

## 2022-01-01 RX ADMIN — Medication 3 MILLILITER(S): at 23:10

## 2022-09-21 NOTE — ED PROVIDER NOTE - ATTENDING CONTRIBUTION TO CARE
`CRITICAL CARE TIME WAS NECESSARY TO TREAT OR PREVENT LIFE THREATENING DETERIORATION FROM THE FOLLOWING CONDITIONS:  [x  ] Heart Failure and/or Circulatory Collapse and or MI/NSTEMI  [  ] Sepsis [ x ] Respiratory failure  [  ]CNS Failure or Compromise    [  ]Dehydration [  ]Renal Failure [  ]Hepatic Failure Sepsis [  ]Endocrine Crisis  [  ]Metabolic Crisis  [  ]Toxidrome   [  ]Trauma    CRITICAL CARE TIME WAS SPENT BY ME IN THE FOLLOWING ACTIVITIES:  [X  ] Performance of the History and Physical Examination [X  ] Review of Old Charts [ x  ] IV Access and or Obtaining Necessary Diagnostic Tests   [X  ] Ordering and Performing Treatments and Interventions:   Specifically:  [  ] Ventilator Management [  ] Vascular Access Procedures [  ] NGT Placement  [  ] Transcutaneous Pacing  [ X ] Establishment of Goals of Care with the Patient or Their Designated Representative  [X  ] Developing and Evaluating Treatment Plan with Consultants and/or the Primary Provider  [ x ] Serial Reevaluation of the Patients Condition and Response to Therapeutic Measures   Specifically: [  ] Interpretation of Cardiac and Respiratory Parameters  [X  ]Ordering and Interpretating  Diagnostic Studies  Comments:  The patient is a 76y Male who has a past medical and surgery history of COPD Lung CA s/p QAMAR lobectomy  HTN GERD PVD AAA (Endoleak EVAR )S/P ORIF rt ankle PTED with hypoxia to 76% c/o SOB weakness cough and congestion over the past week. S/s have been progressing for months. Denies fever chills    Vital Signs Last 24 Hrs  HR: 73 BP: 102/89 RR: 16 SpO2: 94% (21 Sep 2022 18:57) (94% - 94%)  PE: as described; my additions and exceptions are noted in the chart    DATA:  EKG: NSR@70; ? age inferior infarct which are both new c/w 9/27/18 EKG  LAB: Pending at time of evaluation    IMPRESSION/RISK:  Dx= SOB 2/2 core pulmonale from poor followup and therapy for COPD/JOHN in patient who continues to smoke. Very large component of presentation chronic but has acute worsening today   Consideration include Needs BIPAP will treat/work up for COPD exacerbation which could cause decompensation from baseline  Plan  as above for labs/blood gas troponin BNP CXR EKG   azithromycin  IVPB 500 milliGRAM(s) IV Intermittent  cefTRIAXone   IVPB 1000 milliGRAM(s) IV Intermittent  RVP  VBG lytes creatinine would also send trops/bnp for ACS and/or component severity of right sided HF  TBA

## 2022-09-21 NOTE — ED ADULT TRIAGE NOTE - CHIEF COMPLAINT QUOTE
Pt c/o sob, weakness and cough /congestion x 2 months worse over the past week.  Pt with hx of lung CA and COPD.  Pt 89 % RA Pt c/o sob, weakness and cough /congestion x 2 months , worse over the past week.  Pt with hx of lung CA and COPD.  Pt 76 % RA,  O2 provided

## 2022-09-21 NOTE — ED PROVIDER NOTE - NSICDXPASTMEDICALHX_GEN_ALL_CORE_FT
PAST MEDICAL HISTORY:  AAA (abdominal aortic aneurysm) s/p repair in 12/2017    GERD (gastroesophageal reflux disease)     HTN (hypertension)     PVD (peripheral vascular disease)     Smoker

## 2022-09-21 NOTE — ED PROVIDER NOTE - NSICDXPASTSURGICALHX_GEN_ALL_CORE_FT
PAST SURGICAL HISTORY:  Endoleak post (EVAR) endovascular aneurysm repair 12/2017    S/P cataract surgery bilateral    S/P ORIF (open reduction internal fixation) fracture Right ankle, distant past

## 2022-09-21 NOTE — CONSULT NOTE ADULT - ASSESSMENT
75 yo M with h/o HTN, PVD, lower ext DVTs s/p GF filter, AAA s/p repair (2017), Lung CA s/p left lobectomy, GERD, COPD, tobacco use disorder presents with hypoxia and shortness of breath.  Pt admitted for PNA, COPD exacerbation    Cardiology called for elevated troponin i/s/o ALFREDO, infectious process and copd exacerbation.

## 2022-09-21 NOTE — ED ADULT NURSE NOTE - OBJECTIVE STATEMENT
Patient received in ED room 7. A&Ox4. Daughter at bedside. C/o SOB, weakness and congestion for more than Patient received in ED room 7. A&Ox4. Daughter at bedside. C/o SOB, weakness and congestion for more than 3 weeks. States came to ED as per PCP and worsening pain. Respiratory at bedside placing bipap. Pt speaking in full and complete sentences. Denies headache, dizziness, lightheadedness, nausea, vomiting, fever or chills. Dry skin noted. IV 20G placed to left AC, labs drawn and sent as ordered. Placed on bedside cardiac monitor - NSR. Bed in lowest position, call bell in reach, wheels locked, side rails up, safety maintained. Awaiting further orders. Patient received in ED room 7. A&Ox4. Daughter at bedside. C/o SOB, weakness and congestion for more than 3 weeks. States came to ED as per PCP and worsening pain. Respiratory at bedside placing bipap. Pt speaking in full and complete sentences. Denies headache, dizziness, lightheadedness, nausea, vomiting, fever or chills. Dry skin noted. Wheezing auscultated in bilateral lung fields. IV 20G placed to left AC, labs drawn and sent as ordered. Placed on bedside cardiac monitor - NSR. Bed in lowest position, call bell in reach, wheels locked, side rails up, safety maintained. Awaiting further orders.

## 2022-09-21 NOTE — ED PROVIDER NOTE - OBJECTIVE STATEMENT
77yo male with PMH significant for vascular surgery to [facility], presents to the ED for acute shortness of breath. This afternoon, patient began to feel unwell and was seen at [facility], where he was noted to be febrile to 103 and significantly short of breath. He was put on O2; to note he is not on O2 at baseline. Patient was referred to the ED, where he was noted to be 92% on room air but 97% on 3L NC O2. Patient is recently recovering from shingles on his chest/back. No chest pain, abd pain, or foot swelling. To note, patient has a long history of smoking, no history of COPD. Jonathan PGY1 - 77 yo M with h/o HTN, PVD, AAA s/p repair (2017), GERD, COPD, tobacco use disorder presents with hypoxia and shortness of breath.  Daughter is a nurse and reports that the patient o2 sat was into the 70s, coughing up mucus and brought the patient to the ED.  Chronic b/l LE edema. Patient endorses SOB and increased WOB, no headache, n/v/d/abdominal pain, dysuria, fever/chills.

## 2022-09-21 NOTE — ED ADULT NURSE NOTE - CHIEF COMPLAINT QUOTE
Pt c/o sob, weakness and cough /congestion x 2 months , worse over the past week.  Pt with hx of lung CA and COPD.  Pt 76 % RA,  O2 provided

## 2022-09-21 NOTE — CONSULT NOTE ADULT - SUBJECTIVE AND OBJECTIVE BOX
HISTORY OF PRESENT ILLNESS:  Patient is a 76y old  Male who presents with a chief complaint of   HPI: 77 yo M with h/o HTN, PVD, lower ext DVTs s/p GF filter, AAA s/p repair (2017), Lung CA s/p left lobectomy, GERD, COPD, tobacco use disorder presents with hypoxia and shortness of breath.  Daughter is a nurse and reports that the patient o2 sat was into the 70s. Pt coughing up copious amt mucus and brought the patient to the ED. Daughters reports pt finished antibiotics 3 days ago for PNA In ED Pt placed on BIPAP, CXR Complete opacification of the right hemithorax. The left lung is clear. No left pleural effusion or pneumothorax.   Chronic b/l LE edema. Patient endorses SOB and increased WOB, no headache, n/v/d/abdominal pain, dysuria, fever/chills.      ED Course: In ER, patient found to have T 36.7 , HR 72, /67 , SpO2 . Labs remarkable for     Allergies    No Known Allergies  	    MEDICATIONS:  furosemide   Injectable 40 milliGRAM(s) IV Push Once                  PAST MEDICAL & SURGICAL HISTORY:  HTN (hypertension)  GERD (gastroesophageal reflux disease)  PVD (peripheral vascular disease)  AAA (abdominal aortic aneurysm)  s/p repair in 12/2017  Smoker  Endoleak post (EVAR) endovascular aneurysm repair  12/2017  S/P ORIF (open reduction internal fixation) fracture  Right ankle, distant past  S/P cataract surgery  bilateral          FAMILY HISTORY:      SOCIAL HISTORY:      Smoker: [ ] Active [ ] never  [ ] previous  Alcohol:  [ ] social [ ] daily [ ] never  Lives with:   Occupation:    REVIEW OF SYSTEMS  See HPI. Otherwise, 10 point ROS done and otherwise negative.  >>> <<<    PHYSICAL EXAM:  T(C): 36.7 (09-21-22 @ 22:54), Max: 36.7 (09-21-22 @ 22:54)  HR: 72 (09-21-22 @ 22:54) (72 - 73)  BP: 122/67 (09-21-22 @ 22:54) (102/89 - 122/67)  RR: 19 (09-21-22 @ 22:54) (16 - 19)  SpO2: 98% (09-21-22 @ 22:54) (94% - 98%)  Wt(kg): --    Appearance: Normal	  HEENT:   Normal oral mucosa, PERRL, EOMI	  Lymphatic: No lymphadenopathy  Cardiovascular: Normal S1 S2, No JVD, No murmurs, No edema  Respiratory: Lungs clear to auscultation	  Psychiatry: A & O x 3, Mood & affect appropriate  Gastrointestinal:  Soft, Non-tender, + BS	  Skin: No rashes, No ecchymoses, No cyanosis	  Neurologic: Non-focal, A&Ox3, nonfocal, KIM x 4  Extremities: Normal range of motion, No clubbing, cyanosis or edema  Vascular: Peripheral pulses palpable 2+ bilaterally    I&O's Summary    	 	  LABS:	 	                          11.9   20.31 )-----------( 196      ( 21 Sep 2022 21:17 )             39.0     09-21    117<LL>  |  80<L>  |  38<H>  ----------------------------<  132<H>  5.5<H>   |  24  |  2.08<H>  09-21    119<LL>  |  81<L>  |  41<H>  ----------------------------<  115<H>  5.6<H>   |  25  |  2.29<H>    Ca    8.6      21 Sep 2022 23:11  Ca    8.7      21 Sep 2022 21:17  Mg     2.30     09-21  TPro  7.6  /  Alb  4.1  /  TBili  0.2  /  DBili  x   /  AST  123<H>  /  ALT  23  /  AlkPhos  142<H>  09-21  LIVER FUNCTIONS - ( 21 Sep 2022 21:17 )  Alb: 4.1 g/dL / Pro: 7.6 g/dL / ALK PHOS: 142 U/L / ALT: 23 U/L / AST: 123 U/L / GGT: x         proBNP: Serum Pro-Brain Natriuretic Peptide: 4162 pg/mL (09-21 @ 21:17)  Lipid Profile:   HgA1c:   TSH: Thyroid Stimulating Hormone, Serum: 1.10 uIU/mL (09-21 @ 23:11)  PT/INR - ( 21 Sep 2022 21:17 )   PT: 13.5 sec;   INR: 1.16 ratio    PTT - ( 21 Sep 2022 21:17 )  PTT:26.5 sec  CARDIAC MARKERS:  Creatine Kinase, Serum: 1180 U/L (09-21 @ 23:11)  Blood Gas Venous - Lactate: 1.9 mmol/L (09-21 @ 21:17)                     HISTORY OF PRESENT ILLNESS:  Patient is a 76y old  Male who presents with a chief complaint of   HPI: 75 yo M with h/o HTN, PVD, lower ext DVTs s/p GF filter, AAA s/p repair (2017), Lung CA s/p left lobectomy, GERD, COPD, tobacco use disorder presents with hypoxia and shortness of breath.  Daughter is a nurse and reports that the patient o2 sat was into the 70s. Pt coughing up copious amt mucus and brought the patient to the ED. Daughters reports pt finished antibiotics 3 days ago for PNA. In ED Pt placed on BIPAP, CXR: Complete opacification of the right hemithorax.   The left lung is clear. No left pleural effusion or pneumothorax. EKG: SR w/ RBBB, non specific st changes. Labs remarkable for WBC 20, sodium 119, creatinine 2.29, pBNP 4162, troponin 1394. Pt started on antibiotics, multiple nebulizer treatments, solumedrol IV and lasix IVP.   Cardiology called for elevated troponin  Denies CP, palpitations, syncope, n/v/d/abdominal pain, dysuria, fever/chills.  VS: T 36.7 , HR 72, /67 , SpO2 .     Allergies    No Known Allergies  	    MEDICATIONS:  furosemide   Injectable 40 milliGRAM(s) IV Push Once                  PAST MEDICAL & SURGICAL HISTORY:  HTN (hypertension)  GERD (gastroesophageal reflux disease)  PVD (peripheral vascular disease)  AAA (abdominal aortic aneurysm)  s/p repair in 12/2017  Smoker  Endoleak post (EVAR) endovascular aneurysm repair  12/2017  S/P ORIF (open reduction internal fixation) fracture  Right ankle, distant past  S/P cataract surgery  bilateral          FAMILY HISTORY:      SOCIAL HISTORY:      Smoker: [ ] Active [ ] never  [ ] previous  Alcohol:  [ ] social [ ] daily [ ] never  Lives with:   Occupation:    REVIEW OF SYSTEMS  See HPI. Otherwise, 10 point ROS done and otherwise negative.  >>> <<<    PHYSICAL EXAM:  T(C): 36.7 (09-21-22 @ 22:54), Max: 36.7 (09-21-22 @ 22:54)  HR: 72 (09-21-22 @ 22:54) (72 - 73)  BP: 122/67 (09-21-22 @ 22:54) (102/89 - 122/67)  RR: 19 (09-21-22 @ 22:54) (16 - 19)  SpO2: 98% (09-21-22 @ 22:54) (94% - 98%)  Wt(kg): --    Appearance: BIPAP  HEENT:   Normal oral mucosa, 	  Lymphatic: No lymphadenopathy  Cardiovascular: Normal S1 S2, No JVD, No murmurs, Chronic b/l LE edema.  Respiratory: B/L Rhonchi  Psychiatry: A & O x 3, Mood & affect appropriate  Gastrointestinal:  Soft, Non-tender, + BS	  Skin: No rashes, No ecchymoses, No cyanosis	  Neurologic: Non-focal, A&Ox3, nonfocal, KIM x 4  Extremities: , No clubbing, cyanosis   Vascular: Peripheral pulses palpable 2+ bilaterally    I&O's Summary    	 	  LABS:	 	                          11.9   20.31 )-----------( 196      ( 21 Sep 2022 21:17 )             39.0     09-21    117<LL>  |  80<L>  |  38<H>  ----------------------------<  132<H>  5.5<H>   |  24  |  2.08<H>  09-21    119<LL>  |  81<L>  |  41<H>  ----------------------------<  115<H>  5.6<H>   |  25  |  2.29<H>    Ca    8.6      21 Sep 2022 23:11  Ca    8.7      21 Sep 2022 21:17  Mg     2.30     09-21  TPro  7.6  /  Alb  4.1  /  TBili  0.2  /  DBili  x   /  AST  123<H>  /  ALT  23  /  AlkPhos  142<H>  09-21  LIVER FUNCTIONS - ( 21 Sep 2022 21:17 )  Alb: 4.1 g/dL / Pro: 7.6 g/dL / ALK PHOS: 142 U/L / ALT: 23 U/L / AST: 123 U/L / GGT: x         proBNP: Serum Pro-Brain Natriuretic Peptide: 4162 pg/mL (09-21 @ 21:17)  Lipid Profile:   HgA1c:   TSH: Thyroid Stimulating Hormone, Serum: 1.10 uIU/mL (09-21 @ 23:11)  PT/INR - ( 21 Sep 2022 21:17 )   PT: 13.5 sec;   INR: 1.16 ratio    PTT - ( 21 Sep 2022 21:17 )  PTT:26.5 sec  CARDIAC MARKERS:  Creatine Kinase, Serum: 1180 U/L (09-21 @ 23:11)  Blood Gas Venous - Lactate: 1.9 mmol/L (09-21 @ 21:17)                     HISTORY OF PRESENT ILLNESS:  Patient is a 76y old  Male who presents with a chief complaint of   HPI: 75 yo M with h/o HTN, PVD, lower ext DVTs s/p GF filter, AAA s/p repair (2017), Lung CA s/p left lobectomy, GERD, COPD, tobacco use disorder presents with hypoxia and shortness of breath.  Daughter is a nurse and reports that the patient o2 sat was into the 70s. Pt coughing up copious amt mucus and brought the patient to the ED. Daughters reports pt finished antibiotics 3 days ago for PNA. In ED Pt placed on BIPAP, CXR: Complete opacification of the right hemithorax.   The left lung is clear. No left pleural effusion or pneumothorax. EKG: SR w/ RBBB, non specific st changes. Labs remarkable for WBC 20, sodium 119, creatinine 2.29, pBNP 4162, troponin 1394. Pt started on antibiotics, multiple nebulizer treatments, solumedrol IV and lasix IVP.   Cardiology called for elevated troponin  Denies CP, palpitations, syncope, n/v/d/abdominal pain, dysuria, fever/chills.  VS: T 36.7 , HR 72, /67 , SpO2 .     Allergies  No Known Allergies  	    MEDICATIONS:  furosemide   Injectable 40 milliGRAM(s) IV Push Once        Home Medications:   * Patient Currently Takes Medications as of 03-Oct-2018 15:44 documented in Structured Notes  · 	Keflex 500 mg oral capsule: 1 cap(s) orally 4 times a day MDD:4 capsules  · 	oxyCODONE-acetaminophen 5 mg-325 mg oral tablet: 1 tab(s) orally every 6 hours, As Needed -Moderate Pain (4 - 6) MDD:4  · 	Rolling Walker : application buccal once a day   · 	enoxaparin 100 mg/mL injectable solution: 100 milligram(s) subcutaneously 2 times a day MDD:200  · 	warfarin 5 mg oral tablet: 1 tab(s) orally once a day MDD:1  · 	polyethylene glycol 3350 oral powder for reconstitution: 17 gram(s) orally once a day  · 	bisacodyl 5 mg oral delayed release tablet: 1 tab(s) orally once a day (at bedtime), As needed, Constipation  · 	metoprolol tartrate 25 mg oral tablet: 1 tab(s) orally 2 times a day  · 	aspirin 81 mg oral tablet, chewable: 1 tab(s) orally once a day  · 	atorvastatin 40 mg oral tablet: 1 tab(s) orally once a day (at bedtime)  · 	Cardizem  mg/24 hours oral capsule, extended release: 1 cap(s) orally once a day  · 	famotidine 40 mg oral tablet: 1 tab(s) orally 2 times a day  · 	NIFEdipine 90 mg oral tablet, extended release: 1 tab(s) orally once a day  · 	pantoprazole 40 mg oral delayed release tablet: 1  orally 2 times a day before meals        PAST MEDICAL & SURGICAL HISTORY:  HTN (hypertension)  GERD (gastroesophageal reflux disease)  PVD (peripheral vascular disease)  AAA (abdominal aortic aneurysm)  s/p repair in 12/2017  Smoker  Endoleak post (EVAR) endovascular aneurysm repair  12/2017  S/P ORIF (open reduction internal fixation) fracture  Right ankle, distant past  S/P cataract surgery  bilateral      REVIEW OF SYSTEMS  See HPI. Otherwise, 10 point ROS done and otherwise negative.  >>> <<<    PHYSICAL EXAM:  T(C): 36.7 (09-21-22 @ 22:54), Max: 36.7 (09-21-22 @ 22:54)  HR: 72 (09-21-22 @ 22:54) (72 - 73)  BP: 122/67 (09-21-22 @ 22:54) (102/89 - 122/67)  RR: 19 (09-21-22 @ 22:54) (16 - 19)  SpO2: 98% (09-21-22 @ 22:54) (94% - 98%)  Wt(kg): --    Appearance: BIPAP  HEENT:   Normal oral mucosa, 	  Lymphatic: No lymphadenopathy  Cardiovascular: Normal S1 S2, No JVD, No murmurs, Chronic b/l LE edema.  Respiratory: B/L Rhonchi  Psychiatry: A & O x 3, Mood & affect appropriate  Gastrointestinal:  Soft, Non-tender, + BS	  Skin: No rashes, No ecchymoses, No cyanosis	  Neurologic: Non-focal, A&Ox3, nonfocal, KIM x 4  Extremities: , No clubbing, cyanosis   Vascular: Peripheral pulses palpable 2+ bilaterally    I&O's Summary    	 	  LABS:	 	                          11.9   20.31 )-----------( 196      ( 21 Sep 2022 21:17 )             39.0     09-21    117<LL>  |  80<L>  |  38<H>  ----------------------------<  132<H>  5.5<H>   |  24  |  2.08<H>  09-21    119<LL>  |  81<L>  |  41<H>  ----------------------------<  115<H>  5.6<H>   |  25  |  2.29<H>    Ca    8.6      21 Sep 2022 23:11  Ca    8.7      21 Sep 2022 21:17  Mg     2.30     09-21  TPro  7.6  /  Alb  4.1  /  TBili  0.2  /  DBili  x   /  AST  123<H>  /  ALT  23  /  AlkPhos  142<H>  09-21  LIVER FUNCTIONS - ( 21 Sep 2022 21:17 )  Alb: 4.1 g/dL / Pro: 7.6 g/dL / ALK PHOS: 142 U/L / ALT: 23 U/L / AST: 123 U/L / GGT: x         proBNP: Serum Pro-Brain Natriuretic Peptide: 4162 pg/mL (09-21 @ 21:17)  Lipid Profile:   HgA1c:   TSH: Thyroid Stimulating Hormone, Serum: 1.10 uIU/mL (09-21 @ 23:11)  PT/INR - ( 21 Sep 2022 21:17 )   PT: 13.5 sec;   INR: 1.16 ratio    PTT - ( 21 Sep 2022 21:17 )  PTT:26.5 sec  CARDIAC MARKERS:  Creatine Kinase, Serum: 1180 U/L (09-21 @ 23:11)  Blood Gas Venous - Lactate: 1.9 mmol/L (09-21 @ 21:17)

## 2022-09-21 NOTE — ED PROVIDER NOTE - PHYSICAL EXAMINATION
GENERAL: well appearing in no acute distress, non-toxic appearing  HEAD: normocephalic, atraumatic  HEENT: normal conjunctiva  CARDIAC: regular rate and rhythm, normal S1S2, no appreciable murmurs  PULM: rhonchi and wheezing b/l, bases > apices  GI: abdomen nondistended, soft, nontender, no guarding, rebound tenderness  : no suprapubic tenderness  NEURO: moving all 4 extremities, no focal deficits, normal speech, AAOx3   MSK: peripheral edema b/l, no calf tenderness b/l  SKIN: well-perfused, extremities warm  PSYCH: appropriate mood and affect

## 2022-09-21 NOTE — ED PROVIDER NOTE - CLINICAL SUMMARY MEDICAL DECISION MAKING FREE TEXT BOX
77yo male with long history of smoking not on O2 at baseline, presents to the ED for acute SOB with fever 103. Not in respiratory distress; SpO2 92% on room air, 97% on 3L NC. Lungs clear on exam without crackles/rhonchi, but +bilat CVA tenderness. No chest pain or dysuria. Concern for sepsis with unclear source of infx. Will obtain CBC, CMP, UA/UCx, CXR, Bld Cx, US doppler to bilat lower extrem, and CT chest angio. Will give Ceftriaxone + Azithro. Jonathan PGY1 -  77 yo M with h/o HTN, PVD, AAA s/p repair (2017), GERD, COPD, tobacco use disorder presents with hypoxia and shortness of breath.   Likely acute on chronic COPD exacerbation. Labs, cxr, trop, bnp, Jonathan PGY1 -  77 yo M with h/o HTN, PVD, AAA s/p repair (2017), GERD, COPD, tobacco use disorder presents with hypoxia and shortness of breath.   Likely acute on chronic COPD exacerbation. Labs, cxr, trop, bnp, abx, US LE ordered.

## 2022-09-21 NOTE — ED PROVIDER NOTE - NS ED ROS FT
Constitutional: +fevers  HEENT: no cough, no rhinorrhea  Cardiac: no chest pain  Resp: +shortness of breath  Abd: no abdominal pain  : no dysuria, no urinary frequency  Limbs: no peripheral edema  all other ROS systems negative    Physical Exam:  Constitutional: large well-appearing man, not in acute distress while on 3L O2 NC  HENT: atraumatic, normocephalic  Cardiac: normal S1 and S2, no murmurs or rubs  Resp: lungs clear to auscultation bilaterally, no rhonchi or rales  Abd: no tenderness to palpation, no guarding or rebound  : no suprapubic tenderness, +CVA tenderness  Neuro: alert and orientedx3

## 2022-09-21 NOTE — CONSULT NOTE ADULT - PROBLEM SELECTOR RECOMMENDATION 9
Patient found to have elevated high sensitivity troponin. EKG showed SR w/ RBBB, non specific st changes. ACS v type II MI in the setting of new ALFREDO, infectious process and copd exacerbation.   Plan   - Treat for ACS if clinical story changes or patient develops chest pain, ekg changes, rising trops.   - Serial EKG, PRN chest pain  - serial troponin, ck, ckmb  - risk factor profile to include TSH, HGBA1c, Lipid profile,   - Echo to evaluate LV function and wall motion abnormality Patient found to have elevated high sensitivity troponin without chest pain. EKG showed SR w/ RBBB, non specific st changes. ACS v type II MI in the setting of new ALFREDO, infectious process and copd exacerbation.   Plan   - Treat for ACS if clinical story changes or patient develops chest pain, ekg changes, rising trops.   - Serial EKG, PRN chest pain  - serial troponin, ck, ckmb  - risk factor profile to include TSH, HGBA1c, Lipid profile,   - Echo to evaluate LV function and wall motion abnormality Patient found to have elevated high sensitivity troponin without chest pain. EKG showed SR w/ RBBB, non specific st changes. ACS v type II MI in the setting of new ALFREDO, infectious process and copd exacerbation.   Plan   - Treat for ACS if clinical story changes or patient develops chest pain, ekg changes, rising trops.   - repeat EKG and  PRN chest pain  - serial troponin, ck, ckmb  - risk factor profile to include TSH, HGBA1c, Lipid profile,   - Echo to evaluate LV function and wall motion abnormality

## 2022-09-22 NOTE — H&P ADULT - NSHPLABSRESULTS_GEN_ALL_CORE
LABS:                        11.9   20.31 )-----------( 196      ( 21 Sep 2022 21:17 )             39.0         117<LL>  |  80<L>  |  38<H>  ----------------------------<  132<H>  5.5<H>   |  24  |  2.08<H>    Ca    8.6      21 Sep 2022 23:11  Mg     2.30         TPro  7.6  /  Alb  4.1  /  TBili  0.2  /  DBili  x   /  AST  123<H>  /  ALT  23  /  AlkPhos  142<H>      PT/INR - ( 21 Sep 2022 21:17 )   PT: 13.5 sec;   INR: 1.16 ratio         PTT - ( 21 Sep 2022 21:17 )  PTT:26.5 sec  CARDIAC MARKERS ( 21 Sep 2022 23:11 )  x     / x     / 1180 U/L / x     / 191.1 ng/mL      Urinalysis Basic - ( 22 Sep 2022 01:29 )    Color: Yellow / Appearance: Clear / S.017 / pH: x  Gluc: x / Ketone: Negative  / Bili: Negative / Urobili: <2 mg/dL   Blood: x / Protein: Trace / Nitrite: Negative   Leuk Esterase: Negative / RBC: x / WBC x   Sq Epi: x / Non Sq Epi: x / Bacteria: x      RADIOLOGY & ADDITIONAL TESTS:  New Imaging Personally Reviewed Today: CXR with large right pleural effusion (total opacification of R lung field)  New Electrocardiogram Personally Reviewed Today: NSR, chronic RBBB

## 2022-09-22 NOTE — PROCEDURE NOTE - SUPERVISORY STATEMENT
large right pleural effusion s/p drainage of >2100 mL of serous pleural fluid with subsequent improvement in respiratory status. Post-procedure lung ultrasound with presence of lung sliding and CXR without pneumothorax.

## 2022-09-22 NOTE — H&P ADULT - PROBLEM SELECTOR PLAN 10
Has recently cut back  - Smoking cessation counseling with patient able to have full conversation off BiPAP  - Ordered nicotine patch and nicotine lozenges PRN

## 2022-09-22 NOTE — H&P ADULT - PROBLEM SELECTOR PLAN 2
History of active smoking, no prior episodes of COPD exacerbation and not on any maintenance COPD medications prior to admission. Given increased SOB and sputum production and wheezing on exam reasonable to treat for COPD exacerbation  - Nebs, steroids, azithromycin as above  - Smoking cessation as below  - Prescribed albuterol inhaler PRN just prior to admission

## 2022-09-22 NOTE — CONSULT NOTE ADULT - ASSESSMENT
75 yo M with PMH of HTN, PVD, AAA s/p repair (2017), GERD, COPD, tobacco use disorder presented with SOB and found to be in acute hypercapnic respiratory distress. Chest Xray significant for complete opacification of right lung. MICU  consulted for new BiPaP.     Reccomendations:   - Patient mentating well, hemodynamically stable, pulling volumes and Saturating >95% while on Bipap   -c/w Bipap 10/5  -repeat VBG, if worsening gas can uptitrate IPAP as needed   -would benefit from a PULM consult in AM for opacification of right lung   -If respiratory status deteriorates, then can reconsult MICU.      Not a MICU Candidate at this time. Please Call MICU back with any questions/Concerns.    D/w Dr. Campo   77 yo M with PMH of HTN, PVD, AAA s/p repair (2017), GERD, COPD, tobacco use disorder presented with SOB and found to be in acute hypercapnic respiratory distress. Chest Xray significant for complete opacification of right lung. MICU  consulted for new BiPaP.     Reccomendations:   - Patient mentating well, hemodynamically stable, pulling volumes and Saturating >95% while on Bipap   -c/w Bipap 10/5  -repeat VBG, if worsening gas can uptitrate IPAP as needed   -would benefit from a PULM consult in AM for opacification of right lung   -If respiratory status deteriorates, then can reconsult MICU.      Not a MICU Candidate at this time. Please Call MICU back with any questions/Concerns.    D/w Dr. Campo

## 2022-09-22 NOTE — H&P ADULT - NSHPREVIEWOFSYSTEMS_GEN_ALL_CORE
Constitutional - no fevers/chills, weight loss, + poor appetite/PO intake  Pulmonary - + SOB, productive cough  Cardiac - no chest pain or chest pressure  GI - + constiptation (last BM 4 days PTA), no diarrhea

## 2022-09-22 NOTE — CONSULT NOTE ADULT - SUBJECTIVE AND OBJECTIVE BOX
CHIEF COMPLAINT:Patient is a 76y old  Male who presents with a chief complaint of     Interval Events:  77 yo M with PMH of HTN, PVD, AAA s/p repair (2017), GERD, COPD, tobacco use disorder presents to the ED for hypoxia and shortness of breath. Patient reports that he had been coughing up mucous for the past few days. He also reported shortness of breath. Denies chest pain, headache, nausea, vomiting, abdominal pain, fevers, or chills. The patient's daughter is a nurse and she reports that when she measured the patient's O2 sat at home it was in the 70s. She reports that he was also coughing up mucus and she brought him to the ED. Per daughter he finished antibiotics 3 days ago for pneumonia.     In the ED vitals were T: 98.1, HR: 73 BP: 102/89 RR:16 , 94% on 4L. Labs were significant for WBC:20K, Na:119-->117, Troponin: 1394-->1292, and pH 7.19/pCO2:75 on VBG. Pt received azithromycin x1, ceftriaxone x1, duonebs, IV lasix 40mg, and Solumedrol IV.  Chest Xray was significant for complete opacification of the right hemithorax and left lung was clear. VBG Patient was started on BiPap.    MICU consulted for new BiPaP overnight however did not require intubation or MICU admission.     REVIEW OF SYSTEMS:  [x] All other systems negative except per HPI   [ ] Unable to assess ROS because ________    OBJECTIVE:  ICU Vital Signs Last 24 Hrs  T(C): 36.2 (22 Sep 2022 07:30), Max: 36.7 (21 Sep 2022 22:54)  T(F): 97.1 (22 Sep 2022 07:30), Max: 98.1 (21 Sep 2022 22:54)  HR: 77 (22 Sep 2022 07:30) (70 - 77)  BP: 117/78 (22 Sep 2022 07:30) (102/89 - 123/71)  BP(mean): --  ABP: --  ABP(mean): --  RR: 16 (22 Sep 2022 07:30) (16 - 19)  SpO2: 96% (22 Sep 2022 07:30) (94% - 98%)    O2 Parameters below as of 22 Sep 2022 07:30  Patient On (Oxygen Delivery Method): BiPAP/CPAP          Mode: NIV (Noninvasive Ventilation), RR (machine): 18, TV (machine): 500, FiO2: 60, PEEP: 7, PC: 30, PIP: 15      PHYSICAL EXAM:  GENERAL: NAD, well-groomed, well-developed  HEAD:  Atraumatic, Normocephalic  EYES: EOMI, PERRLA, conjunctiva and sclera clear  ENMT: No tonsillar erythema, exudates, or enlargement; Moist mucous membranes, Good dentition, No lesions  NECK: Supple, No JVD, Normal thyroid  CHEST/LUNG: Clear to auscultation bilaterally; No rales, rhonchi, wheezing, or rubs  HEART: Regular rate and rhythm; No murmurs, rubs, or gallops  ABDOMEN: Soft, Nontender, Nondistended; Bowel sounds present  VASCULAR:  2+ Peripheral Pulses, No clubbing, cyanosis, or edema  LYMPH: No lymphadenopathy noted  SKIN: No rashes or lesions  NERVOUS SYSTEM:  Alert & Oriented X3, Good concentration; Motor Strength 5/5 B/L upper and lower extremities; DTRs 2+ intact and symmetric    HOSPITAL MEDICATIONS:  MEDICATIONS  (STANDING):    MEDICATIONS  (PRN):      LABS:    The Labs were reviewed by me   The Radiology was reviewed by me    EKG tracing reviewed by me        117<LL>  |  80<L>  |  38<H>  ----------------------------<  132<H>  5.5<H>   |  24  |  2.08<H>      119<LL>  |  81<L>  |  41<H>  ----------------------------<  115<H>  5.6<H>   |  25  |  2.29<H>    Ca    8.6      21 Sep 2022 23:11  Ca    8.7      21 Sep 2022 21:17  Mg     2.30         TPro  7.6  /  Alb  4.1  /  TBili  0.2  /  DBili  x   /  AST  123<H>  /  ALT  23  /  AlkPhos  142<H>      Magnesium, Serum: 2.30 mg/dL (22 @ 21:17)        PT/INR - ( 21 Sep 2022 21:17 )   PT: 13.5 sec;   INR: 1.16 ratio         PTT - ( 21 Sep 2022 21:17 )  PTT:26.5 sec              Urinalysis Basic - ( 22 Sep 2022 01:29 )    Color: Yellow / Appearance: Clear / S.017 / pH: x  Gluc: x / Ketone: Negative  / Bili: Negative / Urobili: <2 mg/dL   Blood: x / Protein: Trace / Nitrite: Negative   Leuk Esterase: Negative / RBC: x / WBC x   Sq Epi: x / Non Sq Epi: x / Bacteria: x                              11.9   20.31 )-----------( 196      ( 21 Sep 2022 21:17 )             39.0     CAPILLARY BLOOD GLUCOSE            MICROBIOLOGY:     RADIOLOGY:  [ ] Reviewed and interpreted by me    Point of Care Ultrasound Findings:    PFT:    EKG: CHIEF COMPLAINT:Patient is a 76y old  Male who presents with a chief complaint of     Interval Events:  77 yo M with PMH of HTN, PVD, AAA s/p repair (2017), GERD, COPD, tobacco use disorder presents to the ED for hypoxia and shortness of breath. Patient reports that he had been coughing up mucous for the past few days. He also reported shortness of breath. Denies chest pain, headache, nausea, vomiting, abdominal pain, fevers, or chills. The patient's daughter is a nurse and she reports that when she measured the patient's O2 sat at home it was in the 70s. She reports that he was also coughing up mucus and she brought him to the ED. Per daughter he finished antibiotics 3 days ago for pneumonia.     In the ED vitals were T: 98.1, HR: 73 BP: 102/89 RR:16 , 94% on 4L. Labs were significant for WBC:20K, Na:119-->117, Troponin: 1394-->1292, and pH 7.19/pCO2:75 on VBG. Pt received azithromycin x1, ceftriaxone x1, duonebs, IV lasix 40mg, and Solumedrol IV.  Chest Xray was significant for complete opacification of the right hemithorax and left lung was clear. VBG Patient was started on BiPap.    MICU consulted for new BiPaP overnight however did not require intubation or MICU admission.   Mr. Sharp is a 76 year old man with history of active smoking (recently cut back), COPD, lung cancer s/p QAMAR lobectomy (2019, [facility], no chemo/XRT), ?h/o DVT (off a/c since 2019, ?IVC filter placed in 2019), HTN, PVD/AAA s/p repair (2017) presenting with SOB/cough.    Patient only able to provide limited information given that he was using BiPAP during encounter, and deferred many questions to his daughter, Laurita, who is a med-surg RN. Pt and daughter report that symptoms started ~10 days ago and have predominantly included worsening SOB and cough productive of white sputum. SOB is severe, constant, and worse with exertion. Denies fever/chills, myalgias, changes in weight, URI symptoms, chest pain, hemoptysis, syncope. He describes poor PO intake over this time; he has constipation and denies any diarrhea. He was seen as an outpatient and prescribed levofloxacin x10 days, medrol dose pack, and albuterol inhaler on ; however, these interventions did not seem to help and his symptoms continued to worsen so he presented to the ED fur further evaluation and management.     In the ED found to have O2 sat 74% on RA --> 94% on 3L, VBG 7.19/75, troponin 1394, EKG chronic RBBB, Na 119, Cr 2.29, CXR large R pleural effusion.  Received ceftriaxone/azithromycin, solumedrol 125, duonebs, lasix 40 IV.  Cardiology consulted for elevated troponin, felt most likely 2/2 demand  MICU consulted for respiratory failure - recommended BiPAP 10/5, continue to trend VBG, pulm consult  Pulm consulted for pleural effusion and will evaluate the patient    REVIEW OF SYSTEMS:  [x] All other systems negative except per HPI   [ ] Unable to assess ROS because ________    OBJECTIVE:  ICU Vital Signs Last 24 Hrs  T(C): 36.2 (22 Sep 2022 07:30), Max: 36.7 (21 Sep 2022 22:54)  T(F): 97.1 (22 Sep 2022 07:30), Max: 98.1 (21 Sep 2022 22:54)  HR: 77 (22 Sep 2022 07:30) (70 - 77)  BP: 117/78 (22 Sep 2022 07:30) (102/89 - 123/71)  BP(mean): --  ABP: --  ABP(mean): --  RR: 16 (22 Sep 2022 07:30) (16 - 19)  SpO2: 96% (22 Sep 2022 07:30) (94% - 98%)    O2 Parameters below as of 22 Sep 2022 07:30  Patient On (Oxygen Delivery Method): BiPAP/CPAP          Mode: NIV (Noninvasive Ventilation), RR (machine): 18, TV (machine): 500, FiO2: 60, PEEP: 7, PC: 30, PIP: 15      PHYSICAL EXAM:  GENERAL: NAD, well-groomed, well-developed  HEAD:  Atraumatic, Normocephalic  EYES: EOMI, PERRLA, conjunctiva and sclera clear  ENMT: No tonsillar erythema, exudates, or enlargement; Moist mucous membranes, Good dentition, No lesions  NECK: Supple, No JVD, Normal thyroid  CHEST/LUNG: Clear to auscultation bilaterally; No rales, rhonchi, wheezing, or rubs  HEART: Regular rate and rhythm; No murmurs, rubs, or gallops  ABDOMEN: Soft, Nontender, Nondistended; Bowel sounds present  VASCULAR:  2+ Peripheral Pulses, No clubbing, cyanosis, or edema  LYMPH: No lymphadenopathy noted  SKIN: No rashes or lesions  NERVOUS SYSTEM:  Alert & Oriented X3, Good concentration; Motor Strength 5/5 B/L upper and lower extremities; DTRs 2+ intact and symmetric    HOSPITAL MEDICATIONS:  MEDICATIONS  (STANDING):    MEDICATIONS  (PRN):      LABS:    The Labs were reviewed by me   The Radiology was reviewed by me    EKG tracing reviewed by me        117<LL>  |  80<L>  |  38<H>  ----------------------------<  132<H>  5.5<H>   |  24  |  2.08<H>      119<LL>  |  81<L>  |  41<H>  ----------------------------<  115<H>  5.6<H>   |  25  |  2.29<H>    Ca    8.6      21 Sep 2022 23:11  Ca    8.7      21 Sep 2022 21:17  Mg     2.30         TPro  7.6  /  Alb  4.1  /  TBili  0.2  /  DBili  x   /  AST  123<H>  /  ALT  23  /  AlkPhos  142<H>      Magnesium, Serum: 2.30 mg/dL (22 @ 21:17)        PT/INR - ( 21 Sep 2022 21:17 )   PT: 13.5 sec;   INR: 1.16 ratio         PTT - ( 21 Sep 2022 21:17 )  PTT:26.5 sec              Urinalysis Basic - ( 22 Sep 2022 01:29 )    Color: Yellow / Appearance: Clear / S.017 / pH: x  Gluc: x / Ketone: Negative  / Bili: Negative / Urobili: <2 mg/dL   Blood: x / Protein: Trace / Nitrite: Negative   Leuk Esterase: Negative / RBC: x / WBC x   Sq Epi: x / Non Sq Epi: x / Bacteria: x                              11.9   20.31 )-----------( 196      ( 21 Sep 2022 21:17 )             39.0     CAPILLARY BLOOD GLUCOSE            MICROBIOLOGY:     RADIOLOGY:  [ ] Reviewed and interpreted by me    Point of Care Ultrasound Findings:    PFT:    EKG:

## 2022-09-22 NOTE — H&P ADULT - PROBLEM SELECTOR PLAN 6
K 5.6 --> 5.5 with mildly hemolyzed specimen. Most likely due to hemolyzed specimen, with possible contribution from ALFREDO. No e/o hyperkalemia on EKG.  - LR bolus as above  - Repeat BMP this afternoon  - If persistently elevated and specimen without hemolysis would treat

## 2022-09-22 NOTE — H&P ADULT - PROBLEM SELECTOR PLAN 11
Discussed my concern given patient's persistent acute hypercarbic/hypoxemic respiratory failure despite BiPAP and significant lab derrangements with the patient and his daughter, Laurita, who is his HCP. They both communicated to me that should Mr. Shrap's condition worsen he is FULL CODE for a time-limited trial of interventions. Laurita will try to bring in HCP form today for our records.  - full code

## 2022-09-22 NOTE — H&P ADULT - CONVERSATION DETAILS
Discussed my concern given patient's persistent acute hypercarbic/hypoxemic respiratory failure despite BiPAP and significant lab derangements with the patient and his daughter, Laurita, who is his HCP. They both communicated to me that should Mr. Sharp's condition worsen he is FULL CODE for a time-limited trial of interventions. Laurita will try to bring in HCP form today for our records.

## 2022-09-22 NOTE — H&P ADULT - PROBLEM SELECTOR PLAN 5
Na 119 on admission downtrended to 117 after lasix 40 IV in the ED. Most likely hypovolemic hyponatremia in setting of poor PO intake  - Check urine osm and urine Na  -  cc bolus now  - Repeat BMP this afternoon, Q4 hour Na checks  - Goal Na 125 by the end of the day today, monitor closely to avoid overcorrection  - Consider renal consult if not improving as expected

## 2022-09-22 NOTE — H&P ADULT - PROBLEM SELECTOR PLAN 4
Cr 2.29 on admission --> 2.0 on repeat labs. Making urine. Received lasix 40 IV in the ED. Most likely pre-renal ALFREDO in setting of poor PO intake.  -  cc bolus now (will use caution given respiratory status  - Repeat BMP this afternoon

## 2022-09-22 NOTE — H&P ADULT - PROBLEM SELECTOR PLAN 1
Patient is an active smoker with history of [place] [facility] [place]did not receive chemo/XRT). P/w SOB/productive cough x 2 weeks that did not improve with course of levofloxacin/medrol dose pack. +Wheezing on exam. Labs with WBC 20, respiratory acidosis, COVID negative. Imaging with large R pleural effusion. Pleural effusion is likely etiology of respiratory failure - etiology of pleural effusion less clear - infectious vs malignancy vs less likely inflammatory.   - Continue BiPAP 10/5, respiratory therapy to evaluate for air leak   - Trend VBG Q4H for now  - Obtain blood/sputum cultures, legionella urine ag, MRSA PCR  - Check procalcitonin  - Discussed with pulmonary fellow re: likely thoracentesis today  - Continue ceftriaxone/azithro to cover for CAP (per 2019 IDSA CAP guidelines, no indication to broaden at this time, but will obtain infectious workup as above)  - S/p solumedrol 125 IV in the ED, continue prednisone 40 mg daily for component of COPD exacerbation  - Duonebs Q6H standing  - Pulmonary consulted, appreciate recommendations and consideration of thoracentesis Patient is an active smoker with history [place] [facility] [place] receive chemo/[place]). P/w SOB/productive cough x 2 weeks that did not improve with course of levofloxacin/medrol dose pack. +Wheezing on exam. Labs with WBC 20, respiratory acidosis, COVID negative. Imaging with large R pleural effusion. Pleural effusion is likely etiology of respiratory failure - etiology of pleural effusion less clear - infectious vs malignancy vs less likely inflammatory. Given unclear history of DVT (was previously on warfarin in [id number] after vascular surgery - stopped due to airway bleeding [place] [facility] [place] some mention in [place] of [place] [facility] [place] and degree of hypoxemia, some concern for PE contributing to resp failure - plan to re-assess respiratory status after thoracentesis to determine whether CTA chest is warranted.  - Continue BiPAP 10/5, respiratory therapy to evaluate for air leak   - Trend VBG Q4H for now  - Obtain blood/sputum cultures, legionella urine ag, MRSA PCR  - Check procalcitonin  - Discussed with pulmonary fellow re: likely thoracentesis today  - Reassess respiratory status after thoracentesis to determine whether to pursue further imaging with CTA  - Continue ceftriaxone/azithro to cover for CAP (per 2019 IDSA CAP guidelines, no indication to broaden at this time, but will obtain infectious workup as above)  - S/p solumedrol 125 IV in the ED, continue prednisone 40 mg daily for component of COPD exacerbation  - Duonebs Q6H standing  - Pulmonary consulted, appreciate recommendations and consideration of thoracentesis Patient is an active smoker with history of QAMAR lobectomy for lung cancer at [facility] [place] in 2019 (did not receive chemo/XRT). P/w SOB/productive cough x 2 weeks that did not improve with course of levofloxacin/medrol dose pack. +Wheezing on exam. Labs with WBC 20, respiratory acidosis, COVID negative. Imaging with large R pleural effusion. Pleural effusion is likely etiology of respiratory failure - etiology of pleural effusion less clear - infectious vs malignancy vs less likely inflammatory. Given unclear history of DVT (was previously on warfarin in [id number] after vascular surgery - stopped due to airway bleeding after lobectomy surgery at [facility] [place] in 2019, some mention in chart of IVC filter placement at [facility] [place] in 2019) and degree of hypoxemia, some concern for PE contributing to resp failure - plan to re-assess respiratory status after thoracentesis to determine whether CTA chest is warranted.  - Continue BiPAP 10/5, respiratory therapy to evaluate for air leak   - Trend VBG Q4H for now  - Obtain blood/sputum cultures, legionella urine ag, MRSA PCR  - Check procalcitonin  - Discussed with pulmonary fellow re: likely thoracentesis today  - Send pleural fluid for cell count/diff, protein, LDH, glucose, GS/culture, cytology, any further studies per pulmonary  - Reassess respiratory status after thoracentesis to determine whether to pursue further imaging with CTA  - Continue ceftriaxone/azithro to cover for CAP (per 2019 IDSA CAP guidelines, no indication to broaden at this time, but will obtain infectious workup as above)  - S/p solumedrol 125 IV in the ED, continue prednisone 40 mg daily for component of COPD exacerbation  - Duonebs Q6H standing  - Pulmonary consulted, appreciate recommendations and consideration of thoracentesis

## 2022-09-22 NOTE — PROCEDURE NOTE - NSINFORMCONSENT_GEN_A_CORE
from daughter who is HCP and patient deferring/Benefits, risks, and possible complications of procedure explained to patient/caregiver who verbalized understanding and gave written consent.

## 2022-09-22 NOTE — CONSULT NOTE ADULT - SUBJECTIVE AND OBJECTIVE BOX
CHIEF COMPLAINT: SOB    HPI:    75 yo M with PMH of HTN, PVD, AAA s/p repair (2017), GERD, COPD, tobacco use disorder presents to the ED for hypoxia and shortness of breath. Patient reports that he had been coughing up mucous for the past few days. He also reported shortness of breath. Denies chest pain, headache, nausea, vomiting, abdominal pain, fevers, or chills. The patient's daughter is a nurse and she reports that when she measured the patient's O2 sat at home it was in the 70s. She reports that he was also coughing up mucus and she brought him to the ED. Per daughter he finished antibiotics 3 days ago for pneumonia.     In the ED vitals were T: 98.1, HR: 73 BP: 102/89 RR:16 , 94% on 4L. Labs were significant for WBC:20K, Na:119-->117, Troponin: 1394-->1292.Pt received azithromycin x1, ceftriaxone x1, duonebs, IV lasix 40mg, and Solumedrol IV. Chest Xray was significant for complete opacification of the right hemithorax and left lung was clear. Patient was started on BiPap.    MICU consulted for new BiPaP.         FAMILY HISTORY:      SOCIAL HISTORY:  Smoking: __ packs x ___ years  EtOH Use:  Marital Status:  Occupation:  Recent Travel:  Country of Birth:  Advance Directives:    Allergies    No Known Allergies    Intolerances        HOME MEDICATIONS:    REVIEW OF SYSTEMS:  Constitutional:   Eyes:  ENT:  CV:  Resp:  GI:  :  MSK:  Integumentary:  Neurological:  Psychiatric:  Endocrine:  Hematologic/Lymphatic:  Allergic/Immunologic:  [ ] All other systems negative  [ ] Unable to assess ROS because ________    OBJECTIVE:  ICU Vital Signs Last 24 Hrs  T(C): 36.7 (21 Sep 2022 22:54), Max: 36.7 (21 Sep 2022 22:54)  T(F): 98.1 (21 Sep 2022 22:54), Max: 98.1 (21 Sep 2022 22:54)  HR: 72 (21 Sep 2022 22:54) (72 - 73)  BP: 122/67 (21 Sep 2022 22:54) (102/89 - 122/67)  BP(mean): --  ABP: --  ABP(mean): --  RR: 19 (21 Sep 2022 22:54) (16 - 19)  SpO2: 98% (21 Sep 2022 22:54) (94% - 98%)    O2 Parameters below as of 21 Sep 2022 22:54  Patient On (Oxygen Delivery Method): BiPAP/CPAP              CAPILLARY BLOOD GLUCOSE          PHYSICAL EXAM:  General:   HEENT:   Lymph Nodes:  Neck:   Respiratory:   Cardiovascular:   Abdomen:   Extremities:   Skin:   Neurological:  Psychiatry:    HOSPITAL MEDICATIONS:  MEDICATIONS  (STANDING):    MEDICATIONS  (PRN):      LABS:                        11.9   20.31 )-----------( 196      ( 21 Sep 2022 21:17 )             39.0     09-21    117<LL>  |  80<L>  |  38<H>  ----------------------------<  132<H>  5.5<H>   |  24  |  2.08<H>    Ca    8.6      21 Sep 2022 23:11  Mg     2.30     09-21    TPro  7.6  /  Alb  4.1  /  TBili  0.2  /  DBili  x   /  AST  123<H>  /  ALT  23  /  AlkPhos  142<H>  09-21    PT/INR - ( 21 Sep 2022 21:17 )   PT: 13.5 sec;   INR: 1.16 ratio         PTT - ( 21 Sep 2022 21:17 )  PTT:26.5 sec      Venous Blood Gas:  09-21 @ 21:17  7.19/75/31/29/39.0  VBG Lactate: 1.9      MICROBIOLOGY:     RADIOLOGY:  [ ] Reviewed and interpreted by me    EKG: CHIEF COMPLAINT: SOB    HPI:    75 yo M with PMH of HTN, PVD, AAA s/p repair (2017), GERD, COPD, tobacco use disorder presents to the ED for hypoxia and shortness of breath. Patient reports that he had been coughing up mucous for the past few days. He also reported shortness of breath. Denies chest pain, headache, nausea, vomiting, abdominal pain, fevers, or chills. The patient's daughter is a nurse and she reports that when she measured the patient's O2 sat at home it was in the 70s. She reports that he was also coughing up mucus and she brought him to the ED. Per daughter he finished antibiotics 3 days ago for pneumonia.     In the ED vitals were T: 98.1, HR: 73 BP: 102/89 RR:16 , 94% on 4L. Labs were significant for WBC:20K, Na:119-->117, Troponin: 1394-->1292, and pH 7.19/pCO2:75 on VBG. Pt received azithromycin x1, ceftriaxone x1, duonebs, IV lasix 40mg, and Solumedrol IV.  Chest Xray was significant for complete opacification of the right hemithorax and left lung was clear. VBG Patient was started on BiPap.    MICU consulted for new BiPaP.         FAMILY HISTORY:      SOCIAL HISTORY:  Smoking: __ packs x ___ years  EtOH Use:  Marital Status:  Occupation:  Recent Travel:  Country of Birth:  Advance Directives:    Allergies    No Known Allergies    Intolerances        HOME MEDICATIONS:    REVIEW OF SYSTEMS:  Constitutional:   Eyes:  ENT:  CV:  Resp:  GI:  :  MSK:  Integumentary:  Neurological:  Psychiatric:  Endocrine:  Hematologic/Lymphatic:  Allergic/Immunologic:  [ ] All other systems negative  [ ] Unable to assess ROS because ________    OBJECTIVE:  ICU Vital Signs Last 24 Hrs  T(C): 36.7 (21 Sep 2022 22:54), Max: 36.7 (21 Sep 2022 22:54)  T(F): 98.1 (21 Sep 2022 22:54), Max: 98.1 (21 Sep 2022 22:54)  HR: 72 (21 Sep 2022 22:54) (72 - 73)  BP: 122/67 (21 Sep 2022 22:54) (102/89 - 122/67)  BP(mean): --  ABP: --  ABP(mean): --  RR: 19 (21 Sep 2022 22:54) (16 - 19)  SpO2: 98% (21 Sep 2022 22:54) (94% - 98%)    O2 Parameters below as of 21 Sep 2022 22:54  Patient On (Oxygen Delivery Method): BiPAP/CPAP              CAPILLARY BLOOD GLUCOSE            HOSPITAL MEDICATIONS:  MEDICATIONS  (STANDING):    MEDICATIONS  (PRN):      LABS:                        11.9   20.31 )-----------( 196      ( 21 Sep 2022 21:17 )             39.0     09-21    117<LL>  |  80<L>  |  38<H>  ----------------------------<  132<H>  5.5<H>   |  24  |  2.08<H>    Ca    8.6      21 Sep 2022 23:11  Mg     2.30     09-21    TPro  7.6  /  Alb  4.1  /  TBili  0.2  /  DBili  x   /  AST  123<H>  /  ALT  23  /  AlkPhos  142<H>  09-21    PT/INR - ( 21 Sep 2022 21:17 )   PT: 13.5 sec;   INR: 1.16 ratio         PTT - ( 21 Sep 2022 21:17 )  PTT:26.5 sec      Venous Blood Gas:  09-21 @ 21:17  7.19/75/31/29/39.0  VBG Lactate: 1.9      MICROBIOLOGY:     RADIOLOGY:      ******PRELIMINARY REPORT******           INTERPRETATION:  EXAMINATION: XR CHEST URGENT    CLINICAL INDICATION: Sepsis    TECHNIQUE: Single frontal, portable view of the chest was obtained.    COMPARISON: Chest radiograph 12/13/2017.    FINDINGS:  Complete opacification of the right hemithorax. The left lung is clear.   No left pleural effusion or pneumothorax.    IMPRESSION:  Complete opacification of the right hemithorax            [x ] Reviewed and interpreted by me     CHIEF COMPLAINT: SOB    HPI:    75 yo M with PMH of HTN, PVD, AAA s/p repair (2017), GERD, COPD, tobacco use disorder presents to the ED for hypoxia and shortness of breath. Patient reports that he had been coughing up mucous for the past few days. He also reported shortness of breath. Denies chest pain, headache, nausea, vomiting, abdominal pain, fevers, or chills. The patient's daughter is a nurse and she reports that when she measured the patient's O2 sat at home it was in the 70s. She reports that he was also coughing up mucus and she brought him to the ED. Per daughter he finished antibiotics 3 days ago for pneumonia.     In the ED vitals were T: 98.1, HR: 73 BP: 102/89 RR:16 , 94% on 4L. Labs were significant for WBC:20K, Na:119-->117, Troponin: 1394-->1292, and pH 7.19/pCO2:75 on VBG. Pt received azithromycin x1, ceftriaxone x1, duonebs, IV lasix 40mg, and Solumedrol IV.  Chest Xray was significant for complete opacification of the right hemithorax and left lung was clear. VBG Patient was started on BiPap.    MICU consulted for new BiPaP.         FAMILY HISTORY:      SOCIAL HISTORY:  Smoking: __ packs x ___ years  EtOH Use:  Marital Status:  Occupation:  Recent Travel:  Country of Birth:  Advance Directives:    Allergies    No Known Allergies    Intolerances        HOME MEDICATIONS:    REVIEW OF SYSTEMS:  Constitutional:   Eyes:  ENT:  CV:  Resp:  GI:  :  MSK:  Integumentary:  Neurological:  Psychiatric:  Endocrine:  Hematologic/Lymphatic:  Allergic/Immunologic:  [ ] All other systems negative  [ ] Unable to assess ROS because ________    OBJECTIVE:  ICU Vital Signs Last 24 Hrs  T(C): 36.7 (21 Sep 2022 22:54), Max: 36.7 (21 Sep 2022 22:54)  T(F): 98.1 (21 Sep 2022 22:54), Max: 98.1 (21 Sep 2022 22:54)  HR: 72 (21 Sep 2022 22:54) (72 - 73)  BP: 122/67 (21 Sep 2022 22:54) (102/89 - 122/67)  BP(mean): --  ABP: --  ABP(mean): --  RR: 19 (21 Sep 2022 22:54) (16 - 19)  SpO2: 98% (21 Sep 2022 22:54) (94% - 98%)    O2 Parameters below as of 21 Sep 2022 22:54  Patient On (Oxygen Delivery Method): BiPAP/CPAP              CAPILLARY BLOOD GLUCOSE            HOSPITAL MEDICATIONS:  MEDICATIONS  (STANDING):    MEDICATIONS  (PRN):      LABS:                        11.9   20.31 )-----------( 196      ( 21 Sep 2022 21:17 )             39.0     09-21    117<LL>  |  80<L>  |  38<H>  ----------------------------<  132<H>  5.5<H>   |  24  |  2.08<H>    Ca    8.6      21 Sep 2022 23:11  Mg     2.30     09-21    TPro  7.6  /  Alb  4.1  /  TBili  0.2  /  DBili  x   /  AST  123<H>  /  ALT  23  /  AlkPhos  142<H>  09-21    PT/INR - ( 21 Sep 2022 21:17 )   PT: 13.5 sec;   INR: 1.16 ratio         PTT - ( 21 Sep 2022 21:17 )  PTT:26.5 sec      Venous Blood Gas:  09-21 @ 21:17  7.19/75/31/29/39.0  VBG Lactate: 1.9      MICROBIOLOGY:     RADIOLOGY:      ******PRELIMINARY REPORT******           INTERPRETATION:  EXAMINATION: XR CHEST URGENT    CLINICAL INDICATION: Sepsis    TECHNIQUE: Single frontal, portable view of the chest was obtained.    COMPARISON: Chest radiograph 12/13/2017.    FINDINGS:  Complete opacification of the right hemithorax. The left lung is clear.   No left pleural effusion or pneumothorax.    IMPRESSION:  Complete opacification of the right hemithorax    [x ] Reviewed and interpreted by me      CHIEF COMPLAINT:    HPI:    PAST MEDICAL & SURGICAL HISTORY:  HTN (hypertension)      GERD (gastroesophageal reflux disease)      PVD (peripheral vascular disease)      AAA (abdominal aortic aneurysm)  s/p repair in 12/2017      Smoker      Endoleak post (EVAR) endovascular aneurysm repair  12/2017      S/P ORIF (open reduction internal fixation) fracture  Right ankle, distant past      S/P cataract surgery  bilateral          FAMILY HISTORY:      SOCIAL HISTORY:      Allergies    No Known Allergies    Intolerances        HOME MEDICATIONS:    REVIEW OF SYSTEMS:  General: Denies dizziness, fatigue  Eyes: Denies blurry vision  ENMT: Denies rhinorrhea  Respiratory: Denies cough, SOB  Cardiovascular: Denies palpitations, CP  Gastrointestinal: Denies abd pain, N/V/D/C, hematochezia, melena  : Denies dysuria, increased freq  Musculoskeletal: Denies edema, joint pain  Endocrine: Denies increased thirst, increased frequency  Allergic/Immunologic: Denies rashes or hives  Neuro: Denies weakness, numbness  Psych: Denies anxiety, depression  All ROS negative unless indicated above     OBJECTIVE:  ICU Vital Signs Last 24 Hrs  T(C): 36.7 (21 Sep 2022 22:54), Max: 36.7 (21 Sep 2022 22:54)  T(F): 98.1 (21 Sep 2022 22:54), Max: 98.1 (21 Sep 2022 22:54)  HR: 70 (22 Sep 2022 01:35) (70 - 73)  BP: 112/81 (22 Sep 2022 01:35) (102/89 - 122/67)  BP(mean): --  ABP: --  ABP(mean): --  RR: 18 (22 Sep 2022 01:35) (16 - 19)  SpO2: 97% (22 Sep 2022 01:35) (94% - 98%)    O2 Parameters below as of 22 Sep 2022 01:35  Patient On (Oxygen Delivery Method): BiPAP/CPAP              CAPILLARY BLOOD GLUCOSE          PHYSICAL EXAM:  CONSTITUTIONAL: NAD; well-developed  HEENT: PERRL, clear conjunctiva  RESPIRATORY: Normal respiratory effort; lungs are clear to auscultation bilaterally; No Crackles/Rhonchi/Wheezing  CARDIOVASCULAR: Regular rate and rhythm, normal S1 and S2, no murmur/rub/gallop; No lower extremity edema; Peripheral pulses are 2+ bilaterally  ABDOMEN: Nontender to palpation, normoactive bowel sounds, no rebound/guarding; No hepatosplenomegaly  MUSCULOSKELETAL: no clubbing or cyanosis of digits; no joint swelling or tenderness to palpation  EXTREMITY: Lower extremities Non-tender to palpation; non-erythematous B/L  NEURO: A&Ox3; no focal deficits   PSYCH: normal mood; Affect appropirate    HOSPITAL MEDICATIONS:  MEDICATIONS  (STANDING):    MEDICATIONS  (PRN):      LABS:                        11.9   20.31 )-----------( 196      ( 21 Sep 2022 21:17 )             39.0     09-21    117<LL>  |  80<L>  |  38<H>  ----------------------------<  132<H>  5.5<H>   |  24  |  2.08<H>    Ca    8.6      21 Sep 2022 23:11  Mg     2.30     09-21    TPro  7.6  /  Alb  4.1  /  TBili  0.2  /  DBili  x   /  AST  123<H>  /  ALT  23  /  AlkPhos  142<H>  09-21    PT/INR - ( 21 Sep 2022 21:17 )   PT: 13.5 sec;   INR: 1.16 ratio         PTT - ( 21 Sep 2022 21:17 )  PTT:26.5 sec      Venous Blood Gas:  09-21 @ 21:17  7.19/75/31/29/39.0  VBG Lactate: 1.9      MICROBIOLOGY:     RADIOLOGY:  [ ] Reviewed and interpreted by me    EKG: Reviewed      CHIEF COMPLAINT: SOB    HPI:    77 yo M with PMH of HTN, PVD, AAA s/p repair (2017), GERD, COPD, tobacco use disorder presents to the ED for hypoxia and shortness of breath. Patient reports that he had been coughing up mucous for the past few days. He also reported shortness of breath. Denies chest pain, headache, nausea, vomiting, abdominal pain, fevers, or chills. The patient's daughter is a nurse and she reports that when she measured the patient's O2 sat at home it was in the 70s. She reports that he was also coughing up mucus and she brought him to the ED. Per daughter he finished antibiotics 3 days ago for pneumonia.     In the ED vitals were T: 98.1, HR: 73 BP: 102/89 RR:16 , 94% on 4L. Labs were significant for WBC:20K, Na:119-->117, Troponin: 1394-->1292, and pH 7.19/pCO2:75 on VBG. Pt received azithromycin x1, ceftriaxone x1, duonebs, IV lasix 40mg, and Solumedrol IV.  Chest Xray was significant for complete opacification of the right hemithorax and left lung was clear. VBG Patient was started on BiPap.    MICU consulted for new BiPaP.         PAST MEDICAL & SURGICAL HISTORY:  HTN (hypertension)      GERD (gastroesophageal reflux disease)      PVD (peripheral vascular disease)      AAA (abdominal aortic aneurysm)  s/p repair in 12/2017      Smoker      Endoleak post (EVAR) endovascular aneurysm repair  12/2017      S/P ORIF (open reduction internal fixation) fracture  Right ankle, distant past      S/P cataract surgery  bilateral          FAMILY HISTORY:      SOCIAL HISTORY:      Allergies    No Known Allergies    Intolerances        HOME MEDICATIONS:    REVIEW OF SYSTEMS:  General: Denies dizziness, fatigue  Eyes: Denies blurry vision  ENMT: Denies rhinorrhea  Respiratory: +cough, +SOB  Cardiovascular: Denies palpitations, CP  Gastrointestinal: Denies abd pain, N/V/D/C, hematochezia, melena  : Denies dysuria, increased freq  Musculoskeletal: +LE edema, denies joint pain  Endocrine: Denies increased thirst, increased frequency  Allergic/Immunologic: Denies rashes or hives  Neuro: Denies weakness, numbness  Psych: Denies anxiety, depression  All ROS negative unless indicated above     OBJECTIVE:  ICU Vital Signs Last 24 Hrs  T(C): 36.7 (21 Sep 2022 22:54), Max: 36.7 (21 Sep 2022 22:54)  T(F): 98.1 (21 Sep 2022 22:54), Max: 98.1 (21 Sep 2022 22:54)  HR: 70 (22 Sep 2022 01:35) (70 - 73)  BP: 112/81 (22 Sep 2022 01:35) (102/89 - 122/67)  BP(mean): --  ABP: --  ABP(mean): --  RR: 18 (22 Sep 2022 01:35) (16 - 19)  SpO2: 97% (22 Sep 2022 01:35) (94% - 98%)    O2 Parameters below as of 22 Sep 2022 01:35  Patient On (Oxygen Delivery Method): BiPAP/CPAP              CAPILLARY BLOOD GLUCOSE          PHYSICAL EXAM:  CONSTITUTIONAL: NAD; well-developed on Bipap  HEENT: PERRL, clear conjunctiva  RESPIRATORY: Normal respiratory effort; +b/l Rhonci, no wheezing, or crackles   CARDIOVASCULAR: Regular rate and rhythm, normal S1 and S2, no murmur/rub/gallop; No lower extremity edema; Peripheral pulses are 2+ bilaterally  ABDOMEN: Nontender to palpation, normoactive bowel sounds, no rebound/guarding; No hepatosplenomegaly  MUSCULOSKELETAL: no clubbing or cyanosis of digits; no joint swelling or tenderness to palpation  EXTREMITY: Lower extremities Non-tender to palpation; B/L lower extremity edema  NEURO: A&Ox3; no focal deficits   PSYCH: normal mood; Affect appropirate    HOSPITAL MEDICATIONS:  MEDICATIONS  (STANDING):    MEDICATIONS  (PRN):      LABS:                        11.9   20.31 )-----------( 196      ( 21 Sep 2022 21:17 )             39.0     09-21    117<LL>  |  80<L>  |  38<H>  ----------------------------<  132<H>  5.5<H>   |  24  |  2.08<H>    Ca    8.6      21 Sep 2022 23:11  Mg     2.30     09-21    TPro  7.6  /  Alb  4.1  /  TBili  0.2  /  DBili  x   /  AST  123<H>  /  ALT  23  /  AlkPhos  142<H>  09-21    PT/INR - ( 21 Sep 2022 21:17 )   PT: 13.5 sec;   INR: 1.16 ratio         PTT - ( 21 Sep 2022 21:17 )  PTT:26.5 sec      Venous Blood Gas:  09-21 @ 21:17  7.19/75/31/29/39.0  VBG Lactate: 1.9      MICROBIOLOGY:     RADIOLOGY:    ******PRELIMINARY REPORT******       INTERPRETATION:  EXAMINATION: XR CHEST URGENT    CLINICAL INDICATION: Sepsis    TECHNIQUE: Single frontal, portable view of the chest was obtained.    COMPARISON: Chest radiograph 12/13/2017.    FINDINGS:  Complete opacification of the right hemithorax. The left lung is clear.   No left pleural effusion or pneumothorax.    IMPRESSION:  Complete opacification of the right hemithorax    [x ] Reviewed and interpreted by me  EKG: Reviewed

## 2022-09-22 NOTE — H&P ADULT - NSHPPHYSICALEXAM_GEN_ALL_CORE
GENERAL: Well-appearing, No acute distress, BiPAP in place  HEAD:  Atraumatic, Normocephalic  EYES: EOMI, PERRLA, conjunctiva and sclera clear  NECK: Supple, JVP not able to be assessed due to habitus, no cervical, supraclavicular/infraclavicular LAD  CHEST/LUNG: Normal work of breathing (with BiPAP on); decreased breath sounds over R lung field with dullness to percussion; good air movement over left lung fields with scattered expiratory wheezing  HEART: S1/S2, Regular rate and rhythm; no murmurs, rubs, or gallops (difficult to hear given noise from BiPAP)  ABDOMEN: Mildly distended, NABS, soft,  nontender, no organomegaly, no peritoneal signs  EXTREMITIES:  2+ Peripheral Pulses, pitting edema bilaterally to mid shin R>L (chronic per patient)  PSYCH: AAOx3, normal affect  NEUROLOGY: CN II-XII intact, 5/5 strength in upper and lower extremities  SKIN: No rashes or lesions on examined skin; +Chronic venous stasis changes over bilateral lower legs

## 2022-09-22 NOTE — H&P ADULT - PROBLEM SELECTOR PLAN 3
Presented with troponin 1394, now downtrending. EKG chronic RBBB, denies chest pain/pressure.   - Cardiology consulted in the ED, felt mosst likely type II NSTEMI due to demand from respiratory failure  - Tropnin now downtrending, no need to continue to trend  - HgbA1c, lipids, TSH ordered for risk stratification per cardiology  - TTE ordered to evaluate cardiac function per cardiology

## 2022-09-22 NOTE — H&P ADULT - ASSESSMENT
76M active smoker, COPD, lung cancer s/p QAMAR lobectomy, remote DVT (not on a/c), HTN, GERD presenting with SOB and productive cough x2 weeks found to have large R pleural effusion, hypercarbic respiratory failure, ALFREDO, severe hyponatremia.

## 2022-09-22 NOTE — CONSULT NOTE ADULT - ASSESSMENT
76 year old male with history of active smoking 3weeks ago quit, COPD not on controller, lung cancer s/p QAMAR lobectomy (2019, [facility], no chemo/XRT), h/o DVT (off a/c since 2019 with IVC filter placed in 2019), HTN, PVD/AAA s/p repair (2017) presenting with SOB/cough and found to have large right pleural effusion    #large right pleural effusion   #hypoxemia  #hypercapnia  #COPD exacerbation  #PNA    recommendations  POCUS performed: large right simple appearing pleural effusion, with safe pocket  consent obtained after discussion with Laurita  diagnostic and therapeutic 2.1 L thoracentesis performed  post thora ultrasound with residual trace pleural effusion, has lung sliding  patient able to be taken off bipap to NC post procedure  COPD: continue duoneb q6, prednisone 40 for 5 days total, please start symbicort, once duoneb made PRN after improvement then start atrovent  PNA: continue ceftriaxone/azithroymcin and send sputum culture, MRSA nasal swab, urine legionella  please obtain ECHO  please continue AVAPs overnight tonight, can transition to NC during the day  if does not hypoexmia does not improve then will need to consider CTA (given history however has IVC filter so low suscpicion) however can consider CT chest non con in light of ALFREDO if patient continues to improve   will need to follow up with pulm as an outpatient  pulm will continue to follow    Mat Dacosta MD  [facility] PGY4  [facility] [address], [facility] [phone]     76 year old male with history of active smoking 3weeks ago quit, COPD not on controller, lung cancer s/p QAMAR lobectomy (2019, [facility], no chemo/XRT), h/o DVT (off a/c since 2019 with IVC filter placed in 2019), HTN, PVD/AAA s/p repair (2017) presenting with SOB/cough and found to have large right pleural effusion    #large right pleural effusion   #hypoxemia  #hypercapnia  #COPD exacerbation  #PNA    recommendations  POCUS performed: large right simple appearing pleural effusion, with safe pocket  consent obtained after discussion with Laurita  diagnostic and therapeutic 2.1 L thoracentesis performed  post thora ultrasound with residual trace pleural effusion, has lung sliding  patient able to be taken off bipap to NC post procedure  COPD: continue duoneb q6, prednisone 40 for 5 days total, please start symbicort, once duoneb made PRN after improvement then start atrovent  PNA: continue ceftriaxone/azithroymcin and send sputum culture, MRSA nasal swab, urine legionella  please obtain ECHO  please continue AVAPs overnight tonight settings: , EPAP 7, RATE 18, MIN 7, MAX 25, RISE 3, O2 30% (can titrate as necessary), can transition to NC during the day  if does not hypoexmia does not improve then will need to consider CTA (given history however has IVC filter so low suscpicion) however can consider CT chest non con in light of ALFREDO if patient continues to improve   will need to follow up with pulm as an outpatient  pulm will continue to follow    Mat Dacosta MD  [facility] PGY4  [facility] [address], [facility] [phone]

## 2022-09-22 NOTE — ED ADULT NURSE REASSESSMENT NOTE - NS ED NURSE REASSESS COMMENT FT1
Break coverage RN: Pt A&Ox3 resting on stretcher. Respirations even and unlabored, sating 99% on BIPAP. Pt well appearing, NAD noted. Offers no complaints. bed in lowest position, side rails up, call bell in hand, safety maintained.
Patient evaluated by team. 2 liters of fluid drained from his lung. Patient trialing off of Bipap and is being monitored. Patient is more comfortable and waiting for bed assignment.   OSMANY Galvan
Pt oxygen saturation noted to be 89% on RA. Respirations even and unlabored, NSR on monitor. Pt appears comfortable. Denies any chest pain or sob. Pt offers no complaints at this time. Provider paged to make aware.
Pt verbalizes improvement in SOB symptoms. States Bipap helped. Denies CP, SOB, headache, nausea, vomiting, fever or chills. Medicated as ordered. V/S charted. NSR on bedside cardiac monitor. Repeat labs drawn and sent as ordered. Urine sent as ordered. Bed in lowest position, call bell in reach, wheels locked, side rails up, safety maintained. Awaiting further orders.
Received report from PM RN. Patient is alert and oriented times four. Patient has bipap in progress. Medications given as ordered. Pt is NPO at this time and comfort care given. Patient is admitted and waiting for bed assignment.   OSMANY Galvan
Pt appears comfortable resting in bed. Bipap continues. Denies CP, SOB, nausea, vomiting, headache, dizziness, lightheadedness, fever or chills. Repeat labs sent as ordered. NSR on bedside cardiac monitor. V/S charted. Bed in lowest position, call bell in reach, wheels locked, side rails up, safety maintained. Awaiting further orders.
Pt appears comfortable sitting up in bed. Denies CP, SOB, headache, dizziness, lightheadedness, fever or chills. V/S charted. Bipap continues. Respiratory at bedside currently with pt. No apparent distress noted. Bed in lowest position, call bell in reach, wheels locked, side rails up, safety maintained. Awaiting further orders.

## 2022-09-22 NOTE — H&P ADULT - PROBLEM/PLAN-3
NOTIFICATION RETURN TO WORK / SCHOOL        3/14/2022 10:33 AM          Mr. Praveen Mcpherson  [address] [place] [id number]      To Whom It May Concern:    Praveen Mcpherson is currently under the care of Rober Juan. He will return to work/school on: 03/29/2022    If there are questions or concerns please have the patient contact our office.         Sincerely,              Curly MISTY Arguello
DISPLAY PLAN FREE TEXT

## 2022-09-22 NOTE — H&P ADULT - HISTORY OF PRESENT ILLNESS
Mr. Sharp is a 76 year old man with history of active smoking (recently cut back), COPD, lung cancer s/p QAMAR lobectomy (2019, [facility], no chemo/XRT), ?h/o DVT (off a/c since 2019, ?IVC filter placed in 2019), HTN, PVD/AAA s/p repair (2017) presenting with SOB/cough.    Patient only able to provide limited information given that he was using BiPAP during encounter, and deferred many questions to his daughter, Laurita, who is a med-surg RN. Pt and daughter report that symptoms started ~10 days ago and have predominantly included worsening SOB and cough productive of white sputum. SOB is severe, constant, and worse with exertion. Denies fever/chills, myalgias, changes in weight, URI symptoms, chest pain, hemoptysis, syncope. He describes poor PO intake over this time; he has constipation and denies any diarrhea. He was seen as an outpatient and prescribed levofloxacin x10 days, medrol dose pack, and albuterol inhaler on 9/13; however, these interventions did not seem to help and his symptoms continued to worsen so he presented to the ED fur further evaluation and management.     In the ED found to have O2 sat 74% on RA --> 94% on 3L, VBG 7.19/75, troponin 1394, EKG chronic RBBB, Na 119, Cr 2.29, CXR large R pleural effusion.  Received ceftriaxone/azithromycin, solumedrol 125, duonebs, lasix 40 IV.  Cardiology consulted for elevated troponin, felt most likely 2/2 demand  MICU consulted for respiratory failure - recommended BiPAP 10/5, continue to trend VBG, pulm consult  Pulm consulted for pleural effusion and will evaluate the patient

## 2022-09-22 NOTE — H&P ADULT - NSHPSOCIALHISTORY_GEN_ALL_CORE
Wife passed away from GBM 2 years ago. Lives alone. Daughter, Laurita, is RN and involved in his care. 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

## 2022-09-23 NOTE — CONSULT NOTE ADULT - ASSESSMENT
76M active smoker, COPD, lung cancer s/p QAMAR lobectomy 2019.   Here 9/21 with hypoxic respiratory failure, large right effusion s/p thoracentesis 9/22 2.1L, exudative.   Afebrile, cultures negative, effusion does not appear infected.   Pending CT chest.     Amrit Lewis MD   Infectious Disease   Available on TEAMS. After 5PM and on weekends please page fellow on call or call [phone] 76M active smoker, COPD, AAA repair 2017, right leg bypass 2018, lung cancer s/p QAMAR lobectomy 2019.   Here 9/21 with hypoxic respiratory failure, large right effusion.   s/p thoracentesis 9/22 2.1L, exudative.   Opacities on follow up CT but he doesn't seem infected.   Levaquin outpatient didn't help but improved after the procedure.   Nontoxic, afebrile, cultures negative.   Effusion doesn't look infected either.   Malignant? Reports being in remission but there's a 7cm hepatic mass.     Suggest  -f/u cultures   -sputum culture if able to provide   -can continue Ceftriaxone out of caution, day 3, aim for 5-7 days   -agree with stopping Levaquin - urine Legionella antigen negative   -f/u cytology   -workup of hepatic mass per medicine, I think he'll need a biopsy eventually     Discussed with medicine     Amrit Lewis MD   Infectious Disease   Available on TEAMS. After 5PM and on weekends please page fellow on call or call [phone] 76M active smoker, COPD, AAA repair 2017, right leg bypass 2018, lung cancer s/p QAMAR lobectomy 2019.   Here 9/21 with hypoxic respiratory failure, large right effusion.   s/p thoracentesis 9/22 2.1L, exudative.   Opacities on follow up CT but he doesn't seem infected.   Levaquin outpatient didn't help and improved the tap.   Nontoxic, afebrile, cultures negative and effusion doesn't looks noninfectious.   Malignant? Reports being in remission but there's a 7cm hepatic mass.     Suggest  -f/u cultures   -sputum culture if able to provide   -can continue Ceftriaxone out of caution, day 3, aim for 5-7 days   -agree with stopping Levaquin - urine Legionella antigen negative   -f/u cytology   -workup of hepatic mass per medicine, I think he'll need a biopsy eventually     Discussed with medicine     Amrit Lewis MD   Infectious Disease   Available on TEAMS. After 5PM and on weekends please page fellow on call or call [phone]

## 2022-09-23 NOTE — PROGRESS NOTE ADULT - ASSESSMENT
76 year old male with history of active smoking 3weeks ago quit, COPD not on controller, lung cancer s/p QAMAR lobectomy (2019, [facility], no chemo/XRT), h/o DVT (off a/c since 2019 with IVC filter placed in 2019), HTN, PVD/AAA s/p repair (2017) presenting with SOB/cough and found to have large right pleural effusion    #large right pleural effusion   #hypoxemia  #hypercapnia  #COPD exacerbation  #PNA    recommendations  POCUS performed: large right simple appearing pleural effusion, with safe pocket  consent obtained after discussion with Laurita  diagnostic and therapeutic 2.1 L thoracentesis performed  post thora ultrasound with residual trace pleural effusion, has lung sliding  patient able to be taken off bipap to NC post procedure  COPD: continue duoneb q6, prednisone 40 for 5 days total, please start symbicort, once duoneb made PRN after improvement then start atrovent  PNA: continue ceftriaxone/azithroymcin and send sputum culture, MRSA nasal swab, urine legionella  please obtain ECHO  please continue AVAPs overnight tonight settings: , EPAP 7, RATE 18, MIN 7, MAX 25, RISE 3, O2 30% (can titrate as necessary), can transition to NC during the day  if does not hypoexmia does not improve then will need to consider CTA (given history however has IVC filter so low suscpicion) however can consider CT chest non con in light of ALFREDO if patient continues to improve   will need to follow up with pulm as an outpatient  pulm will continue to follow    Mat Dacosta MD  [facility] PGY4  [facility] [id number], [facility] [phone]     76 year old male with history of active smoking 3weeks ago quit, COPD not on controller, lung cancer s/p QAMAR lobectomy (2019, [facility], no chemo/XRT), h/o DVT (off a/c since 2019 with IVC filter placed in 2019), HTN, PVD/AAA s/p repair (2017) presenting with SOB/cough and found to have large right pleural effusion    #large right pleural effusion   #hypoxemia  #hypercapnia  #COPD exacerbation  #PNA    recommendations  pre and post imaging reviewed  s/p diagnostic and therapeutic 2.1 L thoracentesis performed appears exudative via LDH and protein, no predominant line, only PMNs in gram stain,  f/u cyto and flow cyto,   COPD: continue duoneb q6, prednisone 40 for 5 days total, please start symbicort, once duoneb made PRN after improvement then start atrovent  PNA: continue ceftriaxone/ azithroymcin and send sputum culture, MRSA nasal swab, urine legionella  ECHO: mild diastolic dysfxn, mild TR  f/u CT chest non con  please obtain Room Air ABG   please continue AVAPs overnight tonight settings: , EPAP 7, RATE 18, MIN 7, MAX 25, RISE 3, O2 30% (can titrate as necessary), can transition to NC during the day  will need to follow up with pulm as an outpatient    Prior to discharge:  Please email: [email or web address] to setup an appointment prior to discharge. Include the patient's name, , MRN and contact information in the email.      Pulmonary/Sleep Clinic  [address] [place] [address]  [phone]    pulm will continue to follow    Mat Dacosta MD  [facility] PGY4  [facility] [id number], [facility] [phone]     76 year old male with history of active smoking 3weeks ago quit, COPD not on controller, lung cancer s/p QAMAR lobectomy (2019, [facility], no chemo/XRT), h/o DVT (off a/c since 2019 with IVC filter placed in 2019), HTN, PVD/AAA s/p repair (2017) presenting with SOB/cough and found to have large right pleural effusion    #large right pleural effusion   #hypoxemia  #hypercapnia  #COPD exacerbation  #PNA    recommendations  pre and post imaging reviewed  s/p diagnostic and therapeutic 2.1 L thoracentesis performed appears exudative via LDH and protein, no predominant line, only PMNs in gram stain,  f/u cyto and flow cyto,   COPD: continue duoneb q6, prednisone 40 for 5 days total, please start symbicort, once duoneb made PRN after improvement then start atrovent  PNA: continue ceftriaxone/ azithroymcin and send sputum culture, MRSA nasal swab, urine legionella  ECHO: mild diastolic dysfxn, mild TR  f/u CT chest non con: moderate loculated right PLEFF with partial atelectasis and consolidative opacities and hepatic mass  ABG on RA, with Aa gradient however not indicative of CO2 narcosis can hold off on further NIV overnight, and obtain room air ABG tomorrow morning to confirm tolerance without NIV  will need to follow up with pulm as an outpatient    Prior to discharge:  Please email: [email or web address] to setup an appointment prior to discharge. Include the patient's name, , MRN and contact information in the email.      Pulmonary/Sleep Clinic  [address] [place] [address]  [phone]    pulm will continue to follow    Mat Dacosta MD  [facility] PGY4  [facility] [address], [facility] [phone]

## 2022-09-23 NOTE — PROGRESS NOTE ADULT - ASSESSMENT
Mr. Sharp is a 76yoM presenting to the ED for hypercarbic resp failure found to have significant troponemia is setting of critical illness.     #Troponemia  Likely type II NSTEMI iso critical illness. I do not believe this represents ACS event. ECG without ischemic changes. CP Free.   Recommendations:  -Will likely proceed with Nuclear Stress test once medically optimized   -Please re-page cardiology when other medical conditions improved      Note incomplete until Attendings cosigns.

## 2022-09-23 NOTE — SWALLOW BEDSIDE ASSESSMENT ADULT - SWALLOW EVAL: DIAGNOSIS
1. Functional oral stage for puree, regular solids, mildly thick liquids and thin liquids marked by adequate oral acceptance, collection/chewing and transfer. Patient utilized liquid swish to clear solids from oral cavity. 2. Functional pharyngeal phase for puree, regular solids, mildly thick and thin liquid textures marked by a present pharyngeal swallow trigger with hyolaryngeal elevation upon digital palpation without evidence of airway penetration/aspiration.

## 2022-09-23 NOTE — PROGRESS NOTE ADULT - SUBJECTIVE AND OBJECTIVE BOX
Patient seen and evaluated at bedside    Reason for consult: troponemia.     Updates: Resting comfortably this am. NAEON. Denies CP/palpitations. No other concerns or complaints this am. s/p thoracentesis yesterday.       HPI:  Mr. Sharp is a 76 year old man with history of active smoking (recently cut back), COPD, lung cancer s/p QAMAR lobectomy (2019, [facility], no chemo/XRT), ?h/o DVT (off a/c since 2019, ?IVC filter placed in 2019), HTN, PVD/AAA s/p repair () presenting with SOB/cough.    Patient only able to provide limited information given that he was using BiPAP during encounter, and deferred many questions to his daughter, Laurita, who is a med-surg RN. Pt and daughter report that symptoms started ~10 days ago and have predominantly included worsening SOB and cough productive of white sputum. SOB is severe, constant, and worse with exertion. Denies fever/chills, myalgias, changes in weight, URI symptoms, chest pain, hemoptysis, syncope. He describes poor PO intake over this time; he has constipation and denies any diarrhea. He was seen as an outpatient and prescribed levofloxacin x10 days, medrol dose pack, and albuterol inhaler on ; however, these interventions did not seem to help and his symptoms continued to worsen so he presented to the ED fur further evaluation and management.     In the ED found to have O2 sat 74% on RA --> 94% on 3L, VBG 7.19/75, troponin 1394, EKG chronic RBBB, Na 119, Cr 2.29, CXR large R pleural effusion.  Received ceftriaxone/azithromycin, solumedrol 125, duonebs, lasix 40 IV.  Cardiology consulted for elevated troponin, felt most likely 2/2 demand  MICU consulted for respiratory failure - recommended BiPAP 10/5, continue to trend VBG, pulm consult  Pulm consulted for pleural effusion and will evaluate the patient (22 Sep 2022 10:20)      PMHx:   HTN (hypertension)    GERD (gastroesophageal reflux disease)    PVD (peripheral vascular disease)    AAA (abdominal aortic aneurysm)    Smoker        PSHx:   No significant past surgical history    Endoleak post (EVAR) endovascular aneurysm repair    S/P ORIF (open reduction internal fixation) fracture    S/P cataract surgery        Allergies:  No Known Allergies      Home Meds:    Current Medications:   acetaminophen     Tablet .. 650 milliGRAM(s) Oral every 6 hours PRN  albuterol/ipratropium for Nebulization 3 milliLiter(s) Nebulizer every 6 hours  aluminum hydroxide/magnesium hydroxide/simethicone Suspension 30 milliLiter(s) Oral every 4 hours PRN  azithromycin   Tablet 500 milliGRAM(s) Oral daily  benzonatate 100 milliGRAM(s) Oral every 8 hours PRN  budesonide 160 MICROgram(s)/formoterol 4.5 MICROgram(s) Inhaler 2 Puff(s) Inhalation two times a day  cefTRIAXone   IVPB 1000 milliGRAM(s) IV Intermittent every 24 hours  guaifenesin/dextromethorphan Oral Liquid 10 milliLiter(s) Oral every 4 hours PRN  heparin   Injectable 5000 Unit(s) SubCutaneous every 8 hours  melatonin 3 milliGRAM(s) Oral at bedtime PRN  metoprolol tartrate 25 milliGRAM(s) Oral two times a day  nicotine  Polacrilex Lozenge 2 milliGRAM(s) Oral every 2 hours PRN  nicotine -   7 mG/24Hr(s) Patch 1 Patch Transdermal daily  NIFEdipine XL 90 milliGRAM(s) Oral daily  pantoprazole    Tablet 40 milliGRAM(s) Oral before breakfast  polyethylene glycol 3350 17 Gram(s) Oral daily  predniSONE   Tablet 40 milliGRAM(s) Oral daily  senna 1 Tablet(s) Oral two times a day      FAMILY HISTORY:  FH: lung disease (Child)      Review of Systems:  REVIEW OF SYSTEMS:  CONSTITUTIONAL: No weakness, fevers or chills  EYES/ENT: No visual changes;  No dysphagia  NECK: No pain or stiffness  RESPIRATORY: Denies subjective SOB despite bipap.   CARDIOVASCULAR: No chest pain or palpitations  GASTROINTESTINAL: No abdominal or epigastric pain. No nausea, vomiting  BACK: No back pain  GENITOURINARY: No dysuria, frequency or hematuria  NEUROLOGICAL: No numbness or weakness  SKIN: No itching, burning, rashes, or lesions   All other review of systems is negative unless indicated above.      Physical Exam:  T(F): 97.9 (-), Max: 98.2 ()  HR: 64 () (62 - 76)  BP: 97/59 () (96/55 - 117/74)  RR: 17 ()  SpO2: 95% ()  GENERAL: No acute distress, well-developed  HEAD:  Atraumatic, Normocephalic  ENT: EOMI, PERRLA, conjunctiva and sclera clear, Neck supple, No JVD, moist mucosa  CHEST/LUNG: Clear to auscultation bilaterally; No wheeze, equal breath sounds bilaterally   BACK: No spinal tenderness  HEART: Regular rate and rhythm; No murmurs, rubs, or gallops  ABDOMEN: Soft, Nontender, Nondistended; Bowel sounds present  EXTREMITIES:  No clubbing, cyanosis, or edema  PSYCH: Nl behavior, nl affect  NEUROLOGY: AAOx3, non-focal, cranial nerves intact  SKIN: Normal color, No rashes or lesions    CXR: Personally reviewed    Labs: Personally reviewed                         )-----------( 166      ( 23 Sep 2022 05:24 )             36.1         120<LL>  |  82<L>  |  40<H>  ----------------------------<  140<H>  6.0<H>   |  24  |  1.91<H>    Ca    8.5      22 Sep 2022 12:00  Mg     2.30         TPro  7.6  /  Alb  4.1  /  TBili  0.2  /  DBili  x   /  AST  123<H>  /  ALT  23  /  AlkPhos  142<H>      PT/INR - ( 21 Sep 2022 21:17 )   PT: 13.5 sec;   INR: 1.16 ratio         PTT - ( 21 Sep 2022 21:17 )  PTT:26.5 sec  Serum Pro-Brain Natriuretic Peptide: 4162 pg/mL ( @ 21:17)    Total Cholesterol: 133  LDL: --  HDL: 34  T      Thyroid Stimulating Hormone, Serum: 1.10 uIU/mL ( @ 23:11)

## 2022-09-23 NOTE — SWALLOW BEDSIDE ASSESSMENT ADULT - COMMENTS
Pulmonology note 9/23 "76 year old male with history of active smoking 3weeks ago quit, COPD not on controller, lung cancer s/p QAMAR lobectomy (2019, [facility], no chemo/XRT), h/o DVT (off a/c since 2019 with IVC filter placed in 2019), HTN, PVD/AAA s/p repair (2017) presenting with SOB/cough and found to have large right pleural effusion #large right pleural effusion #hypoxemia #hypercapnia #COPD exacerbation #PNA"    CXR 9/23 "Moderate right pleural effusion with associated lower lung atelectasis"    Patient seen at bedside this afternoon for an initial assessment of the swallow function, at which time patient was alert. patient receiving supplemental O2 via nasal cannula. Patient is able to follow simple directives and verbalize wants/needs. Patient denies pain or difficulty eating/drinking.

## 2022-09-23 NOTE — SWALLOW BEDSIDE ASSESSMENT ADULT - SWALLOW EVAL: MANDIBULAR STRENGTH AND MOBILITY
Patient scheduled for toe nail removal on Monday 8-21, reminded patient to stop warfarin today.  Laurita Romero  
intact

## 2022-09-23 NOTE — PROGRESS NOTE ADULT - ATTENDING COMMENTS
Given degree of troponin elevation favor further risk stratification with a nuclear stress test once the patient is optimized from a respiratory standpoint.

## 2022-09-23 NOTE — PATIENT PROFILE ADULT - FALL HARM RISK - HARM RISK INTERVENTIONS
Assistance with ambulation/Assistance OOB with selected safe patient handling equipment/Communicate Risk of Fall with Harm to all staff/Discuss with provider need for PT consult/Monitor gait and stability/Reinforce activity limits and safety measures with patient and family/Tailored Fall Risk Interventions/Visual Cue: Yellow wristband and red socks/Bed in lowest position, wheels locked, appropriate side rails in place/Call bell, personal items and telephone in reach/Instruct patient to call for assistance before getting out of bed or chair/Non-slip footwear when patient is out of bed/[place] to call system/Physically safe environment - no spills, clutter or unnecessary equipment/Purposeful Proactive Rounding/Room/bathroom lighting operational, light cord in reach

## 2022-09-23 NOTE — PROGRESS NOTE ADULT - SUBJECTIVE AND OBJECTIVE BOX
CHIEF COMPLAINT:Patient is a 76y old  Male who presents with a chief complaint of Shortness of breath and cough (23 Sep 2022 11:00)      Interval Events:    REVIEW OF SYSTEMS:  [x] All other systems negative except per HPI   [ ] Unable to assess ROS because ________    OBJECTIVE:  ICU Vital Signs Last 24 Hrs  T(C): 36.6 (23 Sep 2022 06:20), Max: 36.8 (22 Sep 2022 20:28)  T(F): 97.9 (23 Sep 2022 06:20), Max: 98.2 (22 Sep 2022 20:28)  HR: 64 (23 Sep 2022 08:25) (62 - 76)  BP: 97/59 (23 Sep 2022 06:20) (96/55 - 117/74)  BP(mean): 77 (22 Sep 2022 20:28) (77 - 77)  ABP: --  ABP(mean): --  RR: 17 (23 Sep 2022 06:20) (16 - 20)  SpO2: 95% (23 Sep 2022 08:25) (94% - 97%)    O2 Parameters below as of 23 Sep 2022 06:20  Patient On (Oxygen Delivery Method): BiPAP/CPAP          Mode: NIV (Noninvasive Ventilation), RR (machine): 18, TV (machine): 500, PEEP: 7, ITime: 0.95, P-High: 30, P-Low: 10, PIP: 16     @ 07: @ 07:00  --------------------------------------------------------  IN: 175 mL / OUT: 350 mL / NET: -175 mL     @ 07:  -   @ 12:13  --------------------------------------------------------  IN: 0 mL / OUT: 200 mL / NET: -200 mL        PHYSICAL EXAM:  GENERAL: NAD, well-groomed, well-developed  HEAD:  Atraumatic, Normocephalic  EYES: EOMI, PERRLA, conjunctiva and sclera clear  ENMT: No tonsillar erythema, exudates, or enlargement; Moist mucous membranes, Good dentition, No lesions  NECK: Supple, No JVD, Normal thyroid  CHEST/LUNG: Clear to auscultation bilaterally; No rales, rhonchi, wheezing, or rubs  HEART: Regular rate and rhythm; No murmurs, rubs, or gallops  ABDOMEN: Soft, Nontender, Nondistended; Bowel sounds present  VASCULAR:  2+ Peripheral Pulses, No clubbing, cyanosis, or edema  LYMPH: No lymphadenopathy noted  SKIN: No rashes or lesions  NERVOUS SYSTEM:  Alert & Oriented X3, Good concentration; Motor Strength 5/5 B/L upper and lower extremities; DTRs 2+ intact and symmetric    HOSPITAL MEDICATIONS:  MEDICATIONS  (STANDING):  albuterol/ipratropium for Nebulization 3 milliLiter(s) Nebulizer every 6 hours  azithromycin   Tablet 500 milliGRAM(s) Oral daily  budesonide 160 MICROgram(s)/formoterol 4.5 MICROgram(s) Inhaler 2 Puff(s) Inhalation two times a day  cefTRIAXone   IVPB 1000 milliGRAM(s) IV Intermittent every 24 hours  heparin   Injectable 5000 Unit(s) SubCutaneous every 8 hours  metoprolol tartrate 25 milliGRAM(s) Oral two times a day  nicotine -   7 mG/24Hr(s) Patch 1 Patch Transdermal daily  NIFEdipine XL 90 milliGRAM(s) Oral daily  pantoprazole    Tablet 40 milliGRAM(s) Oral before breakfast  polyethylene glycol 3350 17 Gram(s) Oral daily  predniSONE   Tablet 40 milliGRAM(s) Oral daily  senna 1 Tablet(s) Oral two times a day    MEDICATIONS  (PRN):  acetaminophen     Tablet .. 650 milliGRAM(s) Oral every 6 hours PRN Temp greater or equal to 38C (100.4F), Mild Pain (1 - 3)  aluminum hydroxide/magnesium hydroxide/simethicone Suspension 30 milliLiter(s) Oral every 4 hours PRN Dyspepsia  benzonatate 100 milliGRAM(s) Oral every 8 hours PRN Cough  guaifenesin/dextromethorphan Oral Liquid 10 milliLiter(s) Oral every 4 hours PRN Cough  melatonin 3 milliGRAM(s) Oral at bedtime PRN Insomnia  nicotine  Polacrilex Lozenge 2 milliGRAM(s) Oral every 2 hours PRN Breakthrough cravings      LABS:    The Labs were reviewed by me   The Radiology was reviewed by me    EKG tracing reviewed by me        120<LL>  |  82<L>  |  40<H>  ----------------------------<  140<H>  6.0<H>   |  24  |  1.91<H>      117<LL>  |  80<L>  |  38<H>  ----------------------------<  132<H>  5.5<H>   |  24  |  2.08<H>      119<LL>  |  81<L>  |  41<H>  ----------------------------<  115<H>  5.6<H>   |  25  |  2.29<H>    Ca    8.5      22 Sep 2022 12:00  Ca    8.6      21 Sep 2022 23:11  Ca    8.7      21 Sep 2022 21:17  Mg     2.30         TPro  7.6  /  Alb  4.1  /  TBili  0.2  /  DBili  x   /  AST  123<H>  /  ALT  23  /  AlkPhos  142<H>      Magnesium, Serum: 2.30 mg/dL (22 @ 21:17)        PT/INR - ( 21 Sep 2022 21:17 )   PT: 13.5 sec;   INR: 1.16 ratio         PTT - ( 21 Sep 2022 21:17 )  PTT:26.5 sec              Urinalysis Basic - ( 22 Sep 2022 01:29 )    Color: Yellow / Appearance: Clear / S.017 / pH: x  Gluc: x / Ketone: Negative  / Bili: Negative / Urobili: <2 mg/dL   Blood: x / Protein: Trace / Nitrite: Negative   Leuk Esterase: Negative / RBC: x / WBC x   Sq Epi: x / Non Sq Epi: x / Bacteria: x                              11.5   23.46 )-----------( 166      ( 23 Sep 2022 05:24 )             36.1                         11.9   20.31 )-----------( 196      ( 21 Sep 2022 21:17 )             39.0     CAPILLARY BLOOD GLUCOSE        Blood Gas Source Venous: Venous (22 @ 12:00)      MICROBIOLOGY:     RADIOLOGY:  [ ] Reviewed and interpreted by me    Point of Care Ultrasound Findings:    PFT:    EKG: CHIEF COMPLAINT:Patient is a 76y old  Male who presents with a chief complaint of Shortness of breath and cough (23 Sep 2022 11:00)      Interval Events: patient feeling well this AM, some minimal shortness of breath and cough. Patient denies fevers, chills, chest pain, nausea, abdominal pain, diarrhea, constipation, dysuria, leg swelling, headache, light headedness    REVIEW OF SYSTEMS:  [x] All other systems negative except per HPI   [ ] Unable to assess ROS because ________    OBJECTIVE:  ICU Vital Signs Last 24 Hrs  T(C): 36.6 (23 Sep 2022 06:20), Max: 36.8 (22 Sep 2022 20:28)  T(F): 97.9 (23 Sep 2022 06:20), Max: 98.2 (22 Sep 2022 20:28)  HR: 64 (23 Sep 2022 08:25) (62 - 76)  BP: 97/59 (23 Sep 2022 06:20) (96/55 - 117/74)  BP(mean): 77 (22 Sep 2022 20:28) (77 - 77)  ABP: --  ABP(mean): --  RR: 17 (23 Sep 2022 06:20) (16 - 20)  SpO2: 95% (23 Sep 2022 08:25) (94% - 97%)    O2 Parameters below as of 23 Sep 2022 06:20  Patient On (Oxygen Delivery Method): BiPAP/CPAP          Mode: NIV (Noninvasive Ventilation), RR (machine): 18, TV (machine): 500, PEEP: 7, ITime: 0.95, P-High: 30, P-Low: 10, PIP: 16     @ 07:  -   @ 07:00  --------------------------------------------------------  IN: 175 mL / OUT: 350 mL / NET: -175 mL     @ 07:  -   @ 12:13  --------------------------------------------------------  IN: 0 mL / OUT: 200 mL / NET: -200 mL        PHYSICAL EXAM:  GENERAL: NAD, well-groomed, well-developed, large  HEAD:  Atraumatic, Normocephalic  EYES: EOMI, PERRLA, conjunctiva and sclera clear  ENMT: No tonsillar erythema, exudates, or enlargement; Moist mucous membranes, Good dentition, No lesions  NECK: Supple, No JVD, Normal thyroid  CHEST/LUNG:decreased breath sounds on right; No rales, rhonchi, wheezing, or rubs  HEART: Regular rate and rhythm; No murmurs, rubs, or gallops  ABDOMEN: Soft, Nontender, Nondistended; Bowel sounds present  VASCULAR:  2+ Peripheral Pulses, No clubbing, cyanosis, or edema. large legs with chronic stasis changes  LYMPH: No lymphadenopathy noted  SKIN: No rashes or lesions  NERVOUS SYSTEM:  Alert & Oriented X3, Good concentration; Motor Strength 5/5 B/L upper and lower extremities; DTRs 2+ intact and symmetric    HOSPITAL MEDICATIONS:  MEDICATIONS  (STANDING):  albuterol/ipratropium for Nebulization 3 milliLiter(s) Nebulizer every 6 hours  azithromycin   Tablet 500 milliGRAM(s) Oral daily  budesonide 160 MICROgram(s)/formoterol 4.5 MICROgram(s) Inhaler 2 Puff(s) Inhalation two times a day  cefTRIAXone   IVPB 1000 milliGRAM(s) IV Intermittent every 24 hours  heparin   Injectable 5000 Unit(s) SubCutaneous every 8 hours  metoprolol tartrate 25 milliGRAM(s) Oral two times a day  nicotine -   7 mG/24Hr(s) Patch 1 Patch Transdermal daily  NIFEdipine XL 90 milliGRAM(s) Oral daily  pantoprazole    Tablet 40 milliGRAM(s) Oral before breakfast  polyethylene glycol 3350 17 Gram(s) Oral daily  predniSONE   Tablet 40 milliGRAM(s) Oral daily  senna 1 Tablet(s) Oral two times a day    MEDICATIONS  (PRN):  acetaminophen     Tablet .. 650 milliGRAM(s) Oral every 6 hours PRN Temp greater or equal to 38C (100.4F), Mild Pain (1 - 3)  aluminum hydroxide/magnesium hydroxide/simethicone Suspension 30 milliLiter(s) Oral every 4 hours PRN Dyspepsia  benzonatate 100 milliGRAM(s) Oral every 8 hours PRN Cough  guaifenesin/dextromethorphan Oral Liquid 10 milliLiter(s) Oral every 4 hours PRN Cough  melatonin 3 milliGRAM(s) Oral at bedtime PRN Insomnia  nicotine  Polacrilex Lozenge 2 milliGRAM(s) Oral every 2 hours PRN Breakthrough cravings      LABS:    The Labs were reviewed by me   The Radiology was reviewed by me    EKG tracing reviewed by me        120<LL>  |  82<L>  |  40<H>  ----------------------------<  140<H>  6.0<H>   |  24  |  1.91<H>      117<LL>  |  80<L>  |  38<H>  ----------------------------<  132<H>  5.5<H>   |  24  |  2.08<H>      119<LL>  |  81<L>  |  41<H>  ----------------------------<  115<H>  5.6<H>   |  25  |  2.29<H>    Ca    8.5      22 Sep 2022 12:00  Ca    8.6      21 Sep 2022 23:11  Ca    8.7      21 Sep 2022 21:17  Mg     2.30         TPro  7.6  /  Alb  4.1  /  TBili  0.2  /  DBili  x   /  AST  123<H>  /  ALT  23  /  AlkPhos  142<H>      Magnesium, Serum: 2.30 mg/dL (22 @ 21:17)        PT/INR - ( 21 Sep 2022 21:17 )   PT: 13.5 sec;   INR: 1.16 ratio         PTT - ( 21 Sep 2022 21:17 )  PTT:26.5 sec              Urinalysis Basic - ( 22 Sep 2022 01:29 )    Color: Yellow / Appearance: Clear / S.017 / pH: x  Gluc: x / Ketone: Negative  / Bili: Negative / Urobili: <2 mg/dL   Blood: x / Protein: Trace / Nitrite: Negative   Leuk Esterase: Negative / RBC: x / WBC x   Sq Epi: x / Non Sq Epi: x / Bacteria: x                              11.5   23.46 )-----------( 166      ( 23 Sep 2022 05:24 )             36.1                         11.9   20.31 )-----------( 196      ( 21 Sep 2022 21:17 )             39.0     CAPILLARY BLOOD GLUCOSE        Blood Gas Source Venous: Venous (22 @ 12:00)      MICROBIOLOGY:     RADIOLOGY:  [ ] Reviewed and interpreted by me    Point of Care Ultrasound Findings:    PFT:    EKG:

## 2022-09-23 NOTE — PROGRESS NOTE ADULT - SUBJECTIVE AND OBJECTIVE BOX
SUBJECTIVE / OVERNIGHT EVENTS:pt seen and examined  22     MEDICATIONS  (STANDING):  albuterol/ipratropium for Nebulization 3 milliLiter(s) Nebulizer every 6 hours  budesonide 160 MICROgram(s)/formoterol 4.5 MICROgram(s) Inhaler 2 Puff(s) Inhalation two times a day  cefTRIAXone   IVPB 1000 milliGRAM(s) IV Intermittent every 24 hours  heparin   Injectable 5000 Unit(s) SubCutaneous every 8 hours  influenza  Vaccine (HIGH DOSE) 0.7 milliLiter(s) IntraMuscular once  metoprolol tartrate 25 milliGRAM(s) Oral two times a day  nicotine -   7 mG/24Hr(s) Patch 1 Patch Transdermal daily  NIFEdipine XL 90 milliGRAM(s) Oral daily  pantoprazole    Tablet 40 milliGRAM(s) Oral before breakfast  polyethylene glycol 3350 17 Gram(s) Oral daily  predniSONE   Tablet 40 milliGRAM(s) Oral daily  senna 1 Tablet(s) Oral two times a day    MEDICATIONS  (PRN):  acetaminophen     Tablet .. 650 milliGRAM(s) Oral every 6 hours PRN Temp greater or equal to 38C (100.4F), Mild Pain (1 - 3)  aluminum hydroxide/magnesium hydroxide/simethicone Suspension 30 milliLiter(s) Oral every 4 hours PRN Dyspepsia  benzonatate 100 milliGRAM(s) Oral every 8 hours PRN Cough  guaifenesin/dextromethorphan Oral Liquid 10 milliLiter(s) Oral every 4 hours PRN Cough  melatonin 3 milliGRAM(s) Oral at bedtime PRN Insomnia  nicotine  Polacrilex Lozenge 2 milliGRAM(s) Oral every 2 hours PRN Breakthrough cravings    T(C): 36.3 (22 @ 21:07), Max: 36.9 (22 @ 17:12)  HR: 64 (22 @ 21:07) (56 - 91)  BP: 97/61 (22 @ 21:07) (96/55 - 112/68)  RR: 18 (22 @ 21:07) (17 - 20)  SpO2: 87% (22 @ 21:07) (87% - 97%)    CAPILLARY BLOOD GLUCOSE        I&O's Summary    22 Sep 2022 07:01  -  23 Sep 2022 07:00  --------------------------------------------------------  IN: 175 mL / OUT: 350 mL / NET: -175 mL    23 Sep 2022 07:01  -  23 Sep 2022 22:56  --------------------------------------------------------  IN: 1200 mL / OUT: 1100 mL / NET: 100 mL        Constitutional: No fever, fatigue  Skin: No rash.  Eyes: No recent vision problems or eye pain.  ENT: No congestion, ear pain, or sore throat.  Cardiovascular: No chest pain or palpation.  Respiratory: No cough, shortness of breath, congestion, or wheezing.  Gastrointestinal: No abdominal pain, nausea, vomiting, or diarrhea.  Genitourinary: No dysuria.  Musculoskeletal: No joint swelling.  Neurologic: No headache.    PHYSICAL EXAM:  GENERAL: NAD  EYES: EOMI, PERRLA  NECK: Supple, No JVD  CHEST/LUNG: dec breath sounds at bases  HEART:  S1 , S2 +  ABDOMEN: soft , bs+  EXTREMITIES:  edema+  NEUROLOGY:alert awake       LABS:                        11.5   23.46 )-----------( 166      ( 23 Sep 2022 05:24 )             36.1     09-22    120<LL>  |  82<L>  |  40<H>  ----------------------------<  140<H>  6.0<H>   |  24  |  1.91<H>    Ca    8.5      22 Sep 2022 12:00        CARDIAC MARKERS ( 21 Sep 2022 23:11 )  x     / x     / 1180 U/L / x     / 191.1 ng/mL      Urinalysis Basic - ( 22 Sep 2022 01:29 )    Color: Yellow / Appearance: Clear / S.017 / pH: x  Gluc: x / Ketone: Negative  / Bili: Negative / Urobili: <2 mg/dL   Blood: x / Protein: Trace / Nitrite: Negative   Leuk Esterase: Negative / RBC: x / WBC x   Sq Epi: x / Non Sq Epi: x / Bacteria: x        RADIOLOGY & ADDITIONAL TESTS:    Imaging Personally Reviewed:    Consultant(s) Notes Reviewed:      Care Discussed with Consultants/Other Providers:

## 2022-09-23 NOTE — PATIENT PROFILE ADULT - FALL HARM RISK - CONCLUSION
So far my record says she tried/ failed: Toradol p o  Toradol injection  Sumatriptan  Decadron injection    Allergy to Depakote  She is already on Abilify, olanzapine contraindicated      I will send Delfina Rodriguez to see if this is covered by her insurance  Hold sumatriptan  In case Delfina Rodriguez is not covered or not available, she can try indomethacin  Hold Toradol  In case indocin does not help- can trial alternative triptan like zomig or even DHE  Fall with Harm Risk

## 2022-09-23 NOTE — PROGRESS NOTE ADULT - ATTENDING COMMENTS
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    Now with significantly improved respiratory status after right thoracentesis on 9/22 with drainage of >2100 mL of serous pleural fluid. Fluid is exudative, monocyte predominant, negative cultures. Follow-up cytopath and flow cytometry.     CT Chest today with moderate loculated right pleural effusion with partial atelectasis of the RLL/RML and associated patchy and consolidative opacities in the R lung. S/p partial left lung resection. Centrilobular emphysema present. Tracheobronchial secretions. There is a 7 cm hepatic mass.    Suspect malignant etiology, unclear primary. He has a history of lung cancer s/p QAMAR lobectomy without adequate post-op follow-up. Now with hepatic mass. Follow-up pleural fluid cytopathology.    ABG today with resolved acute hypercapnic respiratory failure. There is mild hypoxemia. Continue supplemental oxygen with NC@2-4 LPM, goal SPO2>90%. Continue AVAPS per patient's preference, but he does not require given that hypercapnia is resolved.     Continue ceftriaxone for likely pneumonia. Check sputum culture. Legionella is negative, agree with holding azithromycin. Continue prednisone 40 mg daily to complete 5 days for COPD exacerbation. Continue albuterol/ipratropium nebs and Symbicort 160-4.5 mcg 2 puffs twice daily, rinse after use.    Discussed with patient and daughter Laurita at bedside.

## 2022-09-23 NOTE — SWALLOW BEDSIDE ASSESSMENT ADULT - POSITIONING
2 person skin assessment performed by LAMONTE Palencia and LAMONTE Avendaño, rash noted to sacrum upright (90 degrees)

## 2022-09-23 NOTE — CONSULT NOTE ADULT - ASSESSMENT
Interventional Radiology    Evaluate for Procedure:     HPI: 76 year old male with history of active smoking 3weeks ago quit, COPD not on controller, lung cancer s/p QAMAR lobectomy (2019, [facility], no chemo/XRT), h/o DVT (off a/c since 2019 with IVC filter placed in 2019), HTN, PVD/AAA s/p repair (2017) presenting with SOB/cough and found to have large right pleural effusion. During work up for shortness of breath with CT chest, patient was found to have a 7 cm hepatic lesion which could represent a primary lesion or metastatic lesion given prior history of lung cancer (although supposedly in remission). IR has been consulted for liver biopsy.     Allergies:   Medications (Abx/Cardiac/Anticoagulation/Blood Products)    azithromycin   Tablet: 500 milliGRAM(s) Oral (09-23 @ 12:57)  azithromycin  IVPB: 255 mL/Hr IV Intermittent (09-21 @ 22:54)  cefTRIAXone   IVPB: 100 mL/Hr IV Intermittent (09-21 @ 22:17)  cefTRIAXone   IVPB: 100 mL/Hr IV Intermittent (09-22 @ 23:34)  furosemide   Injectable: 40 milliGRAM(s) IV Push (09-22 @ 01:22)  heparin   Injectable: 5000 Unit(s) SubCutaneous (09-23 @ 15:11)  metoprolol tartrate: 25 milliGRAM(s) Oral (09-23 @ 17:28)  NIFEdipine XL: 90 milliGRAM(s) Oral (09-22 @ 11:37)    Data:    T(C): 36.9  HR: 91  BP: 105/62  RR: 17  SpO2: 90%    -WBC 23.46 / HgB 11.5 / Hct 36.1 / Plt 166  -Na 120 / Cl 82 / BUN 40 / Glucose 140  -K 6.0 / CO2 24 / Cr 1.91  -ALT -- / Alk Phos -- / T.Bili --  -INR 1.16 / PTT 26.5      Radiology:     Assessment/Plan: 76 year old male with history of active smoking 3weeks ago quit, COPD not on controller, lung cancer s/p QAMAR lobectomy (2019, [facility], no chemo/XRT), h/o DVT (off a/c since 2019 with IVC filter placed in 2019), HTN, PVD/AAA s/p repair (2017) presenting with SOB/cough and found to have large right pleural effusion. During work up for shortness of breath with CT chest, patient was found to have a 7 cm hepatic lesion which could represent a primary lesion or metastatic lesion given prior history of lung cancer (although supposedly in remission). IR has been consulted for liver biopsy.     77 y/o M w/ PMHx lung cancer s/p QAMAR lobectomy, prior DVT, HTN, PVD / AAA repair presenting with shortness of breath / cough and found to have large right pleural effusion. IR consulted for liver biopsy.     -- Liver lesion is noted on CT chest. Would recommend CT abdomen / pelvis to evaluate the rest of the abdomen and pelvis to complete a basic malignancy work-up of imaging. MRI of the liver can also be considered for further characterization.   -- Patient is also here for shortness of breath with related findings on CT chest. Would wait till acute issues are resolved prior to biopsy is considered. In this case, patient can be scheduled for biopsy in the outpatient setting after acute issues are resolved (Refer to IR outpatient booking numbers below).   -- Can reconsult IR once additional imaging is completed or schedule patient for outpatient biopsy as described above.   -- Discussed w/ Dr. Linda    --  Wilbur Lane MD   Diagnostic Radiology Resident (PGY-3)  IR Pager: [phone]   Available by chat via Microsoft Teams     For questions about scheduling during appropriate work hours, call IR :  [phone]    For outpatient IR Booking:   [facility]: [phone]  [facility]: [phone] Interventional Radiology    Evaluate for Procedure:     HPI: 76 year old male with history of active smoking 3weeks ago quit, COPD not on controller, lung cancer s/p QAMAR lobectomy (2019, [facility], no chemo/XRT), h/o DVT (off a/c since 2019 with IVC filter placed in 2019), HTN, PVD/AAA s/p repair (2017) presenting with SOB/cough and found to have large right pleural effusion. During work up for shortness of breath with CT chest, patient was found to have a 7 cm hepatic lesion which could represent a primary lesion or metastatic lesion given prior history of lung cancer (although supposedly in remission). IR has been consulted for liver biopsy.     Allergies:   Medications (Abx/Cardiac/Anticoagulation/Blood Products)    azithromycin   Tablet: 500 milliGRAM(s) Oral (09-23 @ 12:57)  azithromycin  IVPB: 255 mL/Hr IV Intermittent (09-21 @ 22:54)  cefTRIAXone   IVPB: 100 mL/Hr IV Intermittent (09-21 @ 22:17)  cefTRIAXone   IVPB: 100 mL/Hr IV Intermittent (09-22 @ 23:34)  furosemide   Injectable: 40 milliGRAM(s) IV Push (09-22 @ 01:22)  heparin   Injectable: 5000 Unit(s) SubCutaneous (09-23 @ 15:11)  metoprolol tartrate: 25 milliGRAM(s) Oral (09-23 @ 17:28)  NIFEdipine XL: 90 milliGRAM(s) Oral (09-22 @ 11:37)    Data:    T(C): 36.9  HR: 91  BP: 105/62  RR: 17  SpO2: 90%    -WBC 23.46 / HgB 11.5 / Hct 36.1 / Plt 166  -Na 120 / Cl 82 / BUN 40 / Glucose 140  -K 6.0 / CO2 24 / Cr 1.91  -ALT -- / Alk Phos -- / T.Bili --  -INR 1.16 / PTT 26.5      Radiology:     Assessment/Plan: 76 year old male with history of active smoking 3weeks ago quit, COPD not on controller, lung cancer s/p QAMAR lobectomy (2019, [facility], no chemo/XRT), h/o DVT (off a/c since 2019 with IVC filter placed in 2019), HTN, PVD/AAA s/p repair (2017) presenting with SOB/cough and found to have large right pleural effusion. During work up for shortness of breath with CT chest, patient was found to have a 7 cm hepatic lesion which could represent a primary lesion or metastatic lesion given prior history of lung cancer (although supposedly in remission). IR has been consulted for liver biopsy.     77 y/o M w/ PMHx lung cancer s/p QAMAR lobectomy, prior DVT, HTN, PVD / AAA repair presenting with shortness of breath / cough and found to have large right pleural effusion. IR consulted for liver biopsy.     -- Liver lesion is noted on CT chest. Would recommend CT abdomen / pelvis to evaluate the rest of the abdomen and pelvis to complete a basic malignancy work-up of imaging. MRI of the liver can also be considered for further characterization.   -- Patient is also here for shortness of breath with related findings on CT chest. Would wait till acute issues are resolved prior to biopsy is considered. In this case, patient can be scheduled for biopsy in the outpatient setting after acute issues are resolved (Refer to IR outpatient booking numbers below).   -- Can reconsult / let IR know once additional imaging is completed or schedule patient for outpatient biopsy as described above.   -- Discussed w/ Dr. Linda    --  Wilbur Lane MD   Diagnostic Radiology Resident (PGY-3)  IR Pager: [phone]   Available by chat via Microsoft Teams     For questions about scheduling during appropriate work hours, call IR :  [phone]    For outpatient IR Booking:   [facility]: [phone]  [facility]: [phone]

## 2022-09-23 NOTE — CONSULT NOTE ADULT - SUBJECTIVE AND OBJECTIVE BOX
HPI:  76M active smoker with PAD s/p AAA repair , R fem-tib bypass 2018, COPD and lung cancer s/p QAMAR lobectomy .   Here  for 10 days of dyspnea and productive cough.   SOB is severe, constant, and worse with exertion.   Denies fever/chills, myalgias, changes in weight, URI symptoms, chest pain, hemoptysis, syncope.   He describes poor PO intake over this time; he has constipation and denies any diarrhea.   He was seen as an outpatient and prescribed levofloxacin x10 days, medrol dose pack, and albuterol inhaler on ; however, these interventions did not seem to help and his symptoms continued to worsen so he presented to the ED fur further evaluation and management.   Hypoxic respiratory failure with large R effusion s/p thoracentesis  of 2.1L, exudative.       PAST MEDICAL & SURGICAL HISTORY:  HTN (hypertension)      GERD (gastroesophageal reflux disease)      PVD (peripheral vascular disease)      AAA (abdominal aortic aneurysm)  s/p repair in 2017      Smoker      Endoleak post (EVAR) endovascular aneurysm repair  2017      S/P ORIF (open reduction internal fixation) fracture  Right ankle, distant past      S/P cataract surgery  bilateral          Allergies    No Known Allergies    Intolerances        ANTIMICROBIALS:  azithromycin   Tablet 500 daily  cefTRIAXone   IVPB 1000 every 24 hours      OTHER MEDS:  acetaminophen     Tablet .. 650 milliGRAM(s) Oral every 6 hours PRN  albuterol/ipratropium for Nebulization 3 milliLiter(s) Nebulizer every 6 hours  aluminum hydroxide/magnesium hydroxide/simethicone Suspension 30 milliLiter(s) Oral every 4 hours PRN  benzonatate 100 milliGRAM(s) Oral every 8 hours PRN  budesonide 160 MICROgram(s)/formoterol 4.5 MICROgram(s) Inhaler 2 Puff(s) Inhalation two times a day  guaifenesin/dextromethorphan Oral Liquid 10 milliLiter(s) Oral every 4 hours PRN  heparin   Injectable 5000 Unit(s) SubCutaneous every 8 hours  melatonin 3 milliGRAM(s) Oral at bedtime PRN  metoprolol tartrate 25 milliGRAM(s) Oral two times a day  nicotine  Polacrilex Lozenge 2 milliGRAM(s) Oral every 2 hours PRN  nicotine -   7 mG/24Hr(s) Patch 1 Patch Transdermal daily  NIFEdipine XL 90 milliGRAM(s) Oral daily  pantoprazole    Tablet 40 milliGRAM(s) Oral before breakfast  polyethylene glycol 3350 17 Gram(s) Oral daily  predniSONE   Tablet 40 milliGRAM(s) Oral daily  senna 1 Tablet(s) Oral two times a day      SOCIAL HISTORY:    FAMILY HISTORY:  FH: lung disease (Child)        ROS:  Unobtainable because:   All other systems negative   Constitutional: no fever, no chills  Eye: no vision changes  ENT: no sore throat, no rhinorrhea  Cardiovascular: no chest pain, no palpitation  Respiratory: no SOB, no cough  GI:  no abd pain, no vomiting, no diarrhea  urinary: no dysuria, no hematuria, no flank pain  musculoskeletal: no joint pain, no joint swelling  skin: no rash  neurology: no headache, no change in mental status  psych: no anxiety    Physical Exam:  General: awake, alert, non toxic  Head: atraumatic, normocephalic  Eyes: normal sclera and conjunctiva  ENT: no oropharyngeal lesions, no LAD, neck supple  Cardio: regular rate and rhythm   Respiratory: nonlabored on room air, clear bilaterally, no wheezing  abd: soft, bowel sounds present, not tender  : no suprapubic tenderness, no maxwell  Musculoskeletal: no joint swelling, no edema  Skin: no rash  vascular: no phlebitis  Neurologic: no focal deficits  psych: normal affect       Drug Dosing Weight  Height (cm): 168.9 (21 Sep 2022 18:57)    Vital Signs Last 24 Hrs  T(F): 97.9 (22 @ 06:20), Max: 98.2 (22 @ 20:28)    Vital Signs Last 24 Hrs  HR: 64 (22 @ 08:25) (62 - 76)  BP: 97/59 (22 @ 06:20) (96/55 - 117/74)  RR: 17 (22 @ 06:20)  SpO2: 95% (22 @ 08:25) (94% - 97%)  Wt(kg): --                          11.5   23.46 )-----------( 166      ( 23 Sep 2022 05:24 )             36.1           120<LL>  |  82<L>  |  40<H>  ----------------------------<  140<H>  6.0<H>   |  24  |  1.91<H>    Ca    8.5      22 Sep 2022 12:00  Mg     2.30         TPro  7.6  /  Alb  4.1  /  TBili  0.2  /  DBili  x   /  AST  123<H>  /  ALT  23  /  AlkPhos  142<H>        Urinalysis Basic - ( 22 Sep 2022 01:29 )    Color: Yellow / Appearance: Clear / S.017 / pH: x  Gluc: x / Ketone: Negative  / Bili: Negative / Urobili: <2 mg/dL   Blood: x / Protein: Trace / Nitrite: Negative   Leuk Esterase: Negative / RBC: x / WBC x   Sq Epi: x / Non Sq Epi: x / Bacteria: x        MICROBIOLOGY:  Culture - Body Fluid with Gram Stain (collected 22 @ 17:00)  Source: Pleural Fl Pleural Fluid  Gram Stain (22 @ 05:08):    polymorphonuclear leukocytes seen    No organisms seen    by cytocentrifuge    Culture - Urine (collected 22 @ 04:33)  Source: Clean Catch Clean Catch (Midstream)  Final Report (22 @ 08:30):    <10,000 CFU/mL Normal Urogenital Marybel    Culture - Blood (collected 22 @ 21:32)  Source: .Blood Blood-Peripheral  Preliminary Report (22 @ 02:04):    No growth to date.    Culture - Blood (collected 22 @ 21:17)  Source: .Blood Blood-Peripheral  Preliminary Report (22 @ 02:03):    No growth to date.    RADIOLOGY:  Images below reviewed personally  Xray Chest 1 View- PORTABLE-Urgent (22 @ 22:08)   Complete opacification of the right hemithorax with mediastinal   displacement to the left side consistent with a large right effusion.   Left lung is clear.  No left pleural effusion or pneumothorax.   HPI:  76M active smoker with PAD s/p AAA repair , R fem-tib bypass 2018, COPD and lung cancer s/p QAMAR lobectomy .   Here  for about 10 days of dyspnea and productive cough.   No fevers or chills, myalgias.   No rhinorrhea or sore throat.   Worse with exertion.   Prescribed Levofloxacin for 10 days plus medrol dose pack and albuterol inhaler on  but did not help.   Found to be in hypoxic respiratory failure with large R effusion.   s/p thoracentesis  of 2.1L, exudative.   Feels a lot better. Just tired. Still some cough.   Cancer is gone as far as he knows. Reports negative surveillance exams.       PAST MEDICAL & SURGICAL HISTORY:  HTN (hypertension)      GERD (gastroesophageal reflux disease)      PVD (peripheral vascular disease)      AAA (abdominal aortic aneurysm)  s/p repair in 2017      Smoker      Endoleak post (EVAR) endovascular aneurysm repair  2017      S/P ORIF (open reduction internal fixation) fracture  Right ankle, distant past      S/P cataract surgery  bilateral          Allergies    No Known Allergies    Intolerances        ANTIMICROBIALS:  azithromycin   Tablet 500 daily  cefTRIAXone   IVPB 1000 every 24 hours      OTHER MEDS:  acetaminophen     Tablet .. 650 milliGRAM(s) Oral every 6 hours PRN  albuterol/ipratropium for Nebulization 3 milliLiter(s) Nebulizer every 6 hours  aluminum hydroxide/magnesium hydroxide/simethicone Suspension 30 milliLiter(s) Oral every 4 hours PRN  benzonatate 100 milliGRAM(s) Oral every 8 hours PRN  budesonide 160 MICROgram(s)/formoterol 4.5 MICROgram(s) Inhaler 2 Puff(s) Inhalation two times a day  guaifenesin/dextromethorphan Oral Liquid 10 milliLiter(s) Oral every 4 hours PRN  heparin   Injectable 5000 Unit(s) SubCutaneous every 8 hours  melatonin 3 milliGRAM(s) Oral at bedtime PRN  metoprolol tartrate 25 milliGRAM(s) Oral two times a day  nicotine  Polacrilex Lozenge 2 milliGRAM(s) Oral every 2 hours PRN  nicotine -   7 mG/24Hr(s) Patch 1 Patch Transdermal daily  NIFEdipine XL 90 milliGRAM(s) Oral daily  pantoprazole    Tablet 40 milliGRAM(s) Oral before breakfast  polyethylene glycol 3350 17 Gram(s) Oral daily  predniSONE   Tablet 40 milliGRAM(s) Oral daily  senna 1 Tablet(s) Oral two times a day      SOCIAL HISTORY: smokes     FAMILY HISTORY:  FH: lung disease (Child)    ROS:  All other systems negative   Constitutional: no fever, no chills  Eye: no vision changes  ENT: no sore throat, no rhinorrhea  Cardiovascular: no chest pain   Respiratory: per HPI   GI:  no abd pain, no diarrhea  urinary: no dysuria  musculoskeletal: no joint pain   skin: no rash  neurology: no headache   psych: no anxiety    Physical Exam:  General: awake, alert, non toxic  Head: atraumatic, normocephalic  Eyes: normal sclera and conjunctiva  ENT: no LAD, neck supple  Cardio: regular rate and rhythm   Respiratory: nonlabored on room air, scattered rhonchi   abd: soft, bowel sounds present, not tender  : no suprapubic tenderness   Musculoskeletal: no joint swelling, no edema  Skin: no rash  vascular: no phlebitis  Neurologic: no focal deficits  psych: normal affect       Drug Dosing Weight  Height (cm): 168.9 (21 Sep 2022 18:57)    Vital Signs Last 24 Hrs  T(F): 97.9 (22 @ 06:20), Max: 98.2 (22 @ 20:28)    Vital Signs Last 24 Hrs  HR: 64 (22 @ 08:25) (62 - 76)  BP: 97/59 (22 @ 06:20) (96/55 - 117/74)  RR: 17 (22 @ 06:20)  SpO2: 95% (22 @ 08:25) (94% - 97%)  Wt(kg): --                          11.5   23.46 )-----------( 166      ( 23 Sep 2022 05:24 )             36.1           120<LL>  |  82<L>  |  40<H>  ----------------------------<  140<H>  6.0<H>   |  24  |  1.91<H>    Ca    8.5      22 Sep 2022 12:00  Mg     2.30         TPro  7.6  /  Alb  4.1  /  TBili  0.2  /  DBili  x   /  AST  123<H>  /  ALT  23  /  AlkPhos  142<H>        Urinalysis Basic - ( 22 Sep 2022 01:29 )    Color: Yellow / Appearance: Clear / S.017 / pH: x  Gluc: x / Ketone: Negative  / Bili: Negative / Urobili: <2 mg/dL   Blood: x / Protein: Trace / Nitrite: Negative   Leuk Esterase: Negative / RBC: x / WBC x   Sq Epi: x / Non Sq Epi: x / Bacteria: x        MICROBIOLOGY:  Culture - Body Fluid with Gram Stain (collected 22 @ 17:00)  Source: Pleural Fl Pleural Fluid  Gram Stain (22 @ 05:08):    polymorphonuclear leukocytes seen    No organisms seen    by cytocentrifuge    Culture - Urine (collected 22 @ 04:33)  Source: Clean Catch Clean Catch (Midstream)  Final Report (22 @ 08:30):    <10,000 CFU/mL Normal Urogenital Marybel    Culture - Blood (collected 22 @ 21:32)  Source: .Blood Blood-Peripheral  Preliminary Report (22 @ 02:04):    No growth to date.    Culture - Blood (collected 22 @ 21:17)  Source: .Blood Blood-Peripheral  Preliminary Report (22 @ 02:03):    No growth to date.    RADIOLOGY:  Images below reviewed personally  CT Chest No Cont (22 @ 11:57)   7 cm hepatic mass.   Partial left lung resection. Moderate loculated right pleural effusion   partial atelectasis right lower and middle lobes and associated patchy   and consolidative opacities right lung

## 2022-09-24 NOTE — CONSULT NOTE ADULT - SUBJECTIVE AND OBJECTIVE BOX
[facility] NEPHROLOGY- CONSULTATION NOTE    Patient is a 76y Male whom presented to the hospital with SOB. Nephrology consulted for Hyponatremia.     PAST MEDICAL & SURGICAL HISTORY:  HTN (hypertension)      GERD (gastroesophageal reflux disease)      PVD (peripheral vascular disease)      AAA (abdominal aortic aneurysm)  s/p repair in 2017      Smoker      Endoleak post (EVAR) endovascular aneurysm repair  2017      S/P ORIF (open reduction internal fixation) fracture  Right ankle, distant past      S/P cataract surgery  bilateral        No Known Allergies    Home Medications Reviewed  Hospital Medications:   MEDICATIONS  (STANDING):  albuterol/ipratropium for Nebulization 3 milliLiter(s) Nebulizer every 6 hours  budesonide 160 MICROgram(s)/formoterol 4.5 MICROgram(s) Inhaler 2 Puff(s) Inhalation two times a day  cefTRIAXone   IVPB 1000 milliGRAM(s) IV Intermittent every 24 hours  dextrose 50% Injectable 50 milliLiter(s) IV Push once  heparin   Injectable 5000 Unit(s) SubCutaneous every 8 hours  influenza  Vaccine (HIGH DOSE) 0.7 milliLiter(s) IntraMuscular once  insulin regular  human recombinant 5 Unit(s) IV Push once  metoprolol tartrate 25 milliGRAM(s) Oral two times a day  nicotine -   7 mG/24Hr(s) Patch 1 Patch Transdermal daily  NIFEdipine XL 90 milliGRAM(s) Oral daily  pantoprazole    Tablet 40 milliGRAM(s) Oral before breakfast  polyethylene glycol 3350 17 Gram(s) Oral daily  predniSONE   Tablet 40 milliGRAM(s) Oral daily  senna 1 Tablet(s) Oral two times a day    SOCIAL HISTORY:  Quit smoking tobacco cig one month ago  Denies ETOh  FAMILY HISTORY:  FH: lung disease (Child)      REVIEW OF SYSTEMS:  CONSTITUTIONAL: + generalized weakness. No fevers or chills  EYES/ENT: No visual changes;  No vertigo or throat pain   NECK: No pain or stiffness  RESPIRATORY: + cough- productive. No  wheezing, hemoptysis; No shortness of breath. Quit smoking one month ago.   CARDIOVASCULAR: No chest pain or palpitations.  GASTROINTESTINAL: No abdominal or epigastric pain. No nausea, vomiting, or hematemesis; No diarrhea or constipation. No melena or hematochezia.  GENITOURINARY: No dysuria, frequency, foamy urine, urinary urgency, incontinence or hematuria  NEUROLOGICAL: No numbness or weakness  SKIN: No itching, burning, rashes, or lesions   VASCULAR: No bilateral lower extremity edema.   All other review of systems is negative unless indicated above.    VITALS:  T(F): 97.4 (22 @ 06:20), Max: 98.4 (22 @ 17:12)  HR: 63 (22 @ 06:20)  BP: 90/56 (22 @ 06:20)  RR: 18 (22 @ 06:20)  SpO2: 99% (22 @ 06:20)  Wt(kg): --     @ 07:01  -   @ 07:00  --------------------------------------------------------  IN: 1445 mL / OUT: 1900 mL / NET: -455 mL     @ 07:01  -   @ 14:28  --------------------------------------------------------  IN: 0 mL / OUT: 600 mL / NET: -600 mL        PHYSICAL EXAM:  Constitutional: NAD  HEENT: NC AT,anicteric sclera, oropharynx clear, MMM  Neck: No JVD  Respiratory: + B/L rhonchi. CTAB, no wheezes, rales.   Cardiovascular: S1, S2, RRR  Gastrointestinal: BS+, soft, NT/ND  Extremities: No cyanosis or clubbing. No peripheral edema  Neurological: A/O x 4, no focal deficits  Psychiatric: Normal mood, normal affect  : No CVA tenderness. No maxwell.   Skin: warm and dry       LABS:      116<LL>  |  82<L>  |  52<H>  ----------------------------<  165<H>  6.9<HH>   |  17<L>  |  1.72<H>  MODERATELY HEMOLYZED  Ca    8.3<L>      24 Sep 2022 12:00  Phos  4.9       Mg     2.70           Creatinine Trend: 1.72 <--, 1.74 <--, 1.86 <--, 1.91 <--, 2.08 <--, 2.29 <--                        10.9   20.16 )-----------( 157      ( 24 Sep 2022 06:43 )             34.4     Urine Studies:  Urinalysis Basic - ( 22 Sep 2022 01:29 )    Color: Yellow / Appearance: Clear / S.017 / pH:   Gluc:  / Ketone: Negative  / Bili: Negative / Urobili: <2 mg/dL   Blood:  / Protein: Trace / Nitrite: Negative   Leuk Esterase: Negative / RBC:  / WBC    Sq Epi:  / Non Sq Epi:  / Bacteria:       Osmolality, Random Urine: 220 mosm/kg ( @ 12:00)  Sodium, Random Urine: <20 mmol/L ( @ 12:00)    Culture - Sputum . (22 @ 18:21)   Gram Stain:   Moderate polymorphonuclear leukocytes per low power field   Rare Squamous epithelial cells per low power field   Few Gram positive cocci in pairs seen per oil power field   Rare Gram Negative Coccobacilli seen per oil power field   Specimen Source: .Sputum Sputum Blood Gas Profile - Arterial (22 @ 14:53)   pH, Arterial: 7.41: No collection time indicated, please interpret with caution   pCO2, Arterial: 40 mmHg   pO2, Arterial: 60 mmHg   HCO3, Arterial: 25 mmol/L   Base Excess, Arterial: 0.7 mmol/L   Oxygen Saturation, Arterial: 92.9 %   Total CO2, Arterial: 27 mmol/L actate Dehydrogenase, Serum (22 @ 05:24)   Lactate Dehydrogenase, Serum: 525 U/L A1C with Estimated Average Glucose (22 @ 05:24)   A1C with Estimated Average Glucose Result: 6.3: High Risk (prediabetic) 5.7 - 6.4 % Lipid Profile (22 @ 05:24)   Cholesterol, Serum: 133 mg/dL   Triglycerides, Serum: 89 mg/dL   HDL Cholesterol, Serum: 34 mg/dL   Non HDL Cholesterol: 99: Patients Atherosclerotic Cardiovascular Disease (ASCVD) Risk   Optimal Level (mg/dL)   LDL Cholesterol (LDL-C) Culture - Acid Fast - Body Fluid w/Smear (22 @ 17:00)   Specimen Source: .Body Fluid PLEURAL FLUID   Acid Fast Bacilli Smear:   No acid fast bacilli seen by fluorochrome stain Culture - Body Fluid with Gram Stain (22 @ 17:00)   Gram Stain:   polymorphonuclear leukocytes seen   No organisms seen   by cytocentrifuge   Specimen Source: Pleural Fl Pleural Fluid   Culture Results:   No growth rocalcitonin, Serum (22 @ 12:15)   Procalcitonin, Serum: 0.17: Procalcitonin (PCT) Interpretation (ng/mL) - Diagnosis of systemic   bacterial infection/sepsis:     RADIOLOGY & ADDITIONAL STUDIES:        < from: CT Chest No Cont (22 @ 11:57) >  ACC: [id number] EXAM:  CT CHEST                          PROCEDURE DATE:  2022          INTERPRETATION:  EXAMINATION: CT CHEST    CLINICAL INDICATION: Dyspnea.    TECHNIQUE: Noncontrast CT of the chest was obtained.    COMPARISON: 2017.    FINDINGS:    AIRWAYS AND LUNGS: Tracheobronchial secretions.  Emphysema is present.   Partial left lung resection. Moderate loculated right pleural effusion   partial atelectasis right lower and middle lobes and associated patchy   and consolidative opacities right lung.    MEDIASTINUM AND PLEURA: Mediastinal adenopathy. The visualized portion of   the thyroid gland is unremarkable. There is no pneumothorax.    HEART AND VESSELS: There is cardiomegaly.  There are atherosclerotic   calcifications of the aorta and coronary arteries.  There is no   pericardial effusion.  Aortic valve calcification    UPPER ABDOMEN: Images of the upper abdomen demonstrate 7 cm hepatic mass   . Ascites.    BONES AND SOFT TISSUES: The bones are unremarkable.  The soft tissues are   unremarkable.    IMPRESSION:  7 cm hepatic mass    Partial left lung resection. Moderate loculated right pleural effusion   partial atelectasis right lower and middle lobes and associated patchy   and consolidative opacities right lung    --- End of Report ---            ANDREA NIELSON MD; Attending Radiologist  This document has been electronically signed. Sep 23 2022  2:02PM    < end of copied text >  < from: Xray Chest 1 View- PORTABLE-Urgent (Xray Chest 1 View- PORTABLE-Urgent .) (22 @ 20:17) >  ACC: [id number] EXAM:  XR CHEST PORTABLE URGENT 1V                          PROCEDURE DATE:  2022          INTERPRETATION:  EXAMINATION: XR CHEST URGENT    CLINICAL INDICATION: pre thoracentesis    TECHNIQUE: Single frontal, portable view of the chest was obtained.    COMPARISON: Chest x-ray 2022    FINDINGS:  The heart size is not well evaluated on this projection.  Moderate right pleural effusion with associated lower lung atelectasis,   markedly improved from prior exam.  No pneumothorax.    IMPRESSION: Status post thoracentesis with decreased effusion and no   complicating pneumothorax.      --- End of Report ---          BHARGAV NASCIMENTO MD; Resident Radiologist  This document has been electronically signed.  AZUL QUINONES MD; Attending Radiologist  This document has been electronically signed. Sep 23 2022 12:20PM    < end of copied text >           [facility] NEPHROLOGY- CONSULTATION NOTE    Patient is a 76y Male whom presented to the hospital with SOB. Nephrology consulted for Hyponatremia and hyperkalemia.    PAST MEDICAL & SURGICAL HISTORY:  HTN (hypertension)      GERD (gastroesophageal reflux disease)      PVD (peripheral vascular disease)      AAA (abdominal aortic aneurysm)  s/p repair in 2017      Smoker      Endoleak post (EVAR) endovascular aneurysm repair  2017      S/P ORIF (open reduction internal fixation) fracture  Right ankle, distant past      S/P cataract surgery  bilateral        No Known Allergies    Home Medications Reviewed  Hospital Medications:   MEDICATIONS  (STANDING):  albuterol/ipratropium for Nebulization 3 milliLiter(s) Nebulizer every 6 hours  budesonide 160 MICROgram(s)/formoterol 4.5 MICROgram(s) Inhaler 2 Puff(s) Inhalation two times a day  cefTRIAXone   IVPB 1000 milliGRAM(s) IV Intermittent every 24 hours  dextrose 50% Injectable 50 milliLiter(s) IV Push once  heparin   Injectable 5000 Unit(s) SubCutaneous every 8 hours  influenza  Vaccine (HIGH DOSE) 0.7 milliLiter(s) IntraMuscular once  insulin regular  human recombinant 5 Unit(s) IV Push once  metoprolol tartrate 25 milliGRAM(s) Oral two times a day  nicotine -   7 mG/24Hr(s) Patch 1 Patch Transdermal daily  NIFEdipine XL 90 milliGRAM(s) Oral daily  pantoprazole    Tablet 40 milliGRAM(s) Oral before breakfast  polyethylene glycol 3350 17 Gram(s) Oral daily  predniSONE   Tablet 40 milliGRAM(s) Oral daily  senna 1 Tablet(s) Oral two times a day    SOCIAL HISTORY:  Quit smoking tobacco cig one month ago  Denies ETOh  FAMILY HISTORY:  FH: lung disease (Child)      REVIEW OF SYSTEMS:  CONSTITUTIONAL: + generalized weakness. No fevers or chills  EYES/ENT: No visual changes;  No vertigo or throat pain   NECK: No pain or stiffness  RESPIRATORY: + cough- productive. No  wheezing, hemoptysis; No shortness of breath. Quit smoking one month ago.   CARDIOVASCULAR: No chest pain or palpitations.  GASTROINTESTINAL: No abdominal or epigastric pain. No nausea, vomiting, or hematemesis; No diarrhea or constipation. No melena or hematochezia.  GENITOURINARY: No dysuria, frequency, foamy urine, urinary urgency, incontinence or hematuria  NEUROLOGICAL: No numbness or weakness  SKIN: No itching, burning, rashes, or lesions   VASCULAR: No bilateral lower extremity edema.   All other review of systems is negative unless indicated above.    VITALS:  T(F): 97.4 (22 @ 06:20), Max: 98.4 (22 @ 17:12)  HR: 63 (22 @ 06:20)  BP: 90/56 (22 @ 06:20)  RR: 18 (22 @ 06:20)  SpO2: 99% (22 @ 06:20)  Wt(kg): --     @ 07:01  -   @ 07:00  --------------------------------------------------------  IN: 1445 mL / OUT: 1900 mL / NET: -455 mL     @ 07:01  -   @ 14:28  --------------------------------------------------------  IN: 0 mL / OUT: 600 mL / NET: -600 mL        PHYSICAL EXAM:  Constitutional: NAD  HEENT: NC AT,anicteric sclera, oropharynx clear, MMM  Neck: No JVD  Respiratory: + B/L rhonchi. CTAB, no wheezes, rales.   Cardiovascular: S1, S2, RRR  Gastrointestinal: BS+, soft, NT/ND  Extremities: No cyanosis or clubbing. No peripheral edema  Neurological: A/O x 4, no focal deficits  Psychiatric: Normal mood, normal affect  : No CVA tenderness. No maxwell.   Skin: warm and dry       LABS:      116<LL>  |  82<L>  |  52<H>  ----------------------------<  165<H>  6.9<HH>   |  17<L>  |  1.72<H>  MODERATELY HEMOLYZED  Ca    8.3<L>      24 Sep 2022 12:00  Phos  4.9     -  Mg     2.70           Creatinine Trend: 1.72 <--, 1.74 <--, 1.86 <--, 1.91 <--, 2.08 <--, 2.29 <--                        10.9   20.16 )-----------( 157      ( 24 Sep 2022 06:43 )             34.4     Urine Studies:  Urinalysis Basic - ( 22 Sep 2022 01:29 )    Color: Yellow / Appearance: Clear / S.017 / pH:   Gluc:  / Ketone: Negative  / Bili: Negative / Urobili: <2 mg/dL   Blood:  / Protein: Trace / Nitrite: Negative   Leuk Esterase: Negative / RBC:  / WBC    Sq Epi:  / Non Sq Epi:  / Bacteria:       Osmolality, Random Urine: 220 mosm/kg ( @ 12:00)  Sodium, Random Urine: <20 mmol/L ( @ 12:00)    Culture - Sputum . (22 @ 18:21)   Gram Stain:   Moderate polymorphonuclear leukocytes per low power field   Rare Squamous epithelial cells per low power field   Few Gram positive cocci in pairs seen per oil power field   Rare Gram Negative Coccobacilli seen per oil power field   Specimen Source: .Sputum Sputum Blood Gas Profile - Arterial (22 @ 14:53)   pH, Arterial: 7.41: No collection time indicated, please interpret with caution   pCO2, Arterial: 40 mmHg   pO2, Arterial: 60 mmHg   HCO3, Arterial: 25 mmol/L   Base Excess, Arterial: 0.7 mmol/L   Oxygen Saturation, Arterial: 92.9 %   Total CO2, Arterial: 27 mmol/L actate Dehydrogenase, Serum (22 @ 05:24)   Lactate Dehydrogenase, Serum: 525 U/L A1C with Estimated Average Glucose (22 @ 05:24)   A1C with Estimated Average Glucose Result: 6.3: High Risk (prediabetic) 5.7 - 6.4 % Lipid Profile (22 @ 05:24)   Cholesterol, Serum: 133 mg/dL   Triglycerides, Serum: 89 mg/dL   HDL Cholesterol, Serum: 34 mg/dL   Non HDL Cholesterol: 99: Patients Atherosclerotic Cardiovascular Disease (ASCVD) Risk   Optimal Level (mg/dL)   LDL Cholesterol (LDL-C) Culture - Acid Fast - Body Fluid w/Smear (22 @ 17:00)   Specimen Source: .Body Fluid PLEURAL FLUID   Acid Fast Bacilli Smear:   No acid fast bacilli seen by fluorochrome stain Culture - Body Fluid with Gram Stain (22 @ 17:00)   Gram Stain:   polymorphonuclear leukocytes seen   No organisms seen   by cytocentrifuge   Specimen Source: Pleural Fl Pleural Fluid   Culture Results:   No growth rocalcitonin, Serum (22 @ 12:15)   Procalcitonin, Serum: 0.17: Procalcitonin (PCT) Interpretation (ng/mL) - Diagnosis of systemic   bacterial infection/sepsis:     RADIOLOGY & ADDITIONAL STUDIES:        < from: CT Chest No Cont (22 @ 11:57) >  ACC: [id number] EXAM:  CT CHEST                          PROCEDURE DATE:  2022          INTERPRETATION:  EXAMINATION: CT CHEST    CLINICAL INDICATION: Dyspnea.    TECHNIQUE: Noncontrast CT of the chest was obtained.    COMPARISON: 2017.    FINDINGS:    AIRWAYS AND LUNGS: Tracheobronchial secretions.  Emphysema is present.   Partial left lung resection. Moderate loculated right pleural effusion   partial atelectasis right lower and middle lobes and associated patchy   and consolidative opacities right lung.    MEDIASTINUM AND PLEURA: Mediastinal adenopathy. The visualized portion of   the thyroid gland is unremarkable. There is no pneumothorax.    HEART AND VESSELS: There is cardiomegaly.  There are atherosclerotic   calcifications of the aorta and coronary arteries.  There is no   pericardial effusion.  Aortic valve calcification    UPPER ABDOMEN: Images of the upper abdomen demonstrate 7 cm hepatic mass   . Ascites.    BONES AND SOFT TISSUES: The bones are unremarkable.  The soft tissues are   unremarkable.    IMPRESSION:  7 cm hepatic mass    Partial left lung resection. Moderate loculated right pleural effusion   partial atelectasis right lower and middle lobes and associated patchy   and consolidative opacities right lung    --- End of Report ---            ANDREA NIELSON MD; Attending Radiologist  This document has been electronically signed. Sep 23 2022  2:02PM    < end of copied text >  < from: Xray Chest 1 View- PORTABLE-Urgent (Xray Chest 1 View- PORTABLE-Urgent .) (22 @ 20:17) >  ACC: [id number] EXAM:  XR CHEST PORTABLE URGENT 1V                          PROCEDURE DATE:  2022          INTERPRETATION:  EXAMINATION: XR CHEST URGENT    CLINICAL INDICATION: pre thoracentesis    TECHNIQUE: Single frontal, portable view of the chest was obtained.    COMPARISON: Chest x-ray 2022    FINDINGS:  The heart size is not well evaluated on this projection.  Moderate right pleural effusion with associated lower lung atelectasis,   markedly improved from prior exam.  No pneumothorax.    IMPRESSION: Status post thoracentesis with decreased effusion and no   complicating pneumothorax.      --- End of Report ---          BHARGAV NASCIMENTO MD; Resident Radiologist  This document has been electronically signed.  AZUL QUINONES MD; Attending Radiologist  This document has been electronically signed. Sep 23 2022 12:20PM    < end of copied text >

## 2022-09-24 NOTE — PROGRESS NOTE ADULT - SUBJECTIVE AND OBJECTIVE BOX
SUBJECTIVE / OVERNIGHT EVENTS:pt seen and examined  22     MEDICATIONS  (STANDING):  albuterol/ipratropium for Nebulization 3 milliLiter(s) Nebulizer every 6 hours  budesonide 160 MICROgram(s)/formoterol 4.5 MICROgram(s) Inhaler 2 Puff(s) Inhalation two times a day  cefTRIAXone   IVPB 1000 milliGRAM(s) IV Intermittent every 24 hours  heparin   Injectable 5000 Unit(s) SubCutaneous every 8 hours  influenza  Vaccine (HIGH DOSE) 0.7 milliLiter(s) IntraMuscular once  metoprolol tartrate 25 milliGRAM(s) Oral two times a day  nicotine -   7 mG/24Hr(s) Patch 1 Patch Transdermal daily  NIFEdipine XL 90 milliGRAM(s) Oral daily  pantoprazole    Tablet 40 milliGRAM(s) Oral before breakfast  polyethylene glycol 3350 17 Gram(s) Oral daily  predniSONE   Tablet 40 milliGRAM(s) Oral daily  senna 1 Tablet(s) Oral two times a day  sodium chloride 0.9%. 500 milliLiter(s) (50 mL/Hr) IV Continuous <Continuous>  sodium zirconium cyclosilicate 10 Gram(s) Oral every 8 hours    MEDICATIONS  (PRN):  acetaminophen     Tablet .. 650 milliGRAM(s) Oral every 6 hours PRN Temp greater or equal to 38C (100.4F), Mild Pain (1 - 3)  aluminum hydroxide/magnesium hydroxide/simethicone Suspension 30 milliLiter(s) Oral every 4 hours PRN Dyspepsia  benzonatate 100 milliGRAM(s) Oral every 8 hours PRN Cough  bisacodyl 5 milliGRAM(s) Oral every 12 hours PRN Constipation  guaifenesin/dextromethorphan Oral Liquid 10 milliLiter(s) Oral every 4 hours PRN Cough  melatonin 3 milliGRAM(s) Oral at bedtime PRN Insomnia  nicotine  Polacrilex Lozenge 2 milliGRAM(s) Oral every 2 hours PRN Breakthrough cravings    Vital Signs Last 24 Hrs  T(C): 36.7 (22 @ 17:18), Max: 36.8 (22 @ 14:00)  T(F): 98 (22 @ 17:18), Max: 98.2 (22 @ 14:00)  HR: 77 (22 @ 18:23) (60 - 78)  BP: 126/72 (22 @ 17:18) (90/56 - 126/72)  BP(mean): --  RR: 18 (22 @ 17:18) (18 - 18)  SpO2: 92% (22 @ 18:23) (90% - 99%)        Constitutional: No fever, fatigue  Skin: No rash.  Eyes: No recent vision problems or eye pain.  ENT: No congestion, ear pain, or sore throat.  Cardiovascular: No chest pain or palpation.  Respiratory: No cough, shortness of breath, congestion, or wheezing.  Gastrointestinal: No abdominal pain, nausea, vomiting, or diarrhea.  Genitourinary: No dysuria.  Musculoskeletal: No joint swelling.  Neurologic: No headache.    PHYSICAL EXAM:  GENERAL: NAD  EYES: EOMI, PERRLA  NECK: Supple, No JVD  CHEST/LUNG: dec breath sounds at bases  HEART:  S1 , S2 +  ABDOMEN: soft , bs+  EXTREMITIES:  edema+  NEUROLOGY:alert awake     LABS:      122<L>  |  85<L>  |  47<H>  ----------------------------<  158<H>  5.7<H>   |  25  |  1.59<H>    Ca    8.3<L>      24 Sep 2022 18:25  Phos  4.9       Mg     2.70           Creatinine Trend: 1.59 <--, 1.72 <--, 1.74 <--, 1.86 <--, 1.91 <--, 2.08 <--, 2.29 <--                        10.9   20.16 )-----------( 157      ( 24 Sep 2022 06:43 )             34.4     Urine Studies:  Urinalysis Basic - ( 22 Sep 2022 01:29 )    Color: Yellow / Appearance: Clear / S.017 / pH:   Gluc:  / Ketone: Negative  / Bili: Negative / Urobili: <2 mg/dL   Blood:  / Protein: Trace / Nitrite: Negative   Leuk Esterase: Negative / RBC:  / WBC    Sq Epi:  / Non Sq Epi:  / Bacteria:       Osmolality, Random Urine: 220 mosm/kg ( @ 12:00)  Sodium, Random Urine: <20 mmol/L ( @ 12:00)        ABG - ( 23 Sep 2022 14:53 )  pH, Arterial: 7.41  pH, Blood: x     /  pCO2: 40    /  pO2: 60    / HCO3: 25    / Base Excess: 0.7   /  SaO2: 92.9                    Imaging Personally Reviewed:    Consultant(s) Notes Reviewed: yes     Care Discussed with Consultants/Other Providers:yes

## 2022-09-24 NOTE — CONSULT NOTE ADULT - NS ATTEND AMEND GEN_ALL_CORE FT
ALFREDO and hyponatremia in the setting of hypovolemia.  Hyperkalemia due to ALFREDO, but degree uncertain due to hemolysis.    Isotoinc IVF will help all three.  Will give kayexelate as well X 1 and repeat labs.
elevated troponin in the setting elevated SCr, illness, and hypoxia  ECG without acute ischemic changes; pt does not report any chest pain    suspect demand ischemia  further risk stratification when stable.

## 2022-09-24 NOTE — PROVIDER CONTACT NOTE (CRITICAL VALUE NOTIFICATION) - ACTION/TREATMENT ORDERED:
Assessed bedside by Roxanna JUDD and attending Redd Palm given will recheck BMP in 1 hr
provider aware, awaiting for response.

## 2022-09-24 NOTE — CHART NOTE - NSCHARTNOTEFT_GEN_A_CORE
Patient noted to be hyperkalemic and hyponatremic. BMP 7 pm shows improvement with Na 116-->122 and K 6.9--->5.7(mildly hemolyzed). Discussed results with  who advised 10 mg Lokelma q 8 hours x48 hours as well as 50 cc/hr x10 hours of normal saline. AM BMP to be repeated. Patient noted to be hyperkalemic and hyponatremic. BMP 7 pm shows improvement with Na 116-->122 and K 6.9--->5.7(mildly hemolyzed). Discussed results with  (Nephrologist)  who advised 10 mg Lokelma q 8 hours x48 hours as well as 50 cc/hr x10 hours of normal saline. AM BMP to be repeated. EKG from earlier in shift shows no overt signs of hyperkalemia, peaked T waves or cardiac abnormalities.

## 2022-09-24 NOTE — CHART NOTE - NSCHARTNOTEFT_GEN_A_CORE
Upon chart review, patient's potassium was 5.9 and creatinine is 1.86. Repeated BMP stat and repeat potassium was 6.4 and creatine was 1.74. EKG ordered and showed no ST elevations, no peaked T--waves. Upon review of telemetry, patient had no ectopies and is in NSR. Assessed patient at the bedside. Per patient, patient denies chest pain, palpitations, and chest pressure. Patient is resting comfortably in the bed. Patient has no abnormal heart sounds. Bilateral lungs are clear upon auscultation. Diet is changed to renal restriction diet. Lokelma 10 g is ordered. Repeat Potassium is 6.9.  Hyperkalemia medications (dextrose 50% iv push & insulin 5 units iv push as per recommendation from pharmacy). STAT BMP ordered and to be drawn after insulin and dextrose are given. Nephrology consulted. Discussed with Dr. Delgado. Upon chart review, patient's potassium was 5.9 and creatinine is 1.86. Repeated BMP stat and repeat potassium was 6.4 and creatine was 1.74. EKG ordered and showed no ST elevations, no peaked T--waves. Upon review of telemetry, patient had no ectopies and is in NSR. Assessed patient at the bedside. Per patient, patient denies chest pain, palpitations, and chest pressure. Patient is resting comfortably in the bed. Patient has no abnormal heart sounds. Bilateral lungs are clear upon auscultation. Diet is changed to renal restriction diet. Lokelma 10 g is ordered. Repeat Potassium is 6.9.  Hyperkalemia medications (dextrose 50% iv push & insulin 5 units iv push as per recommendation from pharmacy). STAT BMP ordered and to be drawn after insulin and dextrose are given. Nephrology consulted. Discussed with Dr. Delgado.    Addendum 1500  Discussed with Nephrology on the plan for the hyperkalemia and hyponatremia. 1L NS bolus for hyponatremia. 30 g Kayexalate ordered. STAT BMP ordered for 1700. Upon chart review, patient's potassium was 5.9 and creatinine is 1.86. Repeated BMP stat and repeat potassium was 6.4 and creatine was 1.74. EKG ordered and showed no ST elevations, no peaked T-waves. Upon review of telemetry, patient had no ectopies and is in NSR. Assessed patient at the bedside. Per patient, patient denies chest pain, palpitations, and chest pressure. Patient is resting comfortably in the bed. Patient has no abnormal heart sounds upon auscultation. Bilateral lungs are clear upon auscultation. Diet is changed to renal restriction diet. Lokelma 10 g is ordered. Repeat Potassium is 6.9.  Hyperkalemia medications (dextrose 50% iv push & insulin 5 units iv push as per recommendation from pharmacy). STAT BMP ordered and to be drawn after insulin and dextrose are given. Nephrology consulted. Discussed with Dr. Delgado.    Addendum 1500  Discussed with Nephrology on the plan for the hyperkalemia and hyponatremia. 1L NS bolus for hyponatremia. 30 g Kayexalate ordered. STAT BMP ordered for 1700.

## 2022-09-24 NOTE — PROVIDER CONTACT NOTE (CRITICAL VALUE NOTIFICATION) - ASSESSMENT
Denies chest pain, headache, dizziness, palpitations
patient resting on bed, no s/s of chest pan and SOB, patient is asymptomatic.

## 2022-09-24 NOTE — CONSULT NOTE ADULT - ASSESSMENT
Patient is a 76y Male whom presented to the hospital with SOB. Nephrology consulted for Hyponatremia.     ALFREDO In the setting of hypovolemia. Baseline Cr unknown. On admission SCr 2.29 9/22/22. Give 1L 0.9% NS . Check I&O's , avoid nephrotoxins.   CKD Likely with CKD3 d/t HTN. Baseline Cr unknown.   Hyponatremia - Give 1L 0.9%.  Luciano appears c/w with hypovolemia. Recheck Na at 1700. Monitor sodium daily.   Hyperkalemia- Last results with moderate hemolysis.  Will treat with Kayexalate for rapid onset since patient previously given Lokelma.  Recheck potassium at 1700 and monitor daily     [facility] NEPHROLOGY  Marbin Saravia M.D.  Jeremy Ortiz D.O.  Cheri Gardner M.D.  Camila Montiel, MSN, ANP-C    Telephone: [phone]  Facsimile: [phone]    [address] [place] [address].   Patient is a 76y Male whom presented to the hospital with SOB. Nephrology consulted for Hyponatremia.     1. ALFREDO In the setting of hypovolemia. Baseline Cr unknown. On admission SCr 2.29 9/22/22. Give 1L 0.9% NS . Check I&O's , avoid nephrotoxins.   CKD Likely with CKD3 d/t HTN. Baseline Cr unknown.     2. Hyponatremia - Give 1L 0.9%.  Luciano appears c/w with hypovolemia. Recheck Na at 1700. Monitor sodium daily.     3. Hyperkalemia- Last results with moderate hemolysis.  Will treat with Kayexalate for rapid onset since patient previously given Lokelma.  Recheck potassium at 1700 and monitor daily     [facility] NEPHROLOGY  Marbin Saravia M.D.  Jeremy Ortiz D.O.  Cheri Gardner M.D.  Camila Montiel, MSN, ANP-C    Telephone: [phone]  Facsimile: [phone]    [address] [place] [address].

## 2022-09-25 NOTE — PROGRESS NOTE ADULT - SUBJECTIVE AND OBJECTIVE BOX
SUBJECTIVE / OVERNIGHT EVENTS:pt seen and examined  22     MEDICATIONS  (STANDING):  albuterol/ipratropium for Nebulization 3 milliLiter(s) Nebulizer every 6 hours  budesonide 160 MICROgram(s)/formoterol 4.5 MICROgram(s) Inhaler 2 Puff(s) Inhalation two times a day  cefTRIAXone   IVPB 1000 milliGRAM(s) IV Intermittent every 24 hours  heparin   Injectable 5000 Unit(s) SubCutaneous every 8 hours  influenza  Vaccine (HIGH DOSE) 0.7 milliLiter(s) IntraMuscular once  metoprolol tartrate 25 milliGRAM(s) Oral two times a day  nicotine -   7 mG/24Hr(s) Patch 1 Patch Transdermal daily  NIFEdipine XL 90 milliGRAM(s) Oral daily  pantoprazole    Tablet 40 milliGRAM(s) Oral before breakfast  polyethylene glycol 3350 17 Gram(s) Oral daily  predniSONE   Tablet 40 milliGRAM(s) Oral daily  senna 1 Tablet(s) Oral two times a day  sodium chloride 0.9%. 500 milliLiter(s) (50 mL/Hr) IV Continuous <Continuous>  sodium chloride 0.9%. 1000 milliLiter(s) (50 mL/Hr) IV Continuous <Continuous>  sodium zirconium cyclosilicate 10 Gram(s) Oral every 8 hours    MEDICATIONS  (PRN):  acetaminophen     Tablet .. 650 milliGRAM(s) Oral every 6 hours PRN Temp greater or equal to 38C (100.4F), Mild Pain (1 - 3)  aluminum hydroxide/magnesium hydroxide/simethicone Suspension 30 milliLiter(s) Oral every 4 hours PRN Dyspepsia  benzonatate 100 milliGRAM(s) Oral every 8 hours PRN Cough  bisacodyl 5 milliGRAM(s) Oral every 12 hours PRN Constipation  guaifenesin/dextromethorphan Oral Liquid 10 milliLiter(s) Oral every 4 hours PRN Cough  melatonin 3 milliGRAM(s) Oral at bedtime PRN Insomnia  nicotine  Polacrilex Lozenge 2 milliGRAM(s) Oral every 2 hours PRN Breakthrough cravings    Vital Signs Last 24 Hrs  T(C): 36.4 (22 @ 21:50), Max: 36.6 (22 @ 06:55)  T(F): 97.6 (22 @ 21:50), Max: 97.8 (22 @ 06:55)  HR: 80 (22 @ 21:50) (64 - 80)  BP: 133/66 (22 @ 21:50) (113/77 - 139/83)  BP(mean): --  RR: 18 (22 @ 21:50) (18 - 18)  SpO2: 92% (22 @ 21:50) (92% - 99%)          Constitutional: No fever, fatigue  Skin: No rash.  Eyes: No recent vision problems or eye pain.  ENT: No congestion, ear pain, or sore throat.  Cardiovascular: No chest pain or palpation.  Respiratory: No cough, shortness of breath, congestion, or wheezing.  Gastrointestinal: No abdominal pain, nausea, vomiting, or diarrhea.  Genitourinary: No dysuria.  Musculoskeletal: No joint swelling.  Neurologic: No headache.    PHYSICAL EXAM:  GENERAL: NAD  EYES: EOMI, PERRLA  NECK: Supple, No JVD  CHEST/LUNG: dec breath sounds at bases  HEART:  S1 , S2 +  ABDOMEN: soft , bs+  EXTREMITIES:  edema+  NEUROLOGY:alert awake     LABS:      129<L>  |  91<L>  |  38<H>  ----------------------------<  221<H>  4.9   |  31  |  1.34<H>    Ca    8.6      25 Sep 2022 16:03  Phos  4.3       Mg     3.00           Creatinine Trend: 1.34 <--, 1.44 <--, 1.59 <--, 1.72 <--, 1.74 <--, 1.86 <--, 1.91 <--, 2.08 <--, 2.29 <--                        11.4   18.93 )-----------( 168      ( 25 Sep 2022 05:40 )             37.2     Urine Studies:  Urinalysis Basic - ( 22 Sep 2022 01:29 )    Color: Yellow / Appearance: Clear / S.017 / pH:   Gluc:  / Ketone: Negative  / Bili: Negative / Urobili: <2 mg/dL   Blood:  / Protein: Trace / Nitrite: Negative   Leuk Esterase: Negative / RBC:  / WBC    Sq Epi:  / Non Sq Epi:  / Bacteria:       Osmolality, Random Urine: 220 mosm/kg ( @ 12:00)  Sodium, Random Urine: <20 mmol/L ( @ 12:00)                              Imaging Personally Reviewed:    Consultant(s) Notes Reviewed: yes     Care Discussed with Consultants/Other Providers:yes

## 2022-09-25 NOTE — PROGRESS NOTE ADULT - SUBJECTIVE AND OBJECTIVE BOX
[facility] NEPHROLOGY- CONSULTATION NOTE    Patient is a 76y Male whom presented to the hospital with SOB. Nephrology consulted for Hyponatremia and hyperkalemia and ALFREDO which was found to be in the setting of dehydration.       Pt feels much better today.  Feels hungry.      REVIEW OF SYSTEMS:  CONSTITUTIONAL: + generalized weakness significantly improved. No fevers or chills  RESPIRATORY: no cough today- was productive. No  wheezing, hemoptysis; No shortness of breath. Quit smoking one month ago.   CARDIOVASCULAR: No chest pain or palpitations.  GASTROINTESTINAL: No abdominal or epigastric pain. No nausea, vomiting, or hematemesis; No diarrhea or constipation. No melena or hematochezia.  GENITOURINARY: No dysuria, frequency, foamy urine, urinary urgency, incontinence or hematuria  VASCULAR: No bilateral lower extremity edema.       VITALS:  T(F): 97.6 (22 @ 10:50), Max: 98.2 (22 @ 14:00)  HR: 69 (22 @ 10:50)  BP: 113/77 (22 @ 10:50)  RR: 18 (22 @ 10:50)  SpO2: 95% (22 @ 10:50)  Wt(kg): --     @ 07:01  -   @ 07:00  --------------------------------------------------------  IN: 240 mL / OUT: 1650 mL / NET: -1410 mL     @ 07:01  -   @ 12:19  --------------------------------------------------------  IN: 240 mL / OUT: 600 mL / NET: -360 mL  Weight (kg): 103 ( @ 18:29)      PHYSICAL EXAM:  Constitutional: NAD  HEENT: NC AT,anicteric sclera, oropharynx clear, MMM  Neck: No JVD  Respiratory: MUCH imrpoved exam. Decreased BS with rhonchi on right lower and middle lung fields.  A few scattered rhonchi otherwise.    Cardiovascular: S1, S2, RRR  Gastrointestinal: BS+, soft, NT/ND  Extremities: No cyanosis or clubbing. No peripheral edema  Neurological: A/O x 4, no focal deficits  Psychiatric: Normal mood, normal affect  : No CVA tenderness. No maxwell.   Skin: warm and dry       LABS:      116<LL>  |  82<L>  |  52<H>  ----------------------------<  165<H>  6.9<HH>   |  17<L>  |  1.72<H>  MODERATELY HEMOLYZED  Ca    8.3<L>      24 Sep 2022 12:00  Phos  4.9       Mg     2.70           Creatinine Trend: 1.72 <--, 1.74 <--, 1.86 <--, 1.91 <--, 2.08 <--, 2.29 <--                        10.9   20.16 )-----------( 157      ( 24 Sep 2022 06:43 )             34.4     Urine Studies:  Urinalysis Basic - ( 22 Sep 2022 01:29 )    Color: Yellow / Appearance: Clear / S.017 / pH:   Gluc:  / Ketone: Negative  / Bili: Negative / Urobili: <2 mg/dL   Blood:  / Protein: Trace / Nitrite: Negative   Leuk Esterase: Negative / RBC:  / WBC    Sq Epi:  / Non Sq Epi:  / Bacteria:       Osmolality, Random Urine: 220 mosm/kg ( @ 12:00)  Sodium, Random Urine: <20 mmol/L ( @ 12:00)    Culture - Sputum . (22 @ 18:21)   Gram Stain:   Moderate polymorphonuclear leukocytes per low power field   Rare Squamous epithelial cells per low power field   Few Gram positive cocci in pairs seen per oil power field   Rare Gram Negative Coccobacilli seen per oil power field   Specimen Source: .Sputum Sputum Blood Gas Profile - Arterial (22 @ 14:53)   pH, Arterial: 7.41: No collection time indicated, please interpret with caution   pCO2, Arterial: 40 mmHg   pO2, Arterial: 60 mmHg   HCO3, Arterial: 25 mmol/L   Base Excess, Arterial: 0.7 mmol/L   Oxygen Saturation, Arterial: 92.9 %   Total CO2, Arterial: 27 mmol/L actate Dehydrogenase, Serum (22 @ 05:24)   Lactate Dehydrogenase, Serum: 525 U/L A1C with Estimated Average Glucose (22 @ 05:24)   A1C with Estimated Average Glucose Result: 6.3: High Risk (prediabetic) 5.7 - 6.4 % Lipid Profile (22 @ 05:24)   Cholesterol, Serum: 133 mg/dL   Triglycerides, Serum: 89 mg/dL   HDL Cholesterol, Serum: 34 mg/dL   Non HDL Cholesterol: 99: Patients Atherosclerotic Cardiovascular Disease (ASCVD) Risk   Optimal Level (mg/dL)   LDL Cholesterol (LDL-C) Culture - Acid Fast - Body Fluid w/Smear (22 @ 17:00)   Specimen Source: .Body Fluid PLEURAL FLUID   Acid Fast Bacilli Smear:   No acid fast bacilli seen by fluorochrome stain Culture - Body Fluid with Gram Stain (22 @ 17:00)   Gram Stain:   polymorphonuclear leukocytes seen   No organisms seen   by cytocentrifuge   Specimen Source: Pleural Fl Pleural Fluid   Culture Results:   No growth rocalcitonin, Serum (22 @ 12:15)   Procalcitonin, Serum: 0.17: Procalcitonin (PCT) Interpretation (ng/mL) - Diagnosis of systemic   bacterial infection/sepsis:     RADIOLOGY & ADDITIONAL STUDIES:        < from: CT Chest No Cont (22 @ 11:57) >  ACC: [id number] EXAM:  CT CHEST                          PROCEDURE DATE:  2022          INTERPRETATION:  EXAMINATION: CT CHEST    CLINICAL INDICATION: Dyspnea.    TECHNIQUE: Noncontrast CT of the chest was obtained.    COMPARISON: 2017.    FINDINGS:    AIRWAYS AND LUNGS: Tracheobronchial secretions.  Emphysema is present.   Partial left lung resection. Moderate loculated right pleural effusion   partial atelectasis right lower and middle lobes and associated patchy   and consolidative opacities right lung.    MEDIASTINUM AND PLEURA: Mediastinal adenopathy. The visualized portion of   the thyroid gland is unremarkable. There is no pneumothorax.    HEART AND VESSELS: There is cardiomegaly.  There are atherosclerotic   calcifications of the aorta and coronary arteries.  There is no   pericardial effusion.  Aortic valve calcification    UPPER ABDOMEN: Images of the upper abdomen demonstrate 7 cm hepatic mass   . Ascites.    BONES AND SOFT TISSUES: The bones are unremarkable.  The soft tissues are   unremarkable.    IMPRESSION:  7 cm hepatic mass    Partial left lung resection. Moderate loculated right pleural effusion   partial atelectasis right lower and middle lobes and associated patchy   and consolidative opacities right lung    --- End of Report ---            ANDREA NIELSON MD; Attending Radiologist  This document has been electronically signed. Sep 23 2022  2:02PM    < end of copied text >  < from: Xray Chest 1 View- PORTABLE-Urgent (Xray Chest 1 View- PORTABLE-Urgent .) (22 @ 20:17) >  ACC: [id number] EXAM:  XR CHEST PORTABLE URGENT 1V                          PROCEDURE DATE:  2022          INTERPRETATION:  EXAMINATION: XR CHEST URGENT    CLINICAL INDICATION: pre thoracentesis    TECHNIQUE: Single frontal, portable view of the chest was obtained.    COMPARISON: Chest x-ray 2022    FINDINGS:  The heart size is not well evaluated on this projection.  Moderate right pleural effusion with associated lower lung atelectasis,   markedly improved from prior exam.  No pneumothorax.    IMPRESSION: Status post thoracentesis with decreased effusion and no   complicating pneumothorax.      --- End of Report ---          BHARGAV NASCIMENTO MD; Resident Radiologist  This document has been electronically signed.  AZUL QUINONES MD; Attending Radiologist  This document has been electronically signed. Sep 23 2022 12:20PM    < end of copied text >

## 2022-09-25 NOTE — PROGRESS NOTE ADULT - ASSESSMENT
Patient is a 76y Male whom presented to the hospital with SOB. Nephrology consulted for Hyponatremia.     1. ALFREDO In the setting of hypovolemia. Baseline Cr unknown. On admission SCr 2.29 9/22/22. Continue gentle hydration with NS. Repeat labs this evening. Check I&O's , avoid nephrotoxins.   CKD Likely with CKD3 d/t HTN. Baseline Cr unknown.     2. Hyponatremia - Luciano appears c/w with hypovolemia.  Continue gentle hydration with NS. Repeat labs this evening. Monitor sodium daily.     3. Hyperkalemia- Several of the samples are hemolyzed, but most recent one is not.  S/p Kayexalate X 1 and Lokelma.  Continue lokelma 10g q8h X 48 and reevaluate.    Recheck potassium at 1800 and monitor    4. Hypermagnesemia- in the setting of ALFREDO.  Contributing to hyperkalemia.  IVF as above and monitor daily.     [facility] NEPHROLOGY  Marbin Saravia M.D.  Jeremy Ortiz D.O.  Cheri Gardner M.D.  Camila Montiel, BHARTI, ANP-C    Telephone: [phone]  Facsimile: [phone]    [address] [place] [id number].

## 2022-09-26 NOTE — PROGRESS NOTE ADULT - SUBJECTIVE AND OBJECTIVE BOX
Follow Up: possible PNA    Interval History/ROS: Afebrile. Slight cough. Breathing comfortably. No pain. Wants to get up and walk.     Allergies  No Known Allergies        ANTIMICROBIALS:  cefTRIAXone   IVPB 1000 every 24 hours      OTHER MEDS:  acetaminophen     Tablet .. 650 milliGRAM(s) Oral every 6 hours PRN  albuterol/ipratropium for Nebulization 3 milliLiter(s) Nebulizer every 6 hours  aluminum hydroxide/magnesium hydroxide/simethicone Suspension 30 milliLiter(s) Oral every 4 hours PRN  benzonatate 100 milliGRAM(s) Oral every 8 hours PRN  bisacodyl 5 milliGRAM(s) Oral every 12 hours PRN  budesonide 160 MICROgram(s)/formoterol 4.5 MICROgram(s) Inhaler 2 Puff(s) Inhalation two times a day  guaifenesin/dextromethorphan Oral Liquid 10 milliLiter(s) Oral every 4 hours PRN  heparin   Injectable 5000 Unit(s) SubCutaneous every 8 hours  influenza  Vaccine (HIGH DOSE) 0.7 milliLiter(s) IntraMuscular once  melatonin 3 milliGRAM(s) Oral at bedtime PRN  metoprolol tartrate 25 milliGRAM(s) Oral two times a day  nicotine  Polacrilex Lozenge 2 milliGRAM(s) Oral every 2 hours PRN  nicotine -   7 mG/24Hr(s) Patch 1 Patch Transdermal daily  NIFEdipine XL 90 milliGRAM(s) Oral daily  pantoprazole    Tablet 40 milliGRAM(s) Oral before breakfast  polyethylene glycol 3350 17 Gram(s) Oral daily  senna 1 Tablet(s) Oral two times a day  sodium chloride 0.9%. 500 milliLiter(s) IV Continuous <Continuous>  sodium chloride 0.9%. 1000 milliLiter(s) IV Continuous <Continuous>  sodium zirconium cyclosilicate 10 Gram(s) Oral every 8 hours      Vital Signs Last 24 Hrs  T(C): 36.7 (26 Sep 2022 06:20), Max: 36.7 (26 Sep 2022 06:20)  T(F): 98.1 (26 Sep 2022 06:20), Max: 98.1 (26 Sep 2022 06:20)  HR: 78 (26 Sep 2022 11:18) (68 - 83)  BP: 127/74 (26 Sep 2022 06:20) (120/69 - 133/66)  BP(mean): --  RR: 17 (26 Sep 2022 06:20) (17 - 18)  SpO2: 96% (26 Sep 2022 11:18) (91% - 98%)    Parameters below as of 26 Sep 2022 11:16  Patient On (Oxygen Delivery Method): nasal cannula        Physical Exam:  General: non toxic  Cardio: regular rate   Respiratory: nonlabored, on nasal cannula, grossly clear anteriorly   abd: nondistended  Skin: no rash                          11.4   15.90 )-----------( 157      ( 26 Sep 2022 06:15 )             37.5       09-26    135  |  94<L>  |  31<H>  ----------------------------<  98  5.0   |  34<H>  |  1.29    Ca    8.6      26 Sep 2022 06:15  Phos  2.8     09-26  Mg     3.00     09-26            MICROBIOLOGY:  Culture - Sputum (collected 09-23-22 @ 18:21)  Source: .Sputum Sputum  Gram Stain (09-24-22 @ 07:07):    Moderate polymorphonuclear leukocytes per low power field    Rare Squamous epithelial cells per low power field    Few Gram positive cocci in pairs seen per oil power field    Rare Gram Negative Coccobacilli seen per oil power field  Final Report (09-25-22 @ 18:20):    Normal Respiratory Marybel present    Culture - Acid Fast - Body Fluid w/Smear (collected 09-22-22 @ 17:00)  Source: .Body Fluid PLEURAL FLUID  Preliminary Report (09-24-22 @ 15:05):    Culture is being performed.    Culture - Fungal, Body Fluid (collected 09-22-22 @ 17:00)  Source: Pleural Fl Pleural Fluid  Preliminary Report (09-26-22 @ 07:05):    Testing in progress    Culture - Body Fluid with Gram Stain (collected 09-22-22 @ 17:00)  Source: Pleural Fl Pleural Fluid  Gram Stain (09-23-22 @ 05:08):    polymorphonuclear leukocytes seen    No organisms seen    by cytocentrifuge  Preliminary Report (09-24-22 @ 10:42):    No growth    Culture - Urine (collected 09-22-22 @ 04:33)  Source: Clean Catch Clean Catch (Midstream)  Final Report (09-23-22 @ 08:30):    <10,000 CFU/mL Normal Urogenital Marybel    Culture - Blood (collected 09-21-22 @ 21:32)  Source: .Blood Blood-Peripheral  Preliminary Report (09-23-22 @ 02:04):    No growth to date.    Culture - Blood (collected 09-21-22 @ 21:17)  Source: .Blood Blood-Peripheral  Preliminary Report (09-23-22 @ 02:03):    No growth to date.    RADIOLOGY:  Images below reviewed personally  CT Chest No Cont (09.23.22 @ 11:57)   7 cm hepatic mass  Partial left lung resection. Moderate loculated right pleural effusion   partial atelectasis right lower and middle lobes and associated patchy   and consolidative opacities right lung

## 2022-09-26 NOTE — PROGRESS NOTE ADULT - SUBJECTIVE AND OBJECTIVE BOX
[facility] NEPHROLOGY- PROGRESS NOTE    76y Male with history of HTN, COPD presents with SOB. Nephrology consulted for elevated Scr and hyponatremia.       REVIEW OF SYSTEMS:  Gen: no changes in weight  Cards: no chest pain  Resp: + dyspnea improving  GI: no nausea or vomiting or diarrhea, + decreased PO intake  Vascular: no LE edema    No Known Allergies      Hospital Medications: Medications reviewed    VITALS:  T(F): 98.1 (22 @ 06:20), Max: 98.1 (22 @ 06:20)  HR: 78 (22 @ 11:18)  BP: 127/74 (22 @ 06:20)  RR: 17 (22 @ 06:20)  SpO2: 96% (22 @ 11:18)  Wt(kg): --  Height (cm): 168.9 ( @ 18:57)  Weight (kg): 103 ( @ 18:29)  BMI (kg/m2): 36.1 ( @ 18:29)  BSA (m2): 2.12 ( @ 18:29)     @ 07:01  -   @ 07:00  --------------------------------------------------------  IN: 240 mL / OUT: 1100 mL / NET: -860 mL        PHYSICAL EXAM:    Gen: NAD, calm  Cards: RRR, +S1/S2, no M/G/R  Resp: course BS B/L  GI: soft, NT/ND, NABS  Vascular: no LE edema B/L    LABS:      135  |  94<L>  |  31<H>  ----------------------------<  98  5.0   |  34<H>  |  1.29    Ca    8.6      26 Sep 2022 06:15  Phos  2.8       Mg     3.00           Creatinine Trend: 1.29 <--, 1.34 <--, 1.44 <--, 1.59 <--, 1.72 <--, 1.74 <--, 1.86 <--, 1.91 <--, 2.08 <--, 2.29 <--                        11.4   15.90 )-----------( 157      ( 26 Sep 2022 06:15 )             37.5     Urine Studies:  Urinalysis Basic - ( 22 Sep 2022 01:29 )    Color: Yellow / Appearance: Clear / S.017 / pH:   Gluc:  / Ketone: Negative  / Bili: Negative / Urobili: <2 mg/dL   Blood:  / Protein: Trace / Nitrite: Negative   Leuk Esterase: Negative / RBC:  / WBC    Sq Epi:  / Non Sq Epi:  / Bacteria:       Osmolality, Random Urine: 220 mosm/kg ( @ 12:00)  Sodium, Random Urine: <20 mmol/L ( @ 12:00)            RADIOLOGY & ADDITIONAL STUDIES:

## 2022-09-26 NOTE — PHYSICAL THERAPY INITIAL EVALUATION ADULT - DIAGNOSIS, PT EVAL
Pt admitted for Right pleural effusion, hypercarbic respiratory failure, ALFREDO, severe hyponatremia; pt presents with decreased strength, decreased balance, and decreased aerobic capacity/endurance.

## 2022-09-26 NOTE — SWALLOW VFSS/MBS ASSESSMENT ADULT - DIAGNOSTIC IMPRESSIONS
RESULTS PENDING 1) Functional oral stage given regular solids, puree, moderately thick liquids, mildly thick liquids, and thin liquids marked by adequate bolus containment, adequate bolus manipulation, and adequate mastication/chewing. Anterior-posterior transport/transit of the bolus noted. Adequate oral clearance noted post primary swallow across consistencies.   2) Functional pharyngeal stage of swallow for regular solids, puree, and moderately thick liquids marked by adequate pharyngeal swallow trigger, adequate tongue base propulsion, adequate hyolaryngeal elevation, adequate epiglottic deflection, and adequate pharyngeal constriction noted. Patient with adequate pharyngeal clearance following trials. No Penetration or Aspiration viewed for these consistencies before, during, or after the swallow.  3) Mild pharyngeal dysphagia for mildly thick liquids and thin liquids marked by adequate initiation of the swallow trigger, reduced tongue base propulsion with reduced epiglottic deflection, adequate hyolaryngeal elevation, and reduced pharyngeal constriction noted. Patient with trace pharyngeal clearance deficits located primarily at the valleculae and pyriforms with full clearance with spontaneous re-swallow. Trace Laryngeal Penetration during the swallow is noted for large cup sips of mildly thick liquids, with full retrieval. Trace Laryngeal Penetration during the swallow is also noted for thin liquids, without retrieval. Given small single cup sips, Penetration is not reduplicated and airway protection is maintained. No Aspiration viewed before, during, or after the swallow.

## 2022-09-26 NOTE — PHYSICAL THERAPY INITIAL EVALUATION ADULT - PERTINENT HX OF CURRENT PROBLEM, REHAB EVAL
76M active smoker, COPD, lung cancer s/p Left Upper Lobe lobectomy, remote DVT (not on a/c), HTN, GERD presenting with SOB and productive cough x2 weeks found to have large Right pleural effusion, hypercarbic respiratory failure, ALFREDO, and severe hyponatremia.

## 2022-09-26 NOTE — SWALLOW VFSS/MBS ASSESSMENT ADULT - COMMENTS
Internal Medicine 9/25 "76M active smoker, COPD, lung cancer s/p QAMAR lobectomy, remote DVT (not on a/c), HTN, GERD presenting with SOB and productive cough x2 weeks found to have large R pleural effusion, hypercarbic respiratory failure, ALFREDO, severe hyponatremia."     WBC is elevated. CXR 9/23 "Moderate right pleural effusion with associated lower lung atelectasis"    Patient seen on 9/23 for initial swallow evaluation with recommendations for Regular solids with Thin liquids and Cinesophagram (See report for details).     Patient seen seated upright at Radiology suite for Cinesophagram this AM, at which time patient was awake/alert and oriented. Patient able to follow simple directions and make wants/needs known. Patient is receiving supplemental O2 via nasal cannula. Patient denied pain.

## 2022-09-26 NOTE — PROGRESS NOTE ADULT - SUBJECTIVE AND OBJECTIVE BOX
Overnight Events: None     Review Of Systems: No chest pain, shortness of breath, or palpitations, pt does report cough which he states is chronic             Current Meds:  acetaminophen     Tablet .. 650 milliGRAM(s) Oral every 6 hours PRN  albuterol/ipratropium for Nebulization 3 milliLiter(s) Nebulizer every 6 hours  aluminum hydroxide/magnesium hydroxide/simethicone Suspension 30 milliLiter(s) Oral every 4 hours PRN  benzonatate 100 milliGRAM(s) Oral every 8 hours PRN  bisacodyl 5 milliGRAM(s) Oral every 12 hours PRN  budesonide 160 MICROgram(s)/formoterol 4.5 MICROgram(s) Inhaler 2 Puff(s) Inhalation two times a day  cefTRIAXone   IVPB 1000 milliGRAM(s) IV Intermittent every 24 hours  guaifenesin/dextromethorphan Oral Liquid 10 milliLiter(s) Oral every 4 hours PRN  heparin   Injectable 5000 Unit(s) SubCutaneous every 8 hours  influenza  Vaccine (HIGH DOSE) 0.7 milliLiter(s) IntraMuscular once  melatonin 3 milliGRAM(s) Oral at bedtime PRN  metoprolol tartrate 25 milliGRAM(s) Oral two times a day  nicotine  Polacrilex Lozenge 2 milliGRAM(s) Oral every 2 hours PRN  nicotine -   7 mG/24Hr(s) Patch 1 Patch Transdermal daily  NIFEdipine XL 90 milliGRAM(s) Oral daily  pantoprazole    Tablet 40 milliGRAM(s) Oral before breakfast  polyethylene glycol 3350 17 Gram(s) Oral daily  senna 1 Tablet(s) Oral two times a day      Vitals:  T(F): 94.7 (09-26), Max: 98.1 (09-26)  HR: 78 (09-26) (68 - 83)  BP: 126/67 (09-26) (120/69 - 133/66)  RR: 18 (09-26)  SpO2: 99% (09-26)  I&O's Summary    25 Sep 2022 07:01  -  26 Sep 2022 07:00  --------------------------------------------------------  IN: 240 mL / OUT: 1100 mL / NET: -860 mL        Physical Exam:  Appearance: No acute distress; well appearing  Eyes:  pink conjunctiva  HEENT: Normal oral mucosa  Cardiovascular: RRR, S1, S2, no murmurs, rubs, or gallops; 2+ LE edema; + JVD   Respiratory: Coarse breath sounds diffusely   Gastrointestinal: soft, non-tender  Musculoskeletal: No clubbing; no joint deformity   Neurologic: Normal speech   Psychiatry: AAOx3, mood & affect appropriate  Skin: No rashes, ecchymoses, or cyanosis                          11.4   15.90 )-----------( 157      ( 26 Sep 2022 06:15 )             37.5     09-26    135  |  94<L>  |  31<H>  ----------------------------<  98  5.0   |  34<H>  |  1.29    Ca    8.6      26 Sep 2022 06:15  Phos  2.8     09-26  Mg     3.00     09-26        CARDIAC MARKERS ( 21 Sep 2022 23:11 )  1292 ng/L / x     / x     / 1180 U/L / x     / 191.1 ng/mL  CARDIAC MARKERS ( 21 Sep 2022 21:17 )  1394 ng/L / x     / x     / x     / x     / x          Serum Pro-Brain Natriuretic Peptide: 4162 pg/mL (09-21 @ 21:17)        Echo 9/23    CONCLUSIONS:  1. Mitral annular calcification, otherwise normal mitral  valve. Minimal mitral regurgitation.  2. Normal left ventricular internal dimensions and wall  thicknesses.  3. Endocardium not well visualized; grossly normal left  ventricular systolic function.  4. Mild diastolic dysfunction (Stage I).  5. Normal right ventricular size and function.    EF 61%

## 2022-09-26 NOTE — SWALLOW VFSS/MBS ASSESSMENT ADULT - NS SWALLOW VFSS REC ASPIR MON
change of breathing pattern/position upright (90Y)/gurgly voice/fever/pneumonia/throat clearing/upper respiratory infection

## 2022-09-26 NOTE — PROGRESS NOTE ADULT - SUBJECTIVE AND OBJECTIVE BOX
SUBJECTIVE / OVERNIGHT EVENTS:pt seen and examined  22     MEDICATIONS  (STANDING):  albuterol/ipratropium for Nebulization 3 milliLiter(s) Nebulizer every 6 hours  budesonide 160 MICROgram(s)/formoterol 4.5 MICROgram(s) Inhaler 2 Puff(s) Inhalation two times a day  cefTRIAXone   IVPB 1000 milliGRAM(s) IV Intermittent every 24 hours  heparin   Injectable 5000 Unit(s) SubCutaneous every 8 hours  influenza  Vaccine (HIGH DOSE) 0.7 milliLiter(s) IntraMuscular once  metoprolol tartrate 25 milliGRAM(s) Oral two times a day  nicotine -   7 mG/24Hr(s) Patch 1 Patch Transdermal daily  NIFEdipine XL 90 milliGRAM(s) Oral daily  pantoprazole    Tablet 40 milliGRAM(s) Oral before breakfast  polyethylene glycol 3350 17 Gram(s) Oral daily  senna 1 Tablet(s) Oral two times a day    MEDICATIONS  (PRN):  acetaminophen     Tablet .. 650 milliGRAM(s) Oral every 6 hours PRN Temp greater or equal to 38C (100.4F), Mild Pain (1 - 3)  aluminum hydroxide/magnesium hydroxide/simethicone Suspension 30 milliLiter(s) Oral every 4 hours PRN Dyspepsia  benzonatate 100 milliGRAM(s) Oral every 8 hours PRN Cough  bisacodyl 5 milliGRAM(s) Oral every 12 hours PRN Constipation  guaifenesin/dextromethorphan Oral Liquid 10 milliLiter(s) Oral every 4 hours PRN Cough  melatonin 3 milliGRAM(s) Oral at bedtime PRN Insomnia  nicotine  Polacrilex Lozenge 2 milliGRAM(s) Oral every 2 hours PRN Breakthrough cravings    Vital Signs Last 24 Hrs  T(C): 36.5 (22 @ 19:45), Max: 36.7 (22 @ 06:20)  T(F): 97.7 (22 @ 19:45), Max: 98.1 (22 @ 06:20)  HR: 81 (22 @ 20:31) (66 - 83)  BP: 102/59 (22 @ 19:45) (102/59 - 127/74)  BP(mean): --  RR: 18 (09-26-22 @ 19:45) (17 - 18)  SpO2: 97% (22 @ 20:31) (90% - 99%)              Constitutional: No fever, fatigue  Skin: No rash.  Eyes: No recent vision problems or eye pain.  ENT: No congestion, ear pain, or sore throat.  Cardiovascular: No chest pain or palpation.  Respiratory: No cough, shortness of breath, congestion, or wheezing.  Gastrointestinal: No abdominal pain, nausea, vomiting, or diarrhea.  Genitourinary: No dysuria.  Musculoskeletal: No joint swelling.  Neurologic: No headache.    PHYSICAL EXAM:  GENERAL: NAD  EYES: EOMI, PERRLA  NECK: Supple, No JVD  CHEST/LUNG: dec breath sounds at bases  HEART:  S1 , S2 +  ABDOMEN: soft , bs+  EXTREMITIES:  edema+  NEUROLOGY:alert awake     LABS:      135  |  94<L>  |  31<H>  ----------------------------<  98  5.0   |  34<H>  |  1.29    Ca    8.6      26 Sep 2022 06:15  Phos  2.8       Mg     3.00           Creatinine Trend: 1.29 <--, 1.34 <--, 1.44 <--, 1.59 <--, 1.72 <--, 1.74 <--, 1.86 <--, 1.91 <--, 2.08 <--, 2.29 <--                        11.4   15.90 )-----------( 157      ( 26 Sep 2022 06:15 )             37.5     Urine Studies:  Urinalysis Basic - ( 22 Sep 2022 01:29 )    Color: Yellow / Appearance: Clear / S.017 / pH:   Gluc:  / Ketone: Negative  / Bili: Negative / Urobili: <2 mg/dL   Blood:  / Protein: Trace / Nitrite: Negative   Leuk Esterase: Negative / RBC:  / WBC    Sq Epi:  / Non Sq Epi:  / Bacteria:       Osmolality, Random Urine: 220 mosm/kg ( @ 12:00)  Sodium, Random Urine: <20 mmol/L ( @ 12:00)                                        Imaging Personally Reviewed:    Consultant(s) Notes Reviewed: yes     Care Discussed with Consultants/Other Providers:yes

## 2022-09-26 NOTE — PHYSICAL THERAPY INITIAL EVALUATION ADULT - GENERAL OBSERVATIONS, REHAB EVAL
Pt encountered in semisupine position, no distress, AxOx4, with +IV, and +O2 through nasal cannula. Pt agreeable to participate in PT evaluation.

## 2022-09-26 NOTE — PROGRESS NOTE ADULT - ASSESSMENT
Mr. Sharp is a 76yoM presenting to the ED for hypercarbic resp failure found to have significant troponemia is setting of critical illness.       Recommendations  - Pt currently chest pain free  - Needs further cardiac optimization   - May benefit from gentle diuresis   - Patient also congested on auscultation with cough    La Benz MD  Cardiology Fellow     Recommendations are preliminary until cosigned by the attending      Mr. Sharp is a 76yoM presenting to the ED for hypercarbic resp failure found to have significant troponemia is setting of critical illness.       Recommendations  - Pt currently chest pain free  - Needs further cardiac optimization   - May benefit from gentle diuresis   - Patient also congested on auscultation with cough    aL Benz MD  Cardiology Fellow     Recommendations are preliminary until cosigned by the attending

## 2022-09-26 NOTE — PROGRESS NOTE ADULT - ASSESSMENT
76M active smoker, COPD, QAMAR lobectomy 2019 for cancer.   Here 9/21 with hypoxic respiratory failure, large right effusion.   s/p thoracentesis 9/22 2.1L, exudative.   Opacities on follow up CT but he doesn't seem infected and cultures negative. Outpatient Levaquin didn't help either.   Malignant? Reports being in remission but there's a 7cm hepatic mass.     Suggest  -finish Ceftriaxone tomorrow for an empiric 7 day out of caution  -f/u cytology   -workup of hepatic mass per medicine     Will sign off, call back if needed  Discussed with medicine     Amrit Lewis MD   Infectious Disease   Available on TEAMS. After 5PM and on weekends please page fellow on call or call [phone]

## 2022-09-26 NOTE — SWALLOW VFSS/MBS ASSESSMENT ADULT - RECOMMENDED FEEDING/EATING TECHNIQUES
via SMALL single cup sips; small bites; slow pace; position upright; alternate food with liquid; oral hygiene

## 2022-09-26 NOTE — PHYSICAL THERAPY INITIAL EVALUATION ADULT - PATIENT PROFILE REVIEW, REHAB EVAL
ACTIVITY: Ambulate as Tolerated; Spoke with RN Kaci Weaver prior to PT evaluation--> Pt OK for PT consult/OOB activity./yes

## 2022-09-26 NOTE — CHART NOTE - NSCHARTNOTEFT_GEN_A_CORE
Notified by primary RN that patient dropped o2 saturation to 79% while eating.   Patient is 76M active smoker, COPD, lung cancer s/p QAMAR lobectomy, remote DVT, HTN, GERD presenting with SOB and productive cough x2 weeks found to have large R pleural effusion, s/p thoracentesis 9/22 w/ >2100 ml pleural fluid removed.   Seen and examined the patient at bedside. Patient looks clinically stable with normal respiratory effort on 6L NC. Patient denies shortness of breath Notified by primary RN that patient dropped o2 saturation to 79% while eating.   Patient is 76M active smoker, COPD, lung cancer s/p QAMAR lobectomy, remote DVT, HTN, GERD presenting with SOB and productive cough x2 weeks found to have large R pleural effusion, s/p thoracentesis 9/22 w/ >2100 ml pleural fluid removed.   Seen and examined the patient at bedside. Patient looks clinically stable with normal respiratory effort on 6L NC. Patient denies shortness of breath, chest pain and any other distress. As per RN patient had multiple episodes of very brief episodes of hypoxia with movement and oxygen requirement increased from 4 L NC to 6 L NC since 6pm. Urgent CXR ordered to evaluate for hypoxia.     Constitutional: NAD; well-developed ON 6L NC  Neuro: Axox3, no focal deficits  HEENT: PERRL, clear conjunctiva  RESP: Normal respiratory effort; decreased breath sounds on the right,  CVS: Regular rate and rhythm, normal S1 and S2, no murmur/rub/gallop; No lower extremity edema; Peripheral pulses are 2+ bilaterally  ABDOMEN: Nontender to palpation, normoactive bowel sounds, no rebound/guarding; No hepatosplenomegaly    Urgent CXR ordered- prelim read - noted to be with complete white out of right side of the lung.   Curb sided with MICU attending and recommended to start AVAPS and repeat CXR after few hours. Discussed with medicine attending Dr Delgado and advised to call official MICU Consult. MICU was called to evaluate the patient. Attempted initiation of AVAPS  as per order. Patient initially refused AVAPS saying that , AVAPS is broken. AVAPS was checked and confirmed by respiratory staff in functional condition. Patient again refused AVAPS . Spoke to the patient for the second time and explained the indication and importance of AVAPS as patient has large fluid collection on the right side of the lung. Patient agreed  and placed on  AVAPS since 130 am. As per MICU recs, Urgent CXR ordered to re-evaluate R sided pleural effusion around 530 am. Will f/u . Patient is without any distress at this time. Notified by primary RN that patient dropped o2 saturation to 79% while eating.   Patient is 76M active smoker, COPD, lung cancer s/p QAMAR lobectomy, remote DVT, HTN, GERD presenting with SOB and productive cough x2 weeks found to have large R pleural effusion, s/p thoracentesis 9/22 w/ >2100 ml pleural fluid removed.   Seen and examined the patient at bedside. Patient looks clinically stable with normal respiratory effort on 6L NC. Patient denies shortness of breath, chest pain and any other distress. As per RN patient had multiple episodes of very brief episodes of hypoxia with movement and oxygen requirement increased from 4 L NC to 6 L NC since 6pm. Urgent CXR ordered to evaluate for hypoxia.     Constitutional: NAD; well-developed ON 6L NC  Neuro: Axox3, no focal deficits  HEENT: PERRL, clear conjunctiva  RESP: Normal respiratory effort; decreased breath sounds on the right,  CVS: Regular rate and rhythm, normal S1 and S2, no murmur/rub/gallop; No lower extremity edema; Peripheral pulses are 2+ bilaterally  ABDOMEN: Nontender to palpation, normoactive bowel sounds, no rebound/guarding; No hepatosplenomegaly    Urgent CXR ordered- prelim read - noted to be with complete white out of right side of the lung.   Curb sided with MICU attending and recommended to start AVAPS and repeat CXR after few hours. Discussed with medicine attending Dr Delgado and advised to call official MICU Consult. MICU was called to evaluate the patient. Attempted initiation of AVAPS  as per order. Patient initially refused AVAPS saying that , AVAPS is broken. AVAPS was checked and confirmed by respiratory staff in functional condition. Patient again refused AVAPS .   Addendum:  Spoke to the patient for the second time and explained the indication and importance of AVAPS as patient has large fluid collection on the right side of the lung. Patient agreed  and placed on  AVAPS since 130 am. As per MICU recs, Urgent CXR ordered to re-evaluate R sided pleural effusion around 530 am. Will f/u . Patient is without any distress at this time.

## 2022-09-26 NOTE — PROGRESS NOTE ADULT - ASSESSMENT
76y Male with history of HTN, COPD presents with SOB. Nephrology consulted for elevated Scr and hyponatremia.    1. ALFREDO: In setting of poor solute intake likely due to pre-renal azotemia. ALFREDO resolving with IVF. D/C IVF today and encourage PO intake. UA bland. Urine sodium low. Defer renal imaging at this time. Avoid nephrotoxins. Monitor electrolytes.    2. Hyponatremia: Due to poor solute intake given low normal urine osm. Serum Na improving with IVF. D/C IVF as above today. Monitor serum Na.    3. Hyperkalemia: Resolved. Continue with renal diet. Monitor serum potassium.    4. HTN: BP controlled. Continue with current medications. Monitor BP.      [facility] NEPHROLOGY  Marbin Saravia M.D.  Jeremy Ortiz D.O.  Cheri Gardner M.D.  Camila Montiel, MSN, ANP-C    Telephone: [phone]  Facsimile: [phone]    [id number] [id number].

## 2022-09-26 NOTE — PHYSICAL THERAPY INITIAL EVALUATION ADULT - PHYSICAL ASSIST/NONPHYSICAL ASSIST: SUPINE/SIT, REHAB EVAL
Catheterize every 4-6 hours or as needed.      Step-by-Step:  Inserting a Reusable Catheter (Man)    Date Last Reviewed: 10/1/2016    [id number] [email or web address]. [address] [place] [address], [place] [address]. All rights reserved. This information is not intended as a substitute for professional medical care. Always follow your healthcare professional's instructions.        
verbal cues/1 person assist

## 2022-09-26 NOTE — SWALLOW VFSS/MBS ASSESSMENT ADULT - ADDITIONAL RECOMMENDATIONS
1) Monitor for PO tolerance and intake 2) Reconsult this service as needed 3) This service to follow up for dysphagia management and swallow therapy 4) Patient will benefit from swallow therapy pending discharge (i.e., homecare vs rehab vs [facility] [phone]).

## 2022-09-26 NOTE — PROGRESS NOTE ADULT - ATTENDING COMMENTS
Seen and examined patient. Fellow's assessment and plan noted.     Briefly, this is 76M active smoker, COPD, lung cancer s/p QAMAR lobectomy, remote DVT (not on a/c), HTN, GERD presented with      presenting with SOB and productive cough x2 weeks found to have large R pleural effusion, hypercarbic respiratory failure, ALFREDO, severe hyponatremia. Seen and examined patient. Fellow's assessment and plan noted. Briefly, this is 76M active smoker, COPD, lung cancer s/p QAMAR lobectomy, remote DVT (not on a/c), HTN, GERD presented with hypercarbic respiratory failure in setting of COPD exacerbation, found to have moderate loculated R pleural effusion with patchy consolidation of his right lung. There is a 7 cm hepatic mass suspicious for metastatic lesions. Cardiology consulted for significant troponin elevation concerning for type II ACS. Patient states he feels better and is without any dyspnea at rest. Exam with no respiratory distress, reduced breath sounds bilaterally with wheezing. No S3 or murmur noted. Unable to visualize JV due to habitus.     Plan:  1. My impression is his symptoms are primarily driven by his pulmonary issues. Although the degree of troponin elevation suggests underlying coronary disease. Patient is currently being treated for COPD exacerbation which is contraindication for Regadenoson - pharmacologic stress test as this causes pulmonary bronchospasm. Would defer ischemia evaluation to outpatient. May consider empiric treatment with aspirin 81 mg daily (if there contraindication from bleeding risk) and rosuvastatin 20 mg daily.   2. No diuretics at this time - relatively euvolemic on exam.   3. Continue treatment of COPD as per primary team.     Discussed plan with patient's daughter Ms. Sharp, who is healthcare proxy, and hospitalist Dr. Delgado. Both are agreeable with plan.     Derick Guajardo MD  Attending Physician   Cardiology Inpatient Consult Service     Please check [email or web address]: [email or web address]" for cardiology service schedule and contact information.

## 2022-09-27 NOTE — PROGRESS NOTE ADULT - SUBJECTIVE AND OBJECTIVE BOX
CHIEF COMPLAINT:Patient is a 76y old  Male who presents with a chief complaint of Shortness of breath and cough (27 Sep 2022 09:20)      Interval Events:  overnight had desaturation while eating, CXR with white out of one side of lungs, MICU consulted and placed on bipap and patient improved.,  this morning patient without any discomfort, breathing is stable off oxygen, still has some productive cough, denies fevers, chills, chest pain, leg swelling     REVIEW OF SYSTEMS:  [x] All other systems negative except per HPI   [ ] Unable to assess ROS because ________    OBJECTIVE:  ICU Vital Signs Last 24 Hrs  T(C): 37.6 (27 Sep 2022 17:09), Max: 37.6 (27 Sep 2022 17:09)  T(F): 99.7 (27 Sep 2022 17:09), Max: 99.7 (27 Sep 2022 17:09)  HR: 83 (27 Sep 2022 17:09) (66 - 83)  BP: 133/73 (27 Sep 2022 17:09) (102/59 - 133/73)  BP(mean): --  ABP: --  ABP(mean): --  RR: 18 (27 Sep 2022 17:09) (16 - 18)  SpO2: 91% (27 Sep 2022 17:09) (90% - 97%)    O2 Parameters below as of 27 Sep 2022 17:09  Patient On (Oxygen Delivery Method): nasal cannula  O2 Flow (L/min): 6        Mode: NIV (Noninvasive Ventilation), RR (machine): 18, TV (machine): 500, FiO2: 50, PEEP: 7, ITime: 1, P-High: 30, P-Low: 10, PIP: 17      PHYSICAL EXAM:  GENERAL: NAD, well-groomed, well-developed  HEAD:  Atraumatic, Normocephalic  EYES: EOMI, PERRLA, conjunctiva and sclera clear  ENMT: No tonsillar erythema, exudates, or enlargement; Moist mucous membranes, Good dentition, No lesions  NECK: Supple, No JVD, Normal thyroid  CHEST/LUNG: rhoncorous and decreased breath sounds of right side  HEART: Regular rate and rhythm; No murmurs, rubs, or gallops  ABDOMEN: Soft, Nontender, Nondistended; Bowel sounds present  VASCULAR:  2+ Peripheral Pulses, No clubbing, cyanosis, or edema  LYMPH: No lymphadenopathy noted  SKIN: No rashes or lesions  NERVOUS SYSTEM:  Alert & Oriented X3, Good concentration; Motor Strength 5/5 B/L upper and lower extremities; DTRs 2+ intact and symmetric    HOSPITAL MEDICATIONS:  MEDICATIONS  (STANDING):  albuterol/ipratropium for Nebulization 3 milliLiter(s) Nebulizer every 6 hours  budesonide 160 MICROgram(s)/formoterol 4.5 MICROgram(s) Inhaler 2 Puff(s) Inhalation two times a day  heparin   Injectable 5000 Unit(s) SubCutaneous every 8 hours  influenza  Vaccine (HIGH DOSE) 0.7 milliLiter(s) IntraMuscular once  metoprolol tartrate 25 milliGRAM(s) Oral two times a day  nicotine -   7 mG/24Hr(s) Patch 1 Patch Transdermal daily  NIFEdipine XL 90 milliGRAM(s) Oral daily  pantoprazole    Tablet 40 milliGRAM(s) Oral before breakfast  polyethylene glycol 3350 17 Gram(s) Oral daily  senna 1 Tablet(s) Oral two times a day    MEDICATIONS  (PRN):  acetaminophen     Tablet .. 650 milliGRAM(s) Oral every 6 hours PRN Temp greater or equal to 38C (100.4F), Mild Pain (1 - 3)  aluminum hydroxide/magnesium hydroxide/simethicone Suspension 30 milliLiter(s) Oral every 4 hours PRN Dyspepsia  benzonatate 100 milliGRAM(s) Oral every 8 hours PRN Cough  bisacodyl 5 milliGRAM(s) Oral every 12 hours PRN Constipation  guaifenesin/dextromethorphan Oral Liquid 10 milliLiter(s) Oral every 4 hours PRN Cough  melatonin 3 milliGRAM(s) Oral at bedtime PRN Insomnia  nicotine  Polacrilex Lozenge 2 milliGRAM(s) Oral every 2 hours PRN Breakthrough cravings      LABS:    The Labs were reviewed by me   The Radiology was reviewed by me    EKG tracing reviewed by me    09-27    134<L>  |  94<L>  |  22  ----------------------------<  82  4.4   |  31  |  0.94  09-26    135  |  94<L>  |  31<H>  ----------------------------<  98  5.0   |  34<H>  |  1.29  09-25    129<L>  |  91<L>  |  38<H>  ----------------------------<  221<H>  4.9   |  31  |  1.34<H>    Ca    8.9      27 Sep 2022 06:04  Ca    8.6      26 Sep 2022 06:15  Ca    8.6      25 Sep 2022 16:03  Phos  2.2     09-27  Mg     2.90     09-27      Magnesium, Serum: 2.90 mg/dL (09-27-22 @ 06:04)  Magnesium, Serum: 3.00 mg/dL (09-26-22 @ 06:15)  Magnesium, Serum: 3.00 mg/dL (09-25-22 @ 16:03)  Magnesium, Serum: 3.00 mg/dL (09-25-22 @ 06:25)    Phosphorus Level, Serum: 2.2 mg/dL (09-27-22 @ 06:04)  Phosphorus Level, Serum: 2.8 mg/dL (09-26-22 @ 06:15)  Phosphorus Level, Serum: 4.3 mg/dL (09-25-22 @ 16:03)  Phosphorus Level, Serum: 4.5 mg/dL (09-25-22 @ 06:25)                                              11.3   15.75 )-----------( 158      ( 27 Sep 2022 06:04 )             36.5                         11.4   15.90 )-----------( 157      ( 26 Sep 2022 06:15 )             37.5                         11.4   18.93 )-----------( 168      ( 25 Sep 2022 05:40 )             37.2     CAPILLARY BLOOD GLUCOSE      POCT Blood Glucose.: 138 mg/dL (26 Sep 2022 21:10)        MICROBIOLOGY:     RADIOLOGY:  [ ] Reviewed and interpreted by me    Point of Care Ultrasound Findings:    PFT:    EKG:

## 2022-09-27 NOTE — CONSULT NOTE ADULT - ASSESSMENT
76M active smoker, COPD, lung cancer s/p QAMAR lobectomy, remote DVT (not on a/c), HTN, GERD presenting with SOB and productive cough x2 weeks found to have large R pleural effusion, hypercarbic respiratory failure, ALFREDO, severe hyponatremia.    #Right sided pulmonary effusion:  Patient mentating well, hemodynamically stable, and protecting their airway. Patient is breathing comfortably on 6L NC. He is s/p thoracentesis. Likey hypoxic 2/2 to reaccumulation of right sided pleural effusion .     Recommendations:   - start AVAPS  - obtain repeat  chest xray   - pulm recs  -Not a MICU Candidate at this time. Please Call MICU back with any questions/Concerns.    D/w Dr. Centeno

## 2022-09-27 NOTE — PROGRESS NOTE ADULT - ASSESSMENT
76 year old male with history of active smoking 3weeks ago quit, COPD not on controller, lung cancer s/p QAMAR lobectomy (2019, [facility], no chemo/XRT), h/o DVT (off a/c since 2019 with IVC filter placed in 2019), HTN, PVD/AAA s/p repair (2017) presenting with SOB/cough and found to have large right pleural effusion    #large right pleural effusion   #hypoxemia  #hypercapnia  #COPD exacerbation  #PNA    recommendations  s/p diagnostic and therapeutic 2.1 L thoracentesis performed appears exudative via LDH and protein, no predominant line, only PMNs in gram stain,  f/u cyto and flow cyto,   recent desaturation and CXR, likely mucus plug on reaccumulating fluid, continue agressive chest pt and airway clearance with hypertonic saline, and acapella, incentive spirometry  COPD: continue duoneb q6, prednisone 40 for 5 days total, please start symbicort, once duoneb made PRN after improvement then start atrovent  PNA: continue ceftriaxone/ azithroymcin. sputum culture normal resp jaciel, MRSA nasal swab negative, urine legionella negative  ECHO: mild diastolic dysfxn, mild TR  f/u CT chest non con: moderate loculated right PLEFF with partial atelectasis and consolidative opacities and hepatic mass  Please start low diuresis, obtain full RVP, BIPAP/AVAPS can help with keeping airway open to temporarize although patient can refuse, if he does then should obtain room air abg when off NIV overnight   will need to follow up with pulm as an outpatient    Prior to discharge:  Please email: [email or web address] to setup an appointment prior to discharge. Include the patient's name, , MRN and contact information in the email.      Pulmonary/Sleep Clinic  [address]  [place] [address]  [phone]    pulm will continue to follow    Mat Dacosta MD  PCCM PGY4  [facility] [address], [facility] [phone]     76 year old male with history of active smoking 3weeks ago quit, COPD not on controller, lung cancer s/p QAMAR lobectomy (2019, [facility], no chemo/XRT), h/o DVT (off a/c since 2019 with IVC filter placed in 2019), HTN, PVD/AAA s/p repair (2017) presenting with SOB/cough and found to have large right pleural effusion    #large right pleural effusion   #hypoxemia  #hypercapnia  #COPD exacerbation  #PNA    recommendations  s/p diagnostic and therapeutic 2.1 L thoracentesis performed appears exudative via LDH and protein, no predominant line, only PMNs in gram stain,  f/u cyto and flow cyto,   recent desaturation and CXR, likely mucus plug on reaccumulating fluid, continue agressive chest pt and airway clearance with hypertonic saline, and acapella, incentive spirometry  COPD: continue duoneb q6, prednisone 40 for 5 days total, please start symbicort, once duoneb made PRN after improvement then start atrovent  PNA: s/p ceftriaxone/ azithroymcin. sputum culture normal resp jaciel, MRSA nasal swab negative, urine legionella negative  ECHO: mild diastolic dysfxn, mild TR  f/u CT chest non con: moderate loculated right PLEFF with partial atelectasis and consolidative opacities and hepatic mass  Please start low diuresis, obtain full RVP, BIPAP/AVAPS can help with keeping airway open to temporarize although patient can refuse, if he does then should obtain room air abg when off NIV overnight   will need to follow up with pulm as an outpatient    Prior to discharge:  Please email: [email or web address] to setup an appointment prior to discharge. Include the patient's name, , MRN and contact information in the email.      Pulmonary/Sleep Clinic  [address]  [place] [address]  [phone]    pulm will continue to follow    Mat Dacosta MD  [facility] PGY4  [facility] [address], [facility] [phone]     76 year old male with history of active smoking 3weeks ago quit, COPD not on controller, lung cancer s/p QAMAR lobectomy (2019, [facility], no chemo/XRT), h/o DVT (off a/c since 2019 with IVC filter placed in 2019), HTN, PVD/AAA s/p repair (2017) presenting with SOB/cough and found to have large right pleural effusion    #large right pleural effusion   #hypoxemia  #hypercapnia  #COPD exacerbation  #PNA    recommendations  s/p diagnostic and therapeutic 2.1 L thoracentesis performed appears exudative via LDH and protein, no predominant line, only PMNs in gram stain,  f/u cyto and flow cyto,   recent desaturation and CXR, likely mucus plug on reaccumulating fluid, continue agressive chest pt and airway clearance with hypertonic saline, and acapella, incentive spirometry  COPD: continue duoneb q6, prednisone 40 for 5 days total, please start symbicort, once duoneb made PRN after improvement then start atrovent  PNA: s/p ceftriaxone/ azithroymcin. sputum culture normal resp jaciel, MRSA nasal swab negative, urine legionella negative  ECHO: mild diastolic dysfxn, mild TR  f/u CT chest non con: moderate loculated right PLEFF with partial atelectasis and consolidative opacities and hepatic mass  Please start low diuresis, obtain full RVP, BIPAP/AVAPS can temporarily help with atelectasis although patient can refuse, if he does then should obtain room air abg when off NIV overnight   will need to follow up with pulm as an outpatient    Prior to discharge:  Please email: [email or web address] to setup an appointment prior to discharge. Include the patient's name, , MRN and contact information in the email.      Pulmonary/Sleep Clinic  [address] [place] [address]  [phone]    pulm will continue to follow    Mat Dacosta MD  [facility] PGY4  [facility] [address], [facility] [phone]

## 2022-09-27 NOTE — CONSULT NOTE ADULT - SUBJECTIVE AND OBJECTIVE BOX
CHIEF COMPLAINT:    HPI:    PAST MEDICAL & SURGICAL HISTORY:  HTN (hypertension)      GERD (gastroesophageal reflux disease)      PVD (peripheral vascular disease)      AAA (abdominal aortic aneurysm)  s/p repair in 12/2017      Smoker      Endoleak post (EVAR) endovascular aneurysm repair  12/2017      S/P ORIF (open reduction internal fixation) fracture  Right ankle, distant past      S/P cataract surgery  bilateral          FAMILY HISTORY:  FH: lung disease (Child)        SOCIAL HISTORY:      Allergies    No Known Allergies    Intolerances        HOME MEDICATIONS:    REVIEW OF SYSTEMS:  General: Denies dizziness, fatigue  Eyes: Denies blurry vision  ENMT: Denies rhinorrhea  Respiratory: Denies cough, SOB  Cardiovascular: Denies palpitations, CP  Gastrointestinal: Denies abd pain, N/V/D/C, hematochezia, melena  : Denies dysuria, increased freq  Musculoskeletal: Denies edema, joint pain  Endocrine: Denies increased thirst, increased frequency  Allergic/Immunologic: Denies rashes or hives  Neuro: Denies weakness, numbness  Psych: Denies anxiety, depression  All ROS negative unless indicated above     OBJECTIVE:  ICU Vital Signs Last 24 Hrs  T(C): 36.4 (26 Sep 2022 23:00), Max: 36.7 (26 Sep 2022 06:20)  T(F): 97.6 (26 Sep 2022 23:00), Max: 98.1 (26 Sep 2022 06:20)  HR: 67 (26 Sep 2022 23:00) (66 - 83)  BP: 122/69 (26 Sep 2022 23:00) (102/59 - 127/74)  BP(mean): --  ABP: --  ABP(mean): --  RR: 18 (26 Sep 2022 23:00) (17 - 18)  SpO2: 96% (26 Sep 2022 23:00) (90% - 99%)    O2 Parameters below as of 26 Sep 2022 23:00  Patient On (Oxygen Delivery Method): nasal cannula  O2 Flow (L/min): 6        Mode: NIV (Noninvasive Ventilation), RR (machine): 18, TV (machine): 500, FiO2: 50, PEEP: 7, ITime: 1, P-High: 25, P-Low: 10, PIP: 14    09-25 @ 07:01  -  09-26 @ 07:00  --------------------------------------------------------  IN: 240 mL / OUT: 1100 mL / NET: -860 mL      CAPILLARY BLOOD GLUCOSE      POCT Blood Glucose.: 138 mg/dL (26 Sep 2022 21:10)      PHYSICAL EXAM:  CONSTITUTIONAL: NAD; well-developed  HEENT: PERRL, clear conjunctiva  RESPIRATORY: Normal respiratory effort; lungs are clear to auscultation bilaterally; No Crackles/Rhonchi/Wheezing  CARDIOVASCULAR: Regular rate and rhythm, normal S1 and S2, no murmur/rub/gallop; No lower extremity edema; Peripheral pulses are 2+ bilaterally  ABDOMEN: Nontender to palpation, normoactive bowel sounds, no rebound/guarding; No hepatosplenomegaly  MUSCULOSKELETAL: no clubbing or cyanosis of digits; no joint swelling or tenderness to palpation  EXTREMITY: Lower extremities Non-tender to palpation; non-erythematous B/L  NEURO: A&Ox3; no focal deficits   PSYCH: normal mood; Affect appropirate    HOSPITAL MEDICATIONS:  MEDICATIONS  (STANDING):  albuterol/ipratropium for Nebulization 3 milliLiter(s) Nebulizer every 6 hours  budesonide 160 MICROgram(s)/formoterol 4.5 MICROgram(s) Inhaler 2 Puff(s) Inhalation two times a day  heparin   Injectable 5000 Unit(s) SubCutaneous every 8 hours  influenza  Vaccine (HIGH DOSE) 0.7 milliLiter(s) IntraMuscular once  metoprolol tartrate 25 milliGRAM(s) Oral two times a day  nicotine -   7 mG/24Hr(s) Patch 1 Patch Transdermal daily  NIFEdipine XL 90 milliGRAM(s) Oral daily  pantoprazole    Tablet 40 milliGRAM(s) Oral before breakfast  polyethylene glycol 3350 17 Gram(s) Oral daily  senna 1 Tablet(s) Oral two times a day    MEDICATIONS  (PRN):  acetaminophen     Tablet .. 650 milliGRAM(s) Oral every 6 hours PRN Temp greater or equal to 38C (100.4F), Mild Pain (1 - 3)  aluminum hydroxide/magnesium hydroxide/simethicone Suspension 30 milliLiter(s) Oral every 4 hours PRN Dyspepsia  benzonatate 100 milliGRAM(s) Oral every 8 hours PRN Cough  bisacodyl 5 milliGRAM(s) Oral every 12 hours PRN Constipation  guaifenesin/dextromethorphan Oral Liquid 10 milliLiter(s) Oral every 4 hours PRN Cough  melatonin 3 milliGRAM(s) Oral at bedtime PRN Insomnia  nicotine  Polacrilex Lozenge 2 milliGRAM(s) Oral every 2 hours PRN Breakthrough cravings      LABS:                        11.4   15.90 )-----------( 157      ( 26 Sep 2022 06:15 )             37.5     09-26    135  |  94<L>  |  31<H>  ----------------------------<  98  5.0   |  34<H>  |  1.29    Ca    8.6      26 Sep 2022 06:15  Phos  2.8     09-26  Mg     3.00     09-26                MICROBIOLOGY:     RADIOLOGY:  [ ] Reviewed and interpreted by me    EKG: Reviewed    CHIEF COMPLAINT:    HPI:  76 year old man with history of active smoking (recently cut back), COPD, lung cancer s/p QAMAR lobectomy (2019, [facility], no chemo/XRT), ?h/o DVT (off a/c since 2019, ?IVC filter placed in 2019), HTN, PVD/AAA s/p repair (2017) presenting with SOB/cough.    Patient only able to provide limited information given that he was using BiPAP during encounter, and deferred many questions to his daughter, Laurita, who is a med-surg RN. Pt and daughter report that symptoms started ~10 days ago and have predominantly included worsening SOB and cough productive of white sputum. SOB is severe, constant, and worse with exertion. Denies fever/chills, myalgias, changes in weight, URI symptoms, chest pain, hemoptysis, syncope. He describes poor PO intake over this time; he has constipation and denies any diarrhea. He was seen as an outpatient and prescribed levofloxacin x10 days, medrol dose pack, and albuterol inhaler on 9/13; however, these interventions did not seem to help and his symptoms continued to worsen so he presented to the ED fur further evaluation and management.     In the ED found to have O2 sat 74% on RA --> 94% on 3L, VBG 7.19/75, troponin 1394, EKG chronic RBBB, Na 119, Cr 2.29, CXR large R pleural effusion.  Received ceftriaxone/azithromycin, solumedrol 125, duonebs, lasix 40 IV.  Cardiology consulted for elevated troponin, felt most likely 2/2 demand  MICU consulted for respiratory failure - recommended BiPAP 10/5, continue to trend VBG, pulm consult  Pulm consulted for pleural effusion and will evaluate the patient                      PAST MEDICAL & SURGICAL HISTORY:  HTN (hypertension)      GERD (gastroesophageal reflux disease)      PVD (peripheral vascular disease)      AAA (abdominal aortic aneurysm)  s/p repair in 12/2017      Smoker      Endoleak post (EVAR) endovascular aneurysm repair  12/2017      S/P ORIF (open reduction internal fixation) fracture  Right ankle, distant past      S/P cataract surgery  bilateral          FAMILY HISTORY:  FH: lung disease (Child)        SOCIAL HISTORY:      Allergies    No Known Allergies    Intolerances        HOME MEDICATIONS:    REVIEW OF SYSTEMS:  General: Denies dizziness, fatigue  Eyes: Denies blurry vision  ENMT: Denies rhinorrhea  Respiratory: Denies cough, SOB  Cardiovascular: Denies palpitations, CP  Gastrointestinal: Denies abd pain, N/V/D/C, hematochezia, melena  : Denies dysuria, increased freq  Musculoskeletal: Denies edema, joint pain  Endocrine: Denies increased thirst, increased frequency  Allergic/Immunologic: Denies rashes or hives  Neuro: Denies weakness, numbness  Psych: Denies anxiety, depression  All ROS negative unless indicated above     OBJECTIVE:  ICU Vital Signs Last 24 Hrs  T(C): 36.4 (26 Sep 2022 23:00), Max: 36.7 (26 Sep 2022 06:20)  T(F): 97.6 (26 Sep 2022 23:00), Max: 98.1 (26 Sep 2022 06:20)  HR: 67 (26 Sep 2022 23:00) (66 - 83)  BP: 122/69 (26 Sep 2022 23:00) (102/59 - 127/74)  BP(mean): --  ABP: --  ABP(mean): --  RR: 18 (26 Sep 2022 23:00) (17 - 18)  SpO2: 96% (26 Sep 2022 23:00) (90% - 99%)    O2 Parameters below as of 26 Sep 2022 23:00  Patient On (Oxygen Delivery Method): nasal cannula  O2 Flow (L/min): 6        Mode: NIV (Noninvasive Ventilation), RR (machine): 18, TV (machine): 500, FiO2: 50, PEEP: 7, ITime: 1, P-High: 25, P-Low: 10, PIP: 14    09-25 @ 07:01  -  09-26 @ 07:00  --------------------------------------------------------  IN: 240 mL / OUT: 1100 mL / NET: -860 mL      CAPILLARY BLOOD GLUCOSE      POCT Blood Glucose.: 138 mg/dL (26 Sep 2022 21:10)      PHYSICAL EXAM:  CONSTITUTIONAL: NAD; well-developed  HEENT: PERRL, clear conjunctiva  RESPIRATORY: Normal respiratory effort; lungs are clear to auscultation bilaterally; No Crackles/Rhonchi/Wheezing  CARDIOVASCULAR: Regular rate and rhythm, normal S1 and S2, no murmur/rub/gallop; No lower extremity edema; Peripheral pulses are 2+ bilaterally  ABDOMEN: Nontender to palpation, normoactive bowel sounds, no rebound/guarding; No hepatosplenomegaly  MUSCULOSKELETAL: no clubbing or cyanosis of digits; no joint swelling or tenderness to palpation  EXTREMITY: Lower extremities Non-tender to palpation; non-erythematous B/L  NEURO: A&Ox3; no focal deficits   PSYCH: normal mood; Affect appropirate    HOSPITAL MEDICATIONS:  MEDICATIONS  (STANDING):  albuterol/ipratropium for Nebulization 3 milliLiter(s) Nebulizer every 6 hours  budesonide 160 MICROgram(s)/formoterol 4.5 MICROgram(s) Inhaler 2 Puff(s) Inhalation two times a day  heparin   Injectable 5000 Unit(s) SubCutaneous every 8 hours  influenza  Vaccine (HIGH DOSE) 0.7 milliLiter(s) IntraMuscular once  metoprolol tartrate 25 milliGRAM(s) Oral two times a day  nicotine -   7 mG/24Hr(s) Patch 1 Patch Transdermal daily  NIFEdipine XL 90 milliGRAM(s) Oral daily  pantoprazole    Tablet 40 milliGRAM(s) Oral before breakfast  polyethylene glycol 3350 17 Gram(s) Oral daily  senna 1 Tablet(s) Oral two times a day    MEDICATIONS  (PRN):  acetaminophen     Tablet .. 650 milliGRAM(s) Oral every 6 hours PRN Temp greater or equal to 38C (100.4F), Mild Pain (1 - 3)  aluminum hydroxide/magnesium hydroxide/simethicone Suspension 30 milliLiter(s) Oral every 4 hours PRN Dyspepsia  benzonatate 100 milliGRAM(s) Oral every 8 hours PRN Cough  bisacodyl 5 milliGRAM(s) Oral every 12 hours PRN Constipation  guaifenesin/dextromethorphan Oral Liquid 10 milliLiter(s) Oral every 4 hours PRN Cough  melatonin 3 milliGRAM(s) Oral at bedtime PRN Insomnia  nicotine  Polacrilex Lozenge 2 milliGRAM(s) Oral every 2 hours PRN Breakthrough cravings      LABS:                        11.4   15.90 )-----------( 157      ( 26 Sep 2022 06:15 )             37.5     09-26    135  |  94<L>  |  31<H>  ----------------------------<  98  5.0   |  34<H>  |  1.29    Ca    8.6      26 Sep 2022 06:15  Phos  2.8     09-26  Mg     3.00     09-26                MICROBIOLOGY:     RADIOLOGY:  [ ] Reviewed and interpreted by me    EKG: Reviewed    CHIEF COMPLAINT:    HPI:  76 year old man with history of active smoking (recently cut back), COPD, lung cancer s/p QAMAR lobectomy (2019, [facility], no chemo/XRT), ?h/o DVT (off a/c since 2019, ?IVC filter placed in 2019), HTN, PVD/AAA s/p repair (2017) presenting with SOB/cough. Pt and daughter report that symptoms started ~10 days ago and have predominantly included worsening SOB and cough productive of white sputum. He describes poor PO intake over this time; he has constipation and denies any diarrhea. He was seen as an outpatient and prescribed levofloxacin x10 days, medrol dose pack, and albuterol inhaler on 9/13; however, these interventions did not seem to help and his symptoms continued to worsen so he presented to the ED fur further evaluation and management.     In the ED found to have O2 sat 74% on RA --> 94% on 3L, VBG 7.19/75, troponin 1394, EKG chronic RBBB, Na 119, Cr 2.29, CXR large R pleural effusion.  Received ceftriaxone/azithromycin, solumedrol 125, duonebs, lasix 40 IV.  Cardiology consulted for elevated troponin, felt most likely 2/2 demand  MICU consulted for respiratory failure - recommended BiPAP 10/5, continue to trend VBG, pulm consult  Pulm consulted for pleural effusion and will evaluate the patient                      PAST MEDICAL & SURGICAL HISTORY:  HTN (hypertension)      GERD (gastroesophageal reflux disease)      PVD (peripheral vascular disease)      AAA (abdominal aortic aneurysm)  s/p repair in 12/2017      Smoker      Endoleak post (EVAR) endovascular aneurysm repair  12/2017      S/P ORIF (open reduction internal fixation) fracture  Right ankle, distant past      S/P cataract surgery  bilateral          FAMILY HISTORY:  FH: lung disease (Child)        SOCIAL HISTORY:      Allergies    No Known Allergies    Intolerances        HOME MEDICATIONS:    REVIEW OF SYSTEMS:  General: Denies dizziness, fatigue  Eyes: Denies blurry vision  ENMT: Denies rhinorrhea  Respiratory: Denies cough, SOB  Cardiovascular: Denies palpitations, CP  Gastrointestinal: Denies abd pain, N/V/D/C, hematochezia, melena  : Denies dysuria, increased freq  Musculoskeletal: Denies edema, joint pain  Endocrine: Denies increased thirst, increased frequency  Allergic/Immunologic: Denies rashes or hives  Neuro: Denies weakness, numbness  Psych: Denies anxiety, depression  All ROS negative unless indicated above     OBJECTIVE:  ICU Vital Signs Last 24 Hrs  T(C): 36.4 (26 Sep 2022 23:00), Max: 36.7 (26 Sep 2022 06:20)  T(F): 97.6 (26 Sep 2022 23:00), Max: 98.1 (26 Sep 2022 06:20)  HR: 67 (26 Sep 2022 23:00) (66 - 83)  BP: 122/69 (26 Sep 2022 23:00) (102/59 - 127/74)  BP(mean): --  ABP: --  ABP(mean): --  RR: 18 (26 Sep 2022 23:00) (17 - 18)  SpO2: 96% (26 Sep 2022 23:00) (90% - 99%)    O2 Parameters below as of 26 Sep 2022 23:00  Patient On (Oxygen Delivery Method): nasal cannula  O2 Flow (L/min): 6        Mode: NIV (Noninvasive Ventilation), RR (machine): 18, TV (machine): 500, FiO2: 50, PEEP: 7, ITime: 1, P-High: 25, P-Low: 10, PIP: 14    09-25 @ 07:01  -  09-26 @ 07:00  --------------------------------------------------------  IN: 240 mL / OUT: 1100 mL / NET: -860 mL      CAPILLARY BLOOD GLUCOSE      POCT Blood Glucose.: 138 mg/dL (26 Sep 2022 21:10)      PHYSICAL EXAM:  CONSTITUTIONAL: NAD; well-developed  HEENT: PERRL, clear conjunctiva  RESPIRATORY: Normal respiratory effort; lungs are clear to auscultation bilaterally; No Crackles/Rhonchi/Wheezing  CARDIOVASCULAR: Regular rate and rhythm, normal S1 and S2, no murmur/rub/gallop; No lower extremity edema; Peripheral pulses are 2+ bilaterally  ABDOMEN: Nontender to palpation, normoactive bowel sounds, no rebound/guarding; No hepatosplenomegaly  MUSCULOSKELETAL: no clubbing or cyanosis of digits; no joint swelling or tenderness to palpation  EXTREMITY: Lower extremities Non-tender to palpation; non-erythematous B/L  NEURO: A&Ox3; no focal deficits   PSYCH: normal mood; Affect appropirate    HOSPITAL MEDICATIONS:  MEDICATIONS  (STANDING):  albuterol/ipratropium for Nebulization 3 milliLiter(s) Nebulizer every 6 hours  budesonide 160 MICROgram(s)/formoterol 4.5 MICROgram(s) Inhaler 2 Puff(s) Inhalation two times a day  heparin   Injectable 5000 Unit(s) SubCutaneous every 8 hours  influenza  Vaccine (HIGH DOSE) 0.7 milliLiter(s) IntraMuscular once  metoprolol tartrate 25 milliGRAM(s) Oral two times a day  nicotine -   7 mG/24Hr(s) Patch 1 Patch Transdermal daily  NIFEdipine XL 90 milliGRAM(s) Oral daily  pantoprazole    Tablet 40 milliGRAM(s) Oral before breakfast  polyethylene glycol 3350 17 Gram(s) Oral daily  senna 1 Tablet(s) Oral two times a day    MEDICATIONS  (PRN):  acetaminophen     Tablet .. 650 milliGRAM(s) Oral every 6 hours PRN Temp greater or equal to 38C (100.4F), Mild Pain (1 - 3)  aluminum hydroxide/magnesium hydroxide/simethicone Suspension 30 milliLiter(s) Oral every 4 hours PRN Dyspepsia  benzonatate 100 milliGRAM(s) Oral every 8 hours PRN Cough  bisacodyl 5 milliGRAM(s) Oral every 12 hours PRN Constipation  guaifenesin/dextromethorphan Oral Liquid 10 milliLiter(s) Oral every 4 hours PRN Cough  melatonin 3 milliGRAM(s) Oral at bedtime PRN Insomnia  nicotine  Polacrilex Lozenge 2 milliGRAM(s) Oral every 2 hours PRN Breakthrough cravings      LABS:                        11.4   15.90 )-----------( 157      ( 26 Sep 2022 06:15 )             37.5     09-26    135  |  94<L>  |  31<H>  ----------------------------<  98  5.0   |  34<H>  |  1.29    Ca    8.6      26 Sep 2022 06:15  Phos  2.8     09-26  Mg     3.00     09-26                MICROBIOLOGY:     RADIOLOGY:  [ ] Reviewed and interpreted by me    EKG: Reviewed    CHIEF COMPLAINT:    HPI:  77 yo M with PMH of HTN, PVD, AAA s/p repair (2017), GERD, COPD, tobacco use disorder presents to the ED for hypoxia and shortness of breath. Patient reports that he had been coughing up mucous for the past few days. He also reported shortness of breath. Denies chest pain, headache, nausea, vomiting, abdominal pain, fevers, or chills. The patient's daughter is a nurse and she reports that when she measured the patient's O2 sat at home it was in the 70s. She reports that he was also coughing up mucus and she brought him to the ED. He was seen as an outpatient and prescribed levofloxacin x10 days, medrol dose pack, and albuterol inhaler on 9/13; however, these interventions did not seem to help and his symptoms continued to worsen so he presented to the ED for further evaluation and management.     In the ED found to have O2 sat 74% on RA --> 94% on 3L, VBG 7.19/75, troponin 1394, EKG chronic RBBB, Na 119, Cr 2.29, CXR large R pleural effusion.  Received ceftriaxone/azithromycin, solumedrol 125, duonebs, lasix 40 IV.  Cardiology consulted for elevated troponin, felt most likely 2/2 demand  MICU consulted for respiratory failure - recommended BiPAP 10/5, continue to trend VBG.     Pulm consulted for pleural effusion and he is s/p diagnostic and therapeutic 2.1 L thoracentesis performed appears exudative via LDH and protein      PAST MEDICAL & SURGICAL HISTORY:  HTN (hypertension)      GERD (gastroesophageal reflux disease)      PVD (peripheral vascular disease)      AAA (abdominal aortic aneurysm)  s/p repair in 12/2017      Smoker      Endoleak post (EVAR) endovascular aneurysm repair  12/2017      S/P ORIF (open reduction internal fixation) fracture  Right ankle, distant past      S/P cataract surgery  bilateral          FAMILY HISTORY:  FH: lung disease (Child)        SOCIAL HISTORY:      Allergies    No Known Allergies    Intolerances        HOME MEDICATIONS:    REVIEW OF SYSTEMS:  General: Denies dizziness, fatigue  Eyes: Denies blurry vision  ENMT: Denies rhinorrhea  Respiratory: +SOB Denies cough  Cardiovascular: Denies palpitations, CP  Gastrointestinal: Denies abd pain, N/V/D/C, hematochezia, melena  : Denies dysuria, increased freq  Musculoskeletal: Denies edema, joint pain  Endocrine: Denies increased thirst, increased frequency  Allergic/Immunologic: Denies rashes or hives  Neuro: Denies weakness, numbness  Psych: Denies anxiety, depression  All ROS negative unless indicated above     OBJECTIVE:  ICU Vital Signs Last 24 Hrs  T(C): 36.4 (26 Sep 2022 23:00), Max: 36.7 (26 Sep 2022 06:20)  T(F): 97.6 (26 Sep 2022 23:00), Max: 98.1 (26 Sep 2022 06:20)  HR: 67 (26 Sep 2022 23:00) (66 - 83)  BP: 122/69 (26 Sep 2022 23:00) (102/59 - 127/74)  BP(mean): --  ABP: --  ABP(mean): --  RR: 18 (26 Sep 2022 23:00) (17 - 18)  SpO2: 96% (26 Sep 2022 23:00) (90% - 99%)    O2 Parameters below as of 26 Sep 2022 23:00  Patient On (Oxygen Delivery Method): nasal cannula  O2 Flow (L/min): 6        Mode: NIV (Noninvasive Ventilation), RR (machine): 18, TV (machine): 500, FiO2: 50, PEEP: 7, ITime: 1, P-High: 25, P-Low: 10, PIP: 14    09-25 @ 07:01  -  09-26 @ 07:00  --------------------------------------------------------  IN: 240 mL / OUT: 1100 mL / NET: -860 mL      CAPILLARY BLOOD GLUCOSE      POCT Blood Glucose.: 138 mg/dL (26 Sep 2022 21:10)      PHYSICAL EXAM:  CONSTITUTIONAL: NAD; well-developed ON 6L NC  HEENT: PERRL, clear conjunctiva  RESPIRATORY: Normal respiratory effort; decreased breath sounds on the right,  CARDIOVASCULAR: Regular rate and rhythm, normal S1 and S2, no murmur/rub/gallop; No lower extremity edema; Peripheral pulses are 2+ bilaterally  ABDOMEN: Nontender to palpation, normoactive bowel sounds, no rebound/guarding; No hepatosplenomegaly  MUSCULOSKELETAL: no clubbing or cyanosis of digits; no joint swelling or tenderness to palpation  EXTREMITY: Lower extremities Non-tender to palpation; non-erythematous B/L  NEURO: A&Ox3; no focal deficits   PSYCH: normal mood; Affect appropriate    HOSPITAL MEDICATIONS:  MEDICATIONS  (STANDING):  albuterol/ipratropium for Nebulization 3 milliLiter(s) Nebulizer every 6 hours  budesonide 160 MICROgram(s)/formoterol 4.5 MICROgram(s) Inhaler 2 Puff(s) Inhalation two times a day  heparin   Injectable 5000 Unit(s) SubCutaneous every 8 hours  influenza  Vaccine (HIGH DOSE) 0.7 milliLiter(s) IntraMuscular once  metoprolol tartrate 25 milliGRAM(s) Oral two times a day  nicotine -   7 mG/24Hr(s) Patch 1 Patch Transdermal daily  NIFEdipine XL 90 milliGRAM(s) Oral daily  pantoprazole    Tablet 40 milliGRAM(s) Oral before breakfast  polyethylene glycol 3350 17 Gram(s) Oral daily  senna 1 Tablet(s) Oral two times a day    MEDICATIONS  (PRN):  acetaminophen     Tablet .. 650 milliGRAM(s) Oral every 6 hours PRN Temp greater or equal to 38C (100.4F), Mild Pain (1 - 3)  aluminum hydroxide/magnesium hydroxide/simethicone Suspension 30 milliLiter(s) Oral every 4 hours PRN Dyspepsia  benzonatate 100 milliGRAM(s) Oral every 8 hours PRN Cough  bisacodyl 5 milliGRAM(s) Oral every 12 hours PRN Constipation  guaifenesin/dextromethorphan Oral Liquid 10 milliLiter(s) Oral every 4 hours PRN Cough  melatonin 3 milliGRAM(s) Oral at bedtime PRN Insomnia  nicotine  Polacrilex Lozenge 2 milliGRAM(s) Oral every 2 hours PRN Breakthrough cravings      LABS:                        11.4   15.90 )-----------( 157      ( 26 Sep 2022 06:15 )             37.5     09-26    135  |  94<L>  |  31<H>  ----------------------------<  98  5.0   |  34<H>  |  1.29    Ca    8.6      26 Sep 2022 06:15  Phos  2.8     09-26  Mg     3.00     09-26                MICROBIOLOGY:     RADIOLOGY:  [ ] Reviewed and interpreted by me    EKG: Reviewed

## 2022-09-27 NOTE — PROGRESS NOTE ADULT - SUBJECTIVE AND OBJECTIVE BOX
SUBJECTIVE / OVERNIGHT EVENTS:pt seen and examined  22     MEDICATIONS  (STANDING):  albuterol/ipratropium for Nebulization 3 milliLiter(s) Nebulizer every 6 hours  aspirin enteric coated 81 milliGRAM(s) Oral daily  atorvastatin 20 milliGRAM(s) Oral at bedtime  budesonide 160 MICROgram(s)/formoterol 4.5 MICROgram(s) Inhaler 2 Puff(s) Inhalation two times a day  heparin   Injectable 5000 Unit(s) SubCutaneous every 8 hours  influenza  Vaccine (HIGH DOSE) 0.7 milliLiter(s) IntraMuscular once  metoprolol tartrate 25 milliGRAM(s) Oral two times a day  nicotine -   7 mG/24Hr(s) Patch 1 Patch Transdermal daily  NIFEdipine XL 90 milliGRAM(s) Oral daily  pantoprazole    Tablet 40 milliGRAM(s) Oral before breakfast  polyethylene glycol 3350 17 Gram(s) Oral daily  senna 1 Tablet(s) Oral two times a day  sodium chloride 3%  Inhalation 4 milliLiter(s) Inhalation every 6 hours    MEDICATIONS  (PRN):  acetaminophen     Tablet .. 650 milliGRAM(s) Oral every 6 hours PRN Temp greater or equal to 38C (100.4F), Mild Pain (1 - 3)  aluminum hydroxide/magnesium hydroxide/simethicone Suspension 30 milliLiter(s) Oral every 4 hours PRN Dyspepsia  benzonatate 100 milliGRAM(s) Oral every 8 hours PRN Cough  bisacodyl 5 milliGRAM(s) Oral every 12 hours PRN Constipation  guaifenesin/dextromethorphan Oral Liquid 10 milliLiter(s) Oral every 4 hours PRN Cough  melatonin 3 milliGRAM(s) Oral at bedtime PRN Insomnia  nicotine  Polacrilex Lozenge 2 milliGRAM(s) Oral every 2 hours PRN Breakthrough cravings    Vital Signs Last 24 Hrs  T(C): 36.7 (22 @ 20:51), Max: 37.6 (22 @ 17:09)  T(F): 98 (22 @ 20:51), Max: 99.7 (22 @ 17:09)  HR: 76 (22 @ 20:51) (69 - 83)  BP: 144/80 (22 @ 20:51) (106/62 - 144/80)  BP(mean): --  RR: 19 (22 @ 20:51) (16 - 19)  SpO2: 90% (22 @ 20:51) (88% - 95%)            Constitutional: No fever, fatigue  Skin: No rash.  Eyes: No recent vision problems or eye pain.  ENT: No congestion, ear pain, or sore throat.  Cardiovascular: No chest pain or palpation.  Respiratory: No cough, shortness of breath, congestion, or wheezing.  Gastrointestinal: No abdominal pain, nausea, vomiting, or diarrhea.  Genitourinary: No dysuria.  Musculoskeletal: No joint swelling.  Neurologic: No headache.    PHYSICAL EXAM:  GENERAL: NAD  EYES: EOMI, PERRLA  NECK: Supple, No JVD  CHEST/LUNG: dec breath sounds at bases  HEART:  S1 , S2 +  ABDOMEN: soft , bs+  EXTREMITIES:  edema+  NEUROLOGY:alert awake     LABS:      134<L>  |  94<L>  |  22  ----------------------------<  82  4.4   |  31  |  0.94    Ca    8.9      27 Sep 2022 06:04  Phos  2.2       Mg     2.90           Creatinine Trend: 0.94 <--, 1.29 <--, 1.34 <--, 1.44 <--, 1.59 <--, 1.72 <--, 1.74 <--, 1.86 <--, 1.91 <--, 2.08 <--, 2.29 <--                        11.3   15.75 )-----------( 158      ( 27 Sep 2022 06:04 )             36.5     Urine Studies:  Urinalysis Basic - ( 22 Sep 2022 01:29 )    Color: Yellow / Appearance: Clear / S.017 / pH:   Gluc:  / Ketone: Negative  / Bili: Negative / Urobili: <2 mg/dL   Blood:  / Protein: Trace / Nitrite: Negative   Leuk Esterase: Negative / RBC:  / WBC    Sq Epi:  / Non Sq Epi:  / Bacteria:       Osmolality, Random Urine: 220 mosm/kg ( @ 12:00)  Sodium, Random Urine: <20 mmol/L ( @ 12:00)                Color: Yellow / Appearance: Clear / S.017 / pH:   Gluc:  / Ketone: Negative  / Bili: Negative / Urobili: <2 mg/dL   Blood:  / Protein: Trace / Nitrite: Negative   Leuk Esterase: Negative / RBC:  / WBC    Sq Epi:  / Non Sq Epi:  / Bacteria:       Osmolality, Random Urine: 220 mosm/kg ( @ 12:00)  Sodium, Random Urine: <20 mmol/L ( @ 12:00)                                        Imaging Personally Reviewed:    Consultant(s) Notes Reviewed: yes     Care Discussed with Consultants/Other Providers:yes

## 2022-09-27 NOTE — PROGRESS NOTE ADULT - ATTENDING COMMENTS
76 year old male with history of active smoking 3weeks ago quit, COPD, lung cancer s/p QAMAR lobectomy (2019, [facility], no chemo/XRT), h/o DVT (off a/c since 2019 with IVC filter placed in 2019),  p/w large right pleural effusion s/p tap w/ improvement. Now w/ increased R sided opacities likely reflecting reaccumulation of fluid along with atelectasis. Pt clinically stable, not in distress.  -  airway clearance and chest pt  - check sputum cx   - cont NC to maintain sat>90%  - can use NIV at night if needed or for increased WOB or SOB but appears comfortable now  - will need to reeval for poss repeat drainage of pleff, if cytology positive would place IPC

## 2022-09-27 NOTE — PROGRESS NOTE ADULT - SUBJECTIVE AND OBJECTIVE BOX
[facility] NEPHROLOGY- PROGRESS NOTE    76y Male with history of HTN, COPD presents with SOB. Nephrology consulted for elevated Scr and hyponatremia.       REVIEW OF SYSTEMS:  Gen: no changes in weight  Cards: no chest pain  Resp: + dyspnea improving  GI: no nausea or vomiting or diarrhea, + decreased PO intake  Vascular: no LE edema    No Known Allergies      Hospital Medications: Medications reviewed  VITALS:  T(F): 97.7 (22 @ 07:35), Max: 97.8 (22 @ 04:10)  HR: 75 (22 @ 08:05)  BP: 123/72 (22 @ 07:35)  RR: 18 (22 @ 07:35)  SpO2: 92% (22 @ 08:05)  Wt(kg): --      PHYSICAL EXAM:  Gen: NAD, calm  Cards: RRR, +S1/S2, no M/G/R  Resp: coarse BS B/L  GI: soft, NT/ND, NABS  Vascular: no LE edema B/L        LABS:      134<L>  |  94<L>  |  22  ----------------------------<  82  4.4   |  31  |  0.94    Ca    8.9      27 Sep 2022 06:04  Phos  2.2       Mg     2.90           Creatinine Trend: 0.94 <--, 1.29 <--, 1.34 <--, 1.44 <--, 1.59 <--, 1.72 <--, 1.74 <--, 1.86 <--, 1.91 <--, 2.08 <--, 2.29 <--                        11.3   15.75 )-----------( 158      ( 27 Sep 2022 06:04 )             36.5     Urine Studies:  Urinalysis Basic - ( 22 Sep 2022 01:29 )    Color: Yellow / Appearance: Clear / S.017 / pH:   Gluc:  / Ketone: Negative  / Bili: Negative / Urobili: <2 mg/dL   Blood:  / Protein: Trace / Nitrite: Negative   Leuk Esterase: Negative / RBC:  / WBC    Sq Epi:  / Non Sq Epi:  / Bacteria:       Osmolality, Random Urine: 220 mosm/kg ( @ 12:00)  Sodium, Random Urine: <20 mmol/L ( @ 12:00)      Gen: NAD, calm  Cards: RRR, +S1/S2, no M/G/R  Resp: course BS B/L  GI: soft, NT/ND, NABS  Vascular: no LE edema B/L                  RADIOLOGY & ADDITIONAL STUDIES:

## 2022-09-27 NOTE — CHART NOTE - NSCHARTNOTEFT_GEN_A_CORE
Discussed with Pulm fellow patient's overnight events and current status. Informed pulmonology saw patient this morning and decrease patient's NC from 6L to 3L, patient was sating within normal standards. Informed by fellow to keep patient on AVAPS at night, perform ABG in the morning after off AVAPS. Informed by pulm potential plans for repeat thoracentesis tomorrow.

## 2022-09-27 NOTE — CONSULT NOTE ADULT - ATTENDING COMMENTS
Agree with above.    Acute hypoxemic and hypercapnic respiratory failure due to COPD exacerbation + new right pleural effusion with shift of mediastinum to the left consistent with tension hydrothorax. Placed on AVAPS in ED. Currently awake and alert.    Now s/p drainage of >2100 mL of serous pleural fluid with subsequent improvement in respiratory status. Fluid is exudative, monocyte predominant. Follow-up cultures, cytopath, and flow cytometry. There is a trace residual effusion present post-procedure. Please obtain CT chest to evaluate underlying lung parenchyma/pleura.    Continue AVAPS qhs (Vt 500 mL, EPAP 7, IPAP 10-30, FiO2 60% --> decrease to 30% as tolerates). Supplemental oxygen with NC@4 LPM during the day when off AVAPS.     Agree with treatment for COPD exacerbation with ceftriaxone/azithromycin, prednisone 40 mg daily for 5 days, albuterol/ipratropium nebs, and Symbicort 160-4.5 mcg 2 puffs twice daily, rinse after use.    Follow-up blood cultures and urine Legionella. Please send off sputum culture.    Discussed with patient and son at bedside in ED. Will continue to follow.
75 yo M with PMH of HTN, PVD, AAA s/p repair (2017), GERD, COPD not on home presented with hypoxic and hypercapnic respiratory distress, requiring new bipap. Cxr concerning for opacification of R hemidiaphragm.  repeat blood gas on 10/5, if blood gas is worse would increase IPAP or trial of AVAPS with repeat blood gas  chest PT, NT suction  pancx, Abx, would broaden coverage for possible aspiration   legionella antigen, MRSA swab  steroids, nebs   pulm eval in am   reconsult as needed
pt is a 77 yo male with hx copd, active smoker, GERD, HTN, AAA repair who presented  with sob and cough, noted to have large pleural effusion, s/p thoracentesis by   pulmonary 2.1 liters, exudative, Pt with shortness of breath, cxr showing   large right pleural effusion,   PE pt alert and awake   bp 140/67 rr 18 heent no jvd  lungs dec bs right,  abdomen nontender ext no edema   o2 sat 97% room air,     pt refusing bipap     A/P  77 yo male with copd, new large pleural effusion right,     wbc 15 hgb 11  hct 37    k 5 bicarb   34 cr 1.29    A/P  77 yo male with copd, large right pleural effusion.  asked to evaluate for dyspnea,  Pt refusing bipap.  -Pocus showing large right pleural effusion  -continue bipap as tolerated  -pulmonary evaluation for repeat thoracentesis, Pt is currently  stable on 6 l nasal cannula, o2 sat 96%  -diurese as tolerated   -panculture, blood, urine,  dvt prophylaxis  -monitor urine output  -hypoxemic respiratory failure with right effusion  -pt does not require icu level care at this time, please reconsult if   condition changes.

## 2022-09-28 NOTE — PROGRESS NOTE ADULT - ASSESSMENT
76 year old male with history of active smoking 3weeks ago quit, COPD not on controller, lung cancer s/p QAMAR lobectomy (2019, [facility], no chemo/XRT), h/o DVT (off a/c since 2019 with IVC filter placed in 2019), HTN, PVD/AAA s/p repair (2017) presenting with SOB/cough and found to have large right pleural effusion    #large right pleural effusion   #hypoxemia  #hypercapnia  #COPD exacerbation  #PNA    recommendations  s/p diagnostic and therapeutic 2.1 L thoracentesis performed appears exudative via LDH and protein, no predominant line, only PMNs in gram stain,  f/u cyto and flow cyto,   recent desaturation and CXR, likely mucus plug on reaccumulating fluid, continue agressive chest pt and airway clearance with hypertonic saline, and acapella, incentive spirometry  COPD: continue duoneb q6, prednisone 40 for 5 days total, please start symbicort, once duoneb made PRN after improvement then start atrovent  PNA: s/p ceftriaxone/ azithroymcin. sputum culture normal resp jaciel, MRSA nasal swab negative, urine legionella negative  ECHO: mild diastolic dysfxn, mild TR  f/u CT chest non con: moderate loculated right PLEFF with partial atelectasis and consolidative opacities and hepatic mass  Please start low diuresis, obtain full RVP, BIPAP/AVAPS can temporarily help with atelectasis although patient can refuse, if he does then should obtain room air abg when off NIV overnight   will need to follow up with pulm as an outpatient    Prior to discharge:  Please email: [email or web address] to setup an appointment prior to discharge. Include the patient's name, , MRN and contact information in the email.      [facility]  [address] [place] [address]  [phone]    pulm will continue to follow    Mat Dacosta MD  [facility] PGY4  [facility] [address], [facility] [phone]

## 2022-09-28 NOTE — PROGRESS NOTE ADULT - SUBJECTIVE AND OBJECTIVE BOX
SUBJECTIVE / OVERNIGHT EVENTS:pt seen and examined  22     MEDICATIONS  (STANDING):  albuterol/ipratropium for Nebulization 3 milliLiter(s) Nebulizer every 6 hours  aspirin enteric coated 81 milliGRAM(s) Oral daily  atorvastatin 20 milliGRAM(s) Oral at bedtime  budesonide 160 MICROgram(s)/formoterol 4.5 MICROgram(s) Inhaler 2 Puff(s) Inhalation two times a day  furosemide   Injectable 20 milliGRAM(s) IV Push daily  heparin   Injectable 5000 Unit(s) SubCutaneous every 8 hours  influenza  Vaccine (HIGH DOSE) 0.7 milliLiter(s) IntraMuscular once  metoprolol tartrate 25 milliGRAM(s) Oral two times a day  nicotine -   7 mG/24Hr(s) Patch 1 Patch Transdermal daily  pantoprazole    Tablet 40 milliGRAM(s) Oral before breakfast  polyethylene glycol 3350 17 Gram(s) Oral daily  senna 1 Tablet(s) Oral two times a day  sodium chloride 3%  Inhalation 4 milliLiter(s) Inhalation every 6 hours    MEDICATIONS  (PRN):  acetaminophen     Tablet .. 650 milliGRAM(s) Oral every 6 hours PRN Temp greater or equal to 38C (100.4F), Mild Pain (1 - 3)  aluminum hydroxide/magnesium hydroxide/simethicone Suspension 30 milliLiter(s) Oral every 4 hours PRN Dyspepsia  benzonatate 100 milliGRAM(s) Oral every 8 hours PRN Cough  bisacodyl 5 milliGRAM(s) Oral every 12 hours PRN Constipation  guaifenesin/dextromethorphan Oral Liquid 10 milliLiter(s) Oral every 4 hours PRN Cough  melatonin 3 milliGRAM(s) Oral at bedtime PRN Insomnia  nicotine  Polacrilex Lozenge 2 milliGRAM(s) Oral every 2 hours PRN Breakthrough cravings    Vital Signs Last 24 Hrs  T(C): 37.1 (22 @ 18:38), Max: 37.1 (22 @ 10:15)  T(F): 98.7 (22 @ 18:38), Max: 98.7 (22 @ 10:15)  HR: 90 (22 @ 19:33) (72 - 90)  BP: 124/74 (22 @ 18:38) (100/64 - 124/74)  BP(mean): --  RR: 18 (22 @ 18:38) (18 - 20)  SpO2: 93% (22 @ 19:33) (89% - 96%)              Constitutional: No fever, fatigue  Skin: No rash.  Eyes: No recent vision problems or eye pain.  ENT: No congestion, ear pain, or sore throat.  Cardiovascular: No chest pain or palpation.  Respiratory: No cough, shortness of breath, congestion, or wheezing.  Gastrointestinal: No abdominal pain, nausea, vomiting, or diarrhea.  Genitourinary: No dysuria.  Musculoskeletal: No joint swelling.  Neurologic: No headache.    PHYSICAL EXAM:  GENERAL: NAD  EYES: EOMI, PERRLA  NECK: Supple, No JVD  CHEST/LUNG: dec breath sounds at bases  HEART:  S1 , S2 +  ABDOMEN: soft , bs+  EXTREMITIES:  edema+  NEUROLOGY:alert awake     LABS:      137  |  94<L>  |  15  ----------------------------<  104<H>  4.4   |  32<H>  |  0.90    Ca    8.8      28 Sep 2022 06:22  Phos  2.5       Mg     2.40           Creatinine Trend: 0.90 <--, 0.94 <--, 1.29 <--, 1.34 <--, 1.44 <--, 1.59 <--, 1.72 <--, 1.74 <--, 1.86 <--, 1.91 <--, 2.08 <--                        11.6   15.62 )-----------( 130      ( 28 Sep 2022 06:22 )             38.8     Urine Studies:  Urinalysis Basic - ( 28 Sep 2022 06:22 )    Color: Yellow / Appearance: Clear / S.013 / pH:   Gluc:  / Ketone: Negative  / Bili: Negative / Urobili: <2 mg/dL   Blood:  / Protein: Trace / Nitrite: Negative   Leuk Esterase: Negative / RBC:  / WBC    Sq Epi:  / Non Sq Epi:  / Bacteria:       Osmolality, Random Urine: 220 mosm/kg ( @ 12:00)  Sodium, Random Urine: <20 mmol/L ( @ 12:00)                          Color: Yellow / Appearance: Clear / S.017 / pH:   Gluc:  / Ketone: Negative  / Bili: Negative / Urobili: <2 mg/dL   Blood:  / Protein: Trace / Nitrite: Negative   Leuk Esterase: Negative / RBC:  / WBC    Sq Epi:  / Non Sq Epi:  / Bacteria:       Osmolality, Random Urine: 220 mosm/kg ( @ 12:00)  Sodium, Random Urine: <20 mmol/L ( @ 12:00)                                        Imaging Personally Reviewed:    Consultant(s) Notes Reviewed: yes     Care Discussed with Consultants/Other Providers:yes

## 2022-09-28 NOTE — PROGRESS NOTE ADULT - ASSESSMENT
76y Male with history of HTN, COPD presents with SOB. Nephrology consulted for elevated Scr and hyponatremia.    1. ALFREDO: In setting of poor solute intake likely due to pre-renal azotemia. ALFREDO resolved. UA bland. Urine sodium low. Defer renal imaging at this time. Avoid nephrotoxins. Monitor electrolytes.    2. Hyponatremia: Due to poor solute intake given low normal urine osm. Hyponatremia resolved s/p IVF. Monitor serum Na.    3. Hyperkalemia: Resolved. Can discontinue dietary potassium restriction. Monitor serum potassium.    4. HTN: BP low normal. Decrease nifedipine to 60 mg daily. Monitor BP.      [facility] NEPHROLOGY  Marbin Saravia M.D.  Jeremy Ortiz D.O.  Cheri Gardner M.D.  Camila Montiel, MSN, ANP-C    Telephone: [phone]  Facsimile: [phone]    [address] [place] [address].

## 2022-09-28 NOTE — CHART NOTE - NSCHARTNOTEFT_GEN_A_CORE
Notified by RN that patient c/o blurry vision. Patient seen and assessed at bedside. Patient reports blurry vision which started since yesterday which was not reported to anyone before. Patient also endorses light lightheadedness. Patient is alert and oriented x3. Speech clear, follow commands. Pupil equal and reactive to light. Extraocular movement and fields of vision intact. No focal neurological deficits noted. Fingerstick within normal range. BP stable. Case discussed with attending Dr. Delgado who recommended CT head and opthalmology consult. Attending Dr. Delgado recommended to c/w Lasix as recommended by pulmonary. Endorsed to day provider to follow up.

## 2022-09-28 NOTE — PROGRESS NOTE ADULT - SUBJECTIVE AND OBJECTIVE BOX
[facility] NEPHROLOGY- PROGRESS NOTE    76y Male with history of HTN, COPD presents with SOB. Nephrology consulted for elevated Scr and hyponatremia.       REVIEW OF SYSTEMS:  Gen: no changes in weight  Cards: no chest pain  Resp: + dyspnea improving  GI: no nausea or vomiting or diarrhea, + decreased PO intake  Vascular: no LE edema    No Known Allergies      Hospital Medications: Medications reviewed      VITALS:  T(F): 98.7 (22 @ 10:15), Max: 99.7 (22 @ 17:09)  HR: 74 (22 @ 10:15)  BP: 103/58 (22 @ 10:15)  RR: 18 (22 @ 10:15)  SpO2: 92% (22 @ 10:15)  Wt(kg): --     @ 07:01  -   @ 07:00  --------------------------------------------------------  IN: 100 mL / OUT: 0 mL / NET: 100 mL        PHYSICAL EXAM:    Gen: NAD, calm  Cards: RRR, +S1/S2, no M/G/R  Resp: course BS B/L  GI: soft, NT/ND, NABS  Vascular: no LE edema B/L with skin wrinkling        LABS:      137  |  94<L>  |  15  ----------------------------<  104<H>  4.4   |  32<H>  |  0.90    Ca    8.8      28 Sep 2022 06:22  Phos  2.5       Mg     2.40           Creatinine Trend: 0.90 <--, 0.94 <--, 1.29 <--, 1.34 <--, 1.44 <--, 1.59 <--, 1.72 <--, 1.74 <--, 1.86 <--, 1.91 <--, 2.08 <--, 2.29 <--                        11.6   15.62 )-----------( 130      ( 28 Sep 2022 06:22 )             38.8     Urine Studies:  Urinalysis Basic - ( 28 Sep 2022 06:22 )    Color: Yellow / Appearance: Clear / S.013 / pH:   Gluc:  / Ketone: Negative  / Bili: Negative / Urobili: <2 mg/dL   Blood:  / Protein: Trace / Nitrite: Negative   Leuk Esterase: Negative / RBC:  / WBC    Sq Epi:  / Non Sq Epi:  / Bacteria:       Osmolality, Random Urine: 220 mosm/kg ( @ 12:00)  Sodium, Random Urine: <20 mmol/L ( @ 12:00)     [facility] NEPHROLOGY- PROGRESS NOTE    76y Male with history of HTN, COPD presents with SOB. Nephrology consulted for elevated Scr and hyponatremia.       REVIEW OF SYSTEMS:  Gen: no changes in weight  Cards: no chest pain  Resp: + dyspnea improving  GI: no nausea or vomiting or diarrhea, + decreased PO intake  Vascular: no LE edema    No Known Allergies      Hospital Medications: Medications reviewed      VITALS:  T(F): 98.7 (22 @ 10:15), Max: 99.7 (22 @ 17:09)  HR: 74 (22 @ 10:15)  BP: 103/58 (22 @ 10:15)  RR: 18 (22 @ 10:15)  SpO2: 92% (22 @ 10:15)  Wt(kg): --     @ 07:01  -   @ 07:00  --------------------------------------------------------  IN: 100 mL / OUT: 0 mL / NET: 100 mL        PHYSICAL EXAM:    Gen: NAD, calm  Cards: RRR, +S1/S2, no M/G/R  Resp: course BS B/L, Decreased BS @ R base  GI: soft, NT/ND, NABS  Vascular: no LE edema B/L with skin wrinkling        LABS:      137  |  94<L>  |  15  ----------------------------<  104<H>  4.4   |  32<H>  |  0.90    Ca    8.8      28 Sep 2022 06:22  Phos  2.5       Mg     2.40           Creatinine Trend: 0.90 <--, 0.94 <--, 1.29 <--, 1.34 <--, 1.44 <--, 1.59 <--, 1.72 <--, 1.74 <--, 1.86 <--, 1.91 <--, 2.08 <--, 2.29 <--                        11.6   15.62 )-----------( 130      ( 28 Sep 2022 06:22 )             38.8     Urine Studies:  Urinalysis Basic - ( 28 Sep 2022 06:22 )    Color: Yellow / Appearance: Clear / S.013 / pH:   Gluc:  / Ketone: Negative  / Bili: Negative / Urobili: <2 mg/dL   Blood:  / Protein: Trace / Nitrite: Negative   Leuk Esterase: Negative / RBC:  / WBC    Sq Epi:  / Non Sq Epi:  / Bacteria:       Osmolality, Random Urine: 220 mosm/kg ( @ 12:00)  Sodium, Random Urine: <20 mmol/L ( @ 12:00)

## 2022-09-28 NOTE — PROGRESS NOTE ADULT - SUBJECTIVE AND OBJECTIVE BOX
CHIEF COMPLAINT:Patient is a 76y old  Male who presents with a chief complaint of Shortness of breath and cough (27 Sep 2022 19:17)      Interval Events:    REVIEW OF SYSTEMS:  [x] All other systems negative except per HPI   [ ] Unable to assess ROS because ________    OBJECTIVE:  ICU Vital Signs Last 24 Hrs  T(C): 36.9 (28 Sep 2022 06:02), Max: 37.6 (27 Sep 2022 17:09)  T(F): 98.5 (28 Sep 2022 06:02), Max: 99.7 (27 Sep 2022 17:09)  HR: 85 (28 Sep 2022 06:02) (72 - 85)  BP: 124/74 (28 Sep 2022 06:02) (100/64 - 144/80)  BP(mean): --  ABP: --  ABP(mean): --  RR: 20 (28 Sep 2022 06:02) (18 - 20)  SpO2: 89% (28 Sep 2022 06:02) (88% - 95%)    O2 Parameters below as of 28 Sep 2022 06:02  Patient On (Oxygen Delivery Method): nasal cannula  O2 Flow (L/min): 5        Mode: standby     @ 07:01  -   @ 07:00  --------------------------------------------------------  IN: 100 mL / OUT: 0 mL / NET: 100 mL        PHYSICAL EXAM:  GENERAL: NAD, well-groomed, well-developed  HEAD:  Atraumatic, Normocephalic  EYES: EOMI, PERRLA, conjunctiva and sclera clear  ENMT: No tonsillar erythema, exudates, or enlargement; Moist mucous membranes, Good dentition, No lesions  NECK: Supple, No JVD, Normal thyroid  CHEST/LUNG: Clear to auscultation bilaterally; No rales, rhonchi, wheezing, or rubs  HEART: Regular rate and rhythm; No murmurs, rubs, or gallops  ABDOMEN: Soft, Nontender, Nondistended; Bowel sounds present  VASCULAR:  2+ Peripheral Pulses, No clubbing, cyanosis, or edema  LYMPH: No lymphadenopathy noted  SKIN: No rashes or lesions  NERVOUS SYSTEM:  Alert & Oriented X3, Good concentration; Motor Strength 5/5 B/L upper and lower extremities; DTRs 2+ intact and symmetric    HOSPITAL MEDICATIONS:  MEDICATIONS  (STANDING):  albuterol/ipratropium for Nebulization 3 milliLiter(s) Nebulizer every 6 hours  aspirin enteric coated 81 milliGRAM(s) Oral daily  atorvastatin 20 milliGRAM(s) Oral at bedtime  budesonide 160 MICROgram(s)/formoterol 4.5 MICROgram(s) Inhaler 2 Puff(s) Inhalation two times a day  furosemide   Injectable 20 milliGRAM(s) IV Push daily  heparin   Injectable 5000 Unit(s) SubCutaneous every 8 hours  influenza  Vaccine (HIGH DOSE) 0.7 milliLiter(s) IntraMuscular once  metoprolol tartrate 25 milliGRAM(s) Oral two times a day  nicotine -   7 mG/24Hr(s) Patch 1 Patch Transdermal daily  NIFEdipine XL 90 milliGRAM(s) Oral daily  pantoprazole    Tablet 40 milliGRAM(s) Oral before breakfast  polyethylene glycol 3350 17 Gram(s) Oral daily  senna 1 Tablet(s) Oral two times a day  sodium chloride 3%  Inhalation 4 milliLiter(s) Inhalation every 6 hours    MEDICATIONS  (PRN):  acetaminophen     Tablet .. 650 milliGRAM(s) Oral every 6 hours PRN Temp greater or equal to 38C (100.4F), Mild Pain (1 - 3)  aluminum hydroxide/magnesium hydroxide/simethicone Suspension 30 milliLiter(s) Oral every 4 hours PRN Dyspepsia  benzonatate 100 milliGRAM(s) Oral every 8 hours PRN Cough  bisacodyl 5 milliGRAM(s) Oral every 12 hours PRN Constipation  guaifenesin/dextromethorphan Oral Liquid 10 milliLiter(s) Oral every 4 hours PRN Cough  melatonin 3 milliGRAM(s) Oral at bedtime PRN Insomnia  nicotine  Polacrilex Lozenge 2 milliGRAM(s) Oral every 2 hours PRN Breakthrough cravings      LABS:    The Labs were reviewed by me   The Radiology was reviewed by me    EKG tracing reviewed by me        137  |  94<L>  |  15  ----------------------------<  104<H>  4.4   |  32<H>  |  0.90      134<L>  |  94<L>  |  22  ----------------------------<  82  4.4   |  31  |  0.94      135  |  94<L>  |  31<H>  ----------------------------<  98  5.0   |  34<H>  |  1.29    Ca    8.8      28 Sep 2022 06:22  Ca    8.9      27 Sep 2022 06:04  Ca    8.6      26 Sep 2022 06:15  Phos  2.5       Mg     2.40           Magnesium, Serum: 2.40 mg/dL (22 @ 06:22)  Magnesium, Serum: 2.90 mg/dL (22 @ 06:04)  Magnesium, Serum: 3.00 mg/dL (22 @ 06:15)  Magnesium, Serum: 3.00 mg/dL (22 @ 16:03)    Phosphorus Level, Serum: 2.5 mg/dL (22 @ 06:22)  Phosphorus Level, Serum: 2.2 mg/dL (22 @ 06:04)  Phosphorus Level, Serum: 2.8 mg/dL (22 @ 06:15)  Phosphorus Level, Serum: 4.3 mg/dL (22 @ 16:03)                    Urinalysis Basic - ( 28 Sep 2022 06:22 )    Color: Yellow / Appearance: Clear / S.013 / pH: x  Gluc: x / Ketone: Negative  / Bili: Negative / Urobili: <2 mg/dL   Blood: x / Protein: Trace / Nitrite: Negative   Leuk Esterase: Negative / RBC: x / WBC x   Sq Epi: x / Non Sq Epi: x / Bacteria: x                              11.6   15.62 )-----------( 130      ( 28 Sep 2022 06:22 )             38.8                         11.3   15.75 )-----------( 158      ( 27 Sep 2022 06:04 )             36.5                         11.4   15.90 )-----------( 157      ( 26 Sep 2022 06:15 )             37.5     CAPILLARY BLOOD GLUCOSE      POCT Blood Glucose.: 112 mg/dL (28 Sep 2022 06:29)        MICROBIOLOGY:     RADIOLOGY:  [ ] Reviewed and interpreted by me    Point of Care Ultrasound Findings:    PFT:    EKG:

## 2022-09-29 NOTE — PROGRESS NOTE ADULT - ASSESSMENT
76y Male with history of HTN, COPD presents with SOB. Nephrology consulted for elevated Scr and hyponatremia.    1. ALFREDO: In setting of poor solute intake likely due to pre-renal azotemia. ALFREDO resolved. UA bland. Urine sodium low. Defer renal imaging at this time. No renal objection to CT with IV contrast as patient low risk for SAIRA. Avoid nephrotoxins. Monitor electrolytes.    2. Hyponatremia: Due to poor solute intake given low normal urine osm. Hyponatremia resolved s/p IVF. Monitor serum Na.    3. Hyperkalemia: Resolved. Dietary potassium restriction discontinued. Monitor serum potassium.    4. HTN: BP low normal for which nifedipine decreased to 60 mg daily. Monitor BP.      [facility] NEPHROLOG[facility]  Marbin Saravia M.D.  Jeremy Ortiz D.O.  Cheri Gardner M.D.  Camila Montiel, MSN, ANP-C    Telephone: [phone]  Facsimile: [phone]    [id number] [place] [id number].

## 2022-09-29 NOTE — DIETITIAN INITIAL EVALUATION ADULT - NUTRITIONGOAL OUTCOME1
1. Patient will increase vegetables intake to 3 cups per day.  2. Patient will consume well balanced meals with adequate fiber intake.

## 2022-09-29 NOTE — DIETITIAN INITIAL EVALUATION ADULT - OTHER INFO
Patient's daughter reports patient's appetite is fair-good, likes salads and fruits. Denies any chewing or swallowing difficulties, denies any nausea, vomiting, diarrhea during visit, complains about constipation. Currently on bowel regimen, and would like to try prune juice. As per chart review, patient is consuming % of meals. Noted with slightly elevated DnT8n-4.3% (9/3/2022), and POCT-112(9/28/2022), patient is on steroids. As per flow sheet, patient weighs 103kg/ 227lbs (9/24/2022). Per HIE, patient weighed 103.4kg on 2/25/2020. No weight change noted x 1.5 years. Patient's daughter reports weight loss of 20lbs/ 8% weight loss x less than 1 year. As per flow sheet, patient has 1+edema to left foot, right foot. Fluid shift might contribute to weight fluctuation.

## 2022-09-29 NOTE — DIETITIAN INITIAL EVALUATION ADULT - ORAL INTAKE PTA/DIET HISTORY
Patient's daughter by bedside provided information during interview. Patient's daughter reports patient lost 20lbs, in less than a year, due to reduced po intake. Patient has no known food allergies, does not follow a special diet at home.

## 2022-09-29 NOTE — DIETITIAN INITIAL EVALUATION ADULT - PERTINENT MEDS FT
MEDICATIONS  (STANDING):  albuterol/ipratropium for Nebulization 3 milliLiter(s) Nebulizer every 6 hours  aspirin enteric coated 81 milliGRAM(s) Oral daily  atorvastatin 20 milliGRAM(s) Oral at bedtime  budesonide 160 MICROgram(s)/formoterol 4.5 MICROgram(s) Inhaler 2 Puff(s) Inhalation two times a day  furosemide   Injectable 20 milliGRAM(s) IV Push daily  heparin   Injectable 5000 Unit(s) SubCutaneous every 8 hours  influenza  Vaccine (HIGH DOSE) 0.7 milliLiter(s) IntraMuscular once  metoprolol tartrate 25 milliGRAM(s) Oral two times a day  nicotine -   7 mG/24Hr(s) Patch 1 Patch Transdermal daily  NIFEdipine XL 60 milliGRAM(s) Oral daily  pantoprazole    Tablet 40 milliGRAM(s) Oral before breakfast  polyethylene glycol 3350 17 Gram(s) Oral daily  senna 1 Tablet(s) Oral two times a day  sodium chloride 3%  Inhalation 4 milliLiter(s) Inhalation every 6 hours    MEDICATIONS  (PRN):  acetaminophen     Tablet .. 650 milliGRAM(s) Oral every 6 hours PRN Temp greater or equal to 38C (100.4F), Mild Pain (1 - 3)  aluminum hydroxide/magnesium hydroxide/simethicone Suspension 30 milliLiter(s) Oral every 4 hours PRN Dyspepsia  benzonatate 100 milliGRAM(s) Oral every 8 hours PRN Cough  bisacodyl 5 milliGRAM(s) Oral every 12 hours PRN Constipation  guaifenesin/dextromethorphan Oral Liquid 10 milliLiter(s) Oral every 4 hours PRN Cough  melatonin 3 milliGRAM(s) Oral at bedtime PRN Insomnia  nicotine  Polacrilex Lozenge 2 milliGRAM(s) Oral every 2 hours PRN Breakthrough cravings

## 2022-09-29 NOTE — DIETITIAN INITIAL EVALUATION ADULT - ADD RECOMMEND
1. Recommend adding Ensure Max 11oz (150kcal, 30gm protein) for nutrient support.  2. Nutrition department to provide prune juice 1x/day.   3. Monitor weight, labs, po intake and tolerance, bowel movement.  1. Recommend adding Ensure Max 11oz 1x/day (150kcal, 30gm protein) for nutrient support.  2. Nutrition department to provide prune juice 1x/day.   3. Monitor weight, labs, po intake and tolerance, bowel movement.

## 2022-09-29 NOTE — DIETITIAN INITIAL EVALUATION ADULT - PERTINENT LABORATORY DATA
09-28    137  |  94<L>  |  15  ----------------------------<  104<H>  4.4   |  32<H>  |  0.90    Ca    8.8      28 Sep 2022 06:22  Phos  2.5     09-28  Mg     2.40     09-28    A1C with Estimated Average Glucose Result: 6.3 % (09-23-22 @ 05:24)

## 2022-09-29 NOTE — PROGRESS NOTE ADULT - SUBJECTIVE AND OBJECTIVE BOX
SUBJECTIVE / OVERNIGHT EVENTS:pt seen and examined  22     MEDICATIONS  (STANDING):  albuterol/ipratropium for Nebulization 3 milliLiter(s) Nebulizer every 6 hours  aspirin enteric coated 81 milliGRAM(s) Oral daily  atorvastatin 20 milliGRAM(s) Oral at bedtime  budesonide 160 MICROgram(s)/formoterol 4.5 MICROgram(s) Inhaler 2 Puff(s) Inhalation two times a day  furosemide   Injectable 20 milliGRAM(s) IV Push daily  heparin   Injectable 5000 Unit(s) SubCutaneous every 8 hours  influenza  Vaccine (HIGH DOSE) 0.7 milliLiter(s) IntraMuscular once  metoprolol tartrate 25 milliGRAM(s) Oral two times a day  nicotine -   7 mG/24Hr(s) Patch 1 Patch Transdermal daily  NIFEdipine XL 60 milliGRAM(s) Oral daily  pantoprazole    Tablet 40 milliGRAM(s) Oral before breakfast  polyethylene glycol 3350 17 Gram(s) Oral daily  senna 1 Tablet(s) Oral two times a day  sodium chloride 3%  Inhalation 4 milliLiter(s) Inhalation every 6 hours    MEDICATIONS  (PRN):  acetaminophen     Tablet .. 650 milliGRAM(s) Oral every 6 hours PRN Temp greater or equal to 38C (100.4F), Mild Pain (1 - 3)  aluminum hydroxide/magnesium hydroxide/simethicone Suspension 30 milliLiter(s) Oral every 4 hours PRN Dyspepsia  benzonatate 100 milliGRAM(s) Oral every 8 hours PRN Cough  bisacodyl 5 milliGRAM(s) Oral every 12 hours PRN Constipation  guaifenesin/dextromethorphan Oral Liquid 10 milliLiter(s) Oral every 4 hours PRN Cough  melatonin 3 milliGRAM(s) Oral at bedtime PRN Insomnia  nicotine  Polacrilex Lozenge 2 milliGRAM(s) Oral every 2 hours PRN Breakthrough cravings    Vital Signs Last 24 Hrs  T(C): 36.3 (22 @ 20:13), Max: 37.1 (22 @ 05:59)  T(F): 97.4 (22 @ 20:13), Max: 98.7 (22 @ 05:59)  HR: 71 (22 @ 20:22) (71 - 87)  BP: 127/70 (22 @ 20:13) (113/69 - 130/82)  BP(mean): --  RR: 18 (22 @ 20:13) (18 - 19)  SpO2: 98% (22 @ 20:13) (91% - 100%)                Constitutional: No fever, fatigue  Skin: No rash.  Eyes: No recent vision problems or eye pain.  ENT: No congestion, ear pain, or sore throat.  Cardiovascular: No chest pain or palpation.  Respiratory: No cough, shortness of breath, congestion, or wheezing.  Gastrointestinal: No abdominal pain, nausea, vomiting, or diarrhea.  Genitourinary: No dysuria.  Musculoskeletal: No joint swelling.  Neurologic: No headache.    PHYSICAL EXAM:  GENERAL: NAD  EYES: EOMI, PERRLA  NECK: Supple, No JVD  CHEST/LUNG: dec breath sounds at bases  HEART:  S1 , S2 +  ABDOMEN: soft , bs+  EXTREMITIES:  edema+  NEUROLOGY:alert awake     LABS:      137  |  94<L>  |  15  ----------------------------<  104<H>  4.4   |  32<H>  |  0.90    Ca    8.8      28 Sep 2022 06:22  Phos  2.5       Mg     2.40           Creatinine Trend: 0.90 <--, 0.94 <--, 1.29 <--, 1.34 <--, 1.44 <--, 1.59 <--, 1.72 <--, 1.74 <--, 1.86 <--                        11.6   15.62 )-----------( 130      ( 28 Sep 2022 06:22 )             38.8     Urine Studies:  Urinalysis Basic - ( 28 Sep 2022 06:22 )    Color: Yellow / Appearance: Clear / S.013 / pH:   Gluc:  / Ketone: Negative  / Bili: Negative / Urobili: <2 mg/dL   Blood:  / Protein: Trace / Nitrite: Negative   Leuk Esterase: Negative / RBC:  / WBC    Sq Epi:  / Non Sq Epi:  / Bacteria:                                     Color: Yellow / Appearance: Clear / S.017 / pH:   Gluc:  / Ketone: Negative  / Bili: Negative / Urobili: <2 mg/dL   Blood:  / Protein: Trace / Nitrite: Negative   Leuk Esterase: Negative / RBC:  / WBC    Sq Epi:  / Non Sq Epi:  / Bacteria:       Osmolality, Random Urine: 220 mosm/kg ( @ 12:00)  Sodium, Random Urine: <20 mmol/L ( @ 12:00)                                        Imaging Personally Reviewed:    Consultant(s) Notes Reviewed: yes     Care Discussed with Consultants/Other Providers:yes

## 2022-09-29 NOTE — PROGRESS NOTE ADULT - SUBJECTIVE AND OBJECTIVE BOX
[facility] NEPHROLOGY- PROGRESS NOTE    76y Male with history of HTN, COPD presents with SOB. Nephrology consulted for elevated Scr and hyponatremia.       REVIEW OF SYSTEMS:  Gen: no changes in weight  Cards: no chest pain  Resp: + dyspnea improving  GI: no nausea or vomiting or diarrhea  Vascular: no LE edema    No Known Allergies      Hospital Medications: Medications reviewed      VITALS:  T(F): 98.7 (22 @ 05:59), Max: 98.7 (22 @ 10:15)  HR: 82 (22 @ 08:35)  BP: 113/69 (22 @ 05:59)  RR: 19 (22 @ 05:59)  SpO2: 94% (22 @ 08:35)  Wt(kg): --     @ 07:01  -   @ 07:00  --------------------------------------------------------  IN: 480 mL / OUT: 600 mL / NET: -120 mL        PHYSICAL EXAM:    Gen: NAD, calm  Cards: RRR, +S1/S2, no M/G/R  Resp: course BS B/L, Decreased BS @ R base  GI: soft, NT/ND, NABS  Vascular: no LE edema B/L with skin wrinkling        LABS:      137  |  94<L>  |  15  ----------------------------<  104<H>  4.4   |  32<H>  |  0.90    Ca    8.8      28 Sep 2022 06:22  Phos  2.5       Mg     2.40           Creatinine Trend: 0.90 <--, 0.94 <--, 1.29 <--, 1.34 <--, 1.44 <--, 1.59 <--, 1.72 <--, 1.74 <--, 1.86 <--, 1.91 <--                        11.6   15.62 )-----------( 130      ( 28 Sep 2022 06:22 )             38.8     Urine Studies:  Urinalysis Basic - ( 28 Sep 2022 06:22 )    Color: Yellow / Appearance: Clear / S.013 / pH:   Gluc:  / Ketone: Negative  / Bili: Negative / Urobili: <2 mg/dL   Blood:  / Protein: Trace / Nitrite: Negative   Leuk Esterase: Negative / RBC:  / WBC    Sq Epi:  / Non Sq Epi:  / Bacteria:       Osmolality, Random Urine: 220 mosm/kg ( @ 12:00)  Sodium, Random Urine: <20 mmol/L ( @ 12:00)

## 2022-09-30 NOTE — CHART NOTE - NSCHARTNOTEFT_GEN_A_CORE
Daughter Laurita Sharp and patient is in agreement with comfort care only. Checked off comfort measures only, No feeding tube, a trial period of IV fluids, and use antibiotics on MOLST form. Pulmonologist Praneeth Torres and RN Tiesha Rollins at bedside also to witness. Attending Dr. Delgado made aware.

## 2022-09-30 NOTE — CONSULT NOTE ADULT - ASSESSMENT
Assessment and Recommendations:  76y male with a past medical history/ocular history of COPD, lung cancer s/p QAMAR lobectomy (2019, [facility], no chemo/XRT), ?h/o DVT (off a/c since 2019, ?IVC filter placed in 2019), HTN, PVD/AAA s/p repair (2017), malignant pleural effusion, liver mass, PCIOL OU, consulted for evaluation of blurry vision.     Visual acuity PH 20/30+2 OD, 20/25 OS. PERRL no APD, IOP within normal limits. EOM full, CVF full, color plates full.       INCOMPLETE    Outpatient Follow-up: Patient should follow-up with his/her ophthalmologist or with [facility] Department of Ophthalmology within 1 week of after discharge at:    [address]  [place] [address] [phone]    Ryder Salgado MD PGY 2  Available on Microsoft Teams Assessment and Recommendations:  76y male with a past medical history/ocular history of COPD, lung cancer s/p QAMAR lobectomy (2019, [facility], no chemo/XRT), ?h/o DVT (off a/c since 2019, ?IVC filter placed in 2019), HTN, PVD/AAA s/p repair (2017), malignant pleural effusion, liver mass, PCIOL OU, consulted for evaluation of blurry vision.     Visual acuity PH 20/30+2 OD, 20/25 OS. PERRL no APD, IOP within normal limits. EOM full, CVF full, color plates 11/12 OU.     #Posterior vitreous detachment OD  Patient noted to see black dot that moves in the right eye, consistent with PVD.   Dilated fundus exam positive for posterior vitreous detachment  Retinal detachment precautions advised.   Counseled patient to let primary team know if he has any significant worsening in his vision, worsening of his floaters, or any new flashes/curtains/shadows    #Blepharitis OU  #Dry Eye OU  Recommend warm compresses Qd  Artificial Tears 4x/day  Lacrilube Ointment at night    #Retinal Lesion OS  May represent chronic chorioretinal scar vs PRP scars   Will obtain collateral info and continue to follow    #Melanotic Nevus Left Lower Lid OS  Patient states lesion is long-standing and has not changed in size  Does not appear ulcerative.   Can monitor as an outpatient    DW Dr. Alexandra FAY PGY 3      Outpatient Follow-up: Patient should follow-up with his/her ophthalmologist or with [facility] Department of Ophthalmology within 1 week of after discharge at:    [address]  [place] [address]  [phone]    Rydre Salgado MD PGY 2  Available on Microsoft Teams Assessment and Recommendations:  76y male with a past medical history/ocular history of COPD, lung cancer s/p QAMAR lobectomy (2019, [facility], no chemo/XRT), ?h/o DVT (off a/c since 2019, ?IVC filter placed in 2019), HTN, PVD/AAA s/p repair (2017), malignant pleural effusion, liver mass, PCIOL OU, consulted for evaluation of blurry vision.     Visual acuity PH 20/30+2 OD, 20/25 OS. PERRL no APD, IOP within normal limits. EOM full, CVF full, color plates 11/12 OU.     #Posterior vitreous detachment OD  Patient noted to see black dot that moves in the right eye, consistent with PVD.   Dilated fundus exam positive for posterior vitreous detachment  Retinal detachment precautions advised.   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 She will obtain outside records and update primary team.   She stated he had a laser procedure performed in "one of the eyes" but does not remember which one.   Considering attenuated vessels on exam, could be 2/2 to prior vein occlusion.        #Melanotic Nevus Left Lower Lid OS  Patient states lesion is long-standing and has not changed in size  Does not appear ulcerative.   Can monitor as an outpatient    #Goals of Care  Per primary team notes patient is Comfort care (updated on MOLST)  Patient's daughter Laurita Sharp states she would like no further intervention for the eyes    DW Dr. Alexandra FAY PGY 3      Outpatient Follow-up: Patient should follow-up with his/her ophthalmologist or with [facility] Department of Ophthalmology within 1 week of after discharge at:    [address]  [place] [address]  [phone]    Ryder Salgado MD PGY 2  Available on Microsoft Teams Assessment and Recommendations:  76y male with a past medical history/ocular history of COPD, lung cancer s/p QAMAR lobectomy (2019, [facility], no chemo/XRT), ?h/o DVT (off a/c since 2019, ?IVC filter placed in 2019), HTN, PVD/AAA s/p repair (2017), malignant pleural effusion, liver mass, PCIOL OU, consulted for evaluation of blurry vision.     Visual acuity PH 20/30+2 OD, 20/25 OS. PERRL no APD, IOP within normal limits. EOM full, CVF full, color plates 11/12 OU.  Patient unable to tolerate slit lamp exam or sitting up secondary to clinical status (shortness of breath).     #Posterior vitreous detachment OD  Patient noted to see black dot that moves in the right eye, consistent with PVD.   Dilated fundus exam positive for posterior vitreous detachment.  Retinal detachment precautions advised.   Counseled patient to let primary team know if he has any significant worsening in his vision, worsening of his floaters, or any new flashes/curtains/shadows.    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 She will obtain outside records and update primary team.   She stated he had a laser procedure performed in "one of the eyes" but does not remember which one.   Considering attenuated vessels on exam, could be 2/2 to prior vein occlusion.        #Melanotic Nevus Left Lower Lid OS  Patient states lesion is long-standing and has not changed in size  Does not appear ulcerative.   Can monitor as an outpatient    #Goals of Care  Per primary team notes patient is Comfort care (updated on MOLST)  Patient's daughter Laurita Sharp states she would like no further intervention for the eyes    DW Dr. Alexandra FAY PGY 3      Outpatient Follow-up: Patient should follow-up with his/her ophthalmologist or with [facility] Department of Ophthalmology within 1 week of after discharge at:    [address]  [place] [address]  [phone]    Ryder Salgado MD PGY 2  Available on Microsoft Teams Assessment and Recommendations:  76y male with a past medical history/ocular history of COPD, lung cancer s/p QAMAR lobectomy (2019, [facility], no chemo/XRT), ?h/o DVT (off a/c since 2019, ?IVC filter placed in 2019), HTN, PVD/AAA s/p repair (2017), malignant pleural effusion, liver mass, PCIOL OU, consulted for evaluation of blurry vision.     Visual acuity PH 20/30+2 OD, 20/25 OS. PERRL no APD, IOP within normal limits. EOM full, CVF full, color plates 11/12 OU.  Patient unable to tolerate slit lamp exam or sitting up secondary to clinical status (shortness of breath).     #Posterior vitreous detachment OD  -Patient noted to see black dot that moves in the right eye, consistent with PVD.   -Dilated fundus exam positive for posterior vitreous detachment.  -Retinal detachment precautions advised.   -Counseled patient to let primary team know if he has any significant worsening in his vision, worsening of his floaters, or any new flashes/curtains/shadows.    #Blepharitis OU  #Dry Eye OU  -Recommend warm compresses Qd  -Artificial Tears 4x/day  -Lacrilube Ointment at night    #Retinal Lesion OS  -Outside of the arcades there is dispersion of pigment (black) and retinal atrophy from 1 o clock to 7 o clock. No hemorrhages.   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 She will obtain outside records and update primary team.   -She stated he had a laser procedure performed in "one of the eyes" but does not remember which one.   -Considering attenuated vessels seen on exam, could be 2/2 to prior vein occlusion.        #Melanotic Nevus Left Lower Lid OS  -Patient states lesion is long-standing and has not changed in size  -Does not appear ulcerative.   -Can monitor as an outpatient    #Goals of Care  -Per primary team notes patient is Comfort care (updated on MOLST)  -Patient's daughter Laurita Sharp states she would like no further intervention for the eyes    SDW Dr. Alexandra FAY PGY 3      Outpatient Follow-up: Patient should follow-up with his/her ophthalmologist or with [facility] Department of Ophthalmology within 1 week of after discharge at:    [address]  [place] [address]  [phone]    Ryder Salgado MD PGY 2  Available on [employer] Teams

## 2022-09-30 NOTE — CONSULT NOTE ADULT - SUBJECTIVE AND OBJECTIVE BOX
Reason for consult: carcinoma     HPI:  Mr. Sharp is a 76 year old man with history of active smoking (recently cut back), COPD, lung cancer s/p QAMAR lobectomy (2019, [facility], no chemo/XRT), ?h/o DVT (off a/c since 2019, ?IVC filter placed in 2019), HTN, PVD/AAA s/p repair (2017) presenting with SOB/cough.              PAST MEDICAL & SURGICAL HISTORY:  HTN (hypertension)      GERD (gastroesophageal reflux disease)      PVD (peripheral vascular disease)      AAA (abdominal aortic aneurysm)  s/p repair in 12/2017      Smoker      Endoleak post (EVAR) endovascular aneurysm repair  12/2017      S/P ORIF (open reduction internal fixation) fracture  Right ankle, distant past      S/P cataract surgery  bilateral          FAMILY HISTORY:  FH: lung disease (Child)        Alochol: Denied  Smoking: Nonsmoker  Drug Use: Denied  Marital Status:         Allergies    No Known Allergies    Intolerances        MEDICATIONS  (STANDING):  albuterol/ipratropium for Nebulization 3 milliLiter(s) Nebulizer every 6 hours  aspirin enteric coated 81 milliGRAM(s) Oral daily  atorvastatin 20 milliGRAM(s) Oral at bedtime  budesonide 160 MICROgram(s)/formoterol 4.5 MICROgram(s) Inhaler 2 Puff(s) Inhalation two times a day  furosemide   Injectable 20 milliGRAM(s) IV Push daily  heparin   Injectable 5000 Unit(s) SubCutaneous every 8 hours  influenza  Vaccine (HIGH DOSE) 0.7 milliLiter(s) IntraMuscular once  metoprolol tartrate 25 milliGRAM(s) Oral two times a day  nicotine -   7 mG/24Hr(s) Patch 1 Patch Transdermal daily  NIFEdipine XL 60 milliGRAM(s) Oral daily  pantoprazole    Tablet 40 milliGRAM(s) Oral before breakfast  polyethylene glycol 3350 17 Gram(s) Oral daily  senna 1 Tablet(s) Oral two times a day  sodium chloride 3%  Inhalation 4 milliLiter(s) Inhalation every 6 hours    MEDICATIONS  (PRN):  acetaminophen     Tablet .. 650 milliGRAM(s) Oral every 6 hours PRN Temp greater or equal to 38C (100.4F), Mild Pain (1 - 3)  aluminum hydroxide/magnesium hydroxide/simethicone Suspension 30 milliLiter(s) Oral every 4 hours PRN Dyspepsia  benzonatate 100 milliGRAM(s) Oral every 8 hours PRN Cough  bisacodyl 5 milliGRAM(s) Oral every 12 hours PRN Constipation  guaifenesin/dextromethorphan Oral Liquid 10 milliLiter(s) Oral every 4 hours PRN Cough  melatonin 3 milliGRAM(s) Oral at bedtime PRN Insomnia  nicotine  Polacrilex Lozenge 2 milliGRAM(s) Oral every 2 hours PRN Breakthrough cravings      ROS:     General: + fatigue weight loss   Eyes:  Good vision, no reported pain  ENT:  No sore throat, pain, runny nose, dysphagia  CV:  No pain, palpitations, hypo/hypertension  Resp: + cough           PHYSICAL EXAM:     GENERAL:  Appears lethargic   CHEST: b/l crackles   HEART:  s1s2+   ABDOMEN:  Soft,  NEURO:  minimally alert           09-30    133<L>  |  89<L>  |  15  ----------------------------<  109<H>  5.3   |  32<H>  |  0.90    Ca    9.2      30 Sep 2022 07:12  Phos  3.8     09-30  Mg     2.20     09-30

## 2022-09-30 NOTE — PROGRESS NOTE ADULT - SUBJECTIVE AND OBJECTIVE BOX
SUBJECTIVE / OVERNIGHT EVENTS:pt seen and examined  22     MEDICATIONS  (STANDING):  albuterol/ipratropium for Nebulization 3 milliLiter(s) Nebulizer every 6 hours  aspirin enteric coated 81 milliGRAM(s) Oral daily  atorvastatin 20 milliGRAM(s) Oral at bedtime  budesonide 160 MICROgram(s)/formoterol 4.5 MICROgram(s) Inhaler 2 Puff(s) Inhalation two times a day  furosemide   Injectable 20 milliGRAM(s) IV Push daily  heparin   Injectable 5000 Unit(s) SubCutaneous every 8 hours  influenza  Vaccine (HIGH DOSE) 0.7 milliLiter(s) IntraMuscular once  metoprolol tartrate 25 milliGRAM(s) Oral two times a day  nicotine -   7 mG/24Hr(s) Patch 1 Patch Transdermal daily  NIFEdipine XL 60 milliGRAM(s) Oral daily  pantoprazole    Tablet 40 milliGRAM(s) Oral before breakfast  polyethylene glycol 3350 17 Gram(s) Oral daily  senna 1 Tablet(s) Oral two times a day  sodium chloride 3%  Inhalation 4 milliLiter(s) Inhalation every 6 hours    MEDICATIONS  (PRN):  acetaminophen     Tablet .. 650 milliGRAM(s) Oral every 6 hours PRN Temp greater or equal to 38C (100.4F), Mild Pain (1 - 3)  aluminum hydroxide/magnesium hydroxide/simethicone Suspension 30 milliLiter(s) Oral every 4 hours PRN Dyspepsia  benzonatate 100 milliGRAM(s) Oral every 8 hours PRN Cough  bisacodyl 5 milliGRAM(s) Oral every 12 hours PRN Constipation  guaifenesin/dextromethorphan Oral Liquid 10 milliLiter(s) Oral every 4 hours PRN Cough  melatonin 3 milliGRAM(s) Oral at bedtime PRN Insomnia  nicotine  Polacrilex Lozenge 2 milliGRAM(s) Oral every 2 hours PRN Breakthrough cravings    Vital Signs Last 24 Hrs  T(C): 36.9 (22 @ 21:17), Max: 36.9 (22 @ 21:17)  T(F): 98.5 (22 @ 21:17), Max: 98.5 (22 @ 21:17)  HR: 88 (22 @ 21:17) (73 - 88)  BP: 121/76 (22 @ 21:17) (121/76 - 128/78)  BP(mean): --  RR: 19 (22 @ 21:17) (18 - 19)  SpO2: 88% (22 @ 21:17) (85% - 95%)            Constitutional: No fever, fatigue  Skin: No rash.  Eyes: No recent vision problems or eye pain.  ENT: No congestion, ear pain, or sore throat.  Cardiovascular: No chest pain or palpation.  Respiratory: No cough, shortness of breath, congestion, or wheezing.  Gastrointestinal: No abdominal pain, nausea, vomiting, or diarrhea.  Genitourinary: No dysuria.  Musculoskeletal: No joint swelling.  Neurologic: No headache.    PHYSICAL EXAM:  GENERAL: NAD  EYES: EOMI, PERRLA  NECK: Supple, No JVD  CHEST/LUNG: dec breath sounds at bases  HEART:  S1 , S2 +  ABDOMEN: soft , bs+  EXTREMITIES:  edema+  NEUROLOGY:alert awake     LABS:      133<L>  |  89<L>  |  15  ----------------------------<  109<H>  5.3   |  32<H>  |  0.90    Ca    9.2      30 Sep 2022 07:12  Phos  3.8       Mg     2.20           Creatinine Trend: 0.90 <--, 0.90 <--, 0.94 <--, 1.29 <--, 1.34 <--, 1.44 <--, 1.59 <--, 1.72 <--, 1.74 <--, 1.86 <--    Urine Studies:  Urinalysis Basic - ( 28 Sep 2022 06:22 )    Color: Yellow / Appearance: Clear / S.013 / pH:   Gluc:  / Ketone: Negative  / Bili: Negative / Urobili: <2 mg/dL   Blood:  / Protein: Trace / Nitrite: Negative   Leuk Esterase: Negative / RBC:  / WBC    Sq Epi:  / Non Sq Epi:  / Bacteria:                                 Color: Yellow / Appearance: Clear / S.017 / pH:   Gluc:  / Ketone: Negative  / Bili: Negative / Urobili: <2 mg/dL   Blood:  / Protein: Trace / Nitrite: Negative   Leuk Esterase: Negative / RBC:  / WBC    Sq Epi:  / Non Sq Epi:  / Bacteria:       Osmolality, Random Urine: 220 mosm/kg ( @ 12:00)  Sodium, Random Urine: <20 mmol/L ( @ 12:00)                                        Imaging Personally Reviewed:    Consultant(s) Notes Reviewed: yes     Care Discussed with Consultants/Other Providers:yes

## 2022-09-30 NOTE — CONSULT NOTE ADULT - ASSESSMENT
Mr. Sharp is a 76 year old man with history of active smoking (recently cut back), COPD, lung cancer s/p QAMAR lobectomy (2019, [facility], no chemo/XRT), ?h/o DVT (off a/c since 2019, ?IVC filter placed in 2019), HTN, PVD/AAA s/p repair (2017) presenting with SOB/cough.      History of lung cancer   - pt diagnosed with lung carcinoma in 2019 with resection refused RT and chemo   - now with worsening sob   - pleural effusion positive for malignant cells adenocarcinoma   - also has liver mass   - Pt and family have made decision to undergo comfort care. pt does not want to any therapy. Pt HCP daughter and family agree   - at this point pt PS is very poor likely due to underlying COPD and malignancy   - pall care to be consulted   - DNR DNI       d/w medicine team     Gil Russell MD  HematologyOncology   O: [phone]

## 2022-09-30 NOTE — PROGRESS NOTE ADULT - SUBJECTIVE AND OBJECTIVE BOX
[facility] NEPHROLOGY- PROGRESS NOTE    76y Male with history of HTN, COPD presents with SOB. Nephrology consulted for elevated Scr and hyponatremia.       REVIEW OF SYSTEMS:  Gen: no changes in weight  Cards: no chest pain  Resp: + dyspnea improving  GI: no nausea or vomiting or diarrhea  Vascular: no LE edema    No Known Allergies      Hospital Medications: Medications reviewed      VITALS:  T(F): 97.5 (22 @ 05:30), Max: 98.2 (22 @ 18:00)  HR: 82 (22 @ 09:59)  BP: 122/81 (22 @ 05:30)  RR: 18 (22 @ 05:30)  SpO2: 95% (22 @ 09:59)  Wt(kg): --     @ 07:01  -   @ 11:25  --------------------------------------------------------  IN: 0 mL / OUT: 200 mL / NET: -200 mL        PHYSICAL EXAM:    Gen: NAD, calm  Cards: RRR, +S1/S2, no M/G/R  Resp: course BS B/L, Decreased BS @ R base  GI: soft, NT/ND, NABS  Vascular: no LE edema B/L with skin wrinkling        LABS:      133<L>  |  89<L>  |  15  ----------------------------<  109<H>  5.3   |  32<H>  |  0.90    Ca    9.2      30 Sep 2022 07:12  Phos  3.8       Mg     2.20           Creatinine Trend: 0.90 <--, 0.90 <--, 0.94 <--, 1.29 <--, 1.34 <--, 1.44 <--, 1.59 <--, 1.72 <--, 1.74 <--, 1.86 <--    Urine Studies:  Urinalysis Basic - ( 28 Sep 2022 06:22 )    Color: Yellow / Appearance: Clear / S.013 / pH:   Gluc:  / Ketone: Negative  / Bili: Negative / Urobili: <2 mg/dL   Blood:  / Protein: Trace / Nitrite: Negative   Leuk Esterase: Negative / RBC:  / WBC    Sq Epi:  / Non Sq Epi:  / Bacteria:

## 2022-09-30 NOTE — PROGRESS NOTE ADULT - ASSESSMENT
76 year old male with history of active smoking 3weeks ago quit, COPD not on controller, lung cancer s/p QAMAR lobectomy (2019, [facility], no chemo/XRT), h/o DVT (off a/c since 2019 with IVC filter placed in 2019), HTN, PVD/AAA s/p repair (2017) presenting with SOB/cough and found to have large right pleural effusion and hepatic mass now positive for metastatic adenocarcinoma     #large right pleural effusion   #hypoxemia  #hypercapnia  #COPD exacerbation  #PNA    recommendations  s/p diagnostic and therapeutic 2.1 L thoracentesis positive for metastatic adenoca  already discussed with daughter who is HCP: would not want to pursue aggressive chemo therapy   given increased respiratory distress will thora today 1-1.5 L and plan for pleurx on tentaviely on Tuesday after hearing Heme/onc input  continue aggressive chest pt and airway clearance with hypertonic saline, and acapella, incentive spirometry  COPD: continue duoneb q6, prednisone 40 for 5 days total, please start symbicort, once duoneb made PRN after improvement then start atrovent  PNA: s/p ceftriaxone/ azithroymcin. sputum culture normal resp jaciel, MRSA nasal swab negative, urine legionella negative  ECHO: mild diastolic dysfxn, mild TR  f/u CT chest non con: moderate loculated right PLEFF with partial atelectasis and consolidative opacities and hepatic mass      Mat Dacosta MD  [facility] PGY4  [facility] [address], [facility] [phone]

## 2022-09-30 NOTE — CHART NOTE - NSCHARTNOTEFT_GEN_A_CORE
discussed prognosis, goals of care with patient and daughter who is HCP at bedside. at this time no longer pursuing any further invasive interventions including thoracentesis or pleurx or any cancer treatment and would like to be full comfort care. can use oxygen as comfort, can use low dose morphine and ativan to control dyspnea. and glycopyrollate for secretions. consider palliaitve vs hospice referral. updated MOLST with primary team and updated attending.  no further pulm interventions    Mat Dacosta MD  [facility] PGY4  [facility] [address], [facility] [phone]

## 2022-09-30 NOTE — CHART NOTE - NSCHARTNOTEFT_GEN_A_CORE
Chest XR: Increasing right pleural effusion now with complete whiteout of the right hemithorax.    Examined and evaluated patient at bedside. Spoke to pulmonary Dr. Dacosta regarding CXR results. Recc to start acapella, chest percussion therapy, hyper-sal with duoneb to facilitate secretions. Will update attending Dr. Delgado.

## 2022-09-30 NOTE — PROGRESS NOTE ADULT - SUBJECTIVE AND OBJECTIVE BOX
CHIEF COMPLAINT:Patient is a 76y old  Male who presents with a chief complaint of Shortness of breath and cough (30 Sep 2022 11:24)      Interval Events: continues to have worsening oxygenation status. increased mucus and cough. Denies fevers, chills, chest pain, nausea, abdominal pain, no increased leg edema    REVIEW OF SYSTEMS:  [x] All other systems negative except per HPI   [ ] Unable to assess ROS because ________    OBJECTIVE:  ICU Vital Signs Last 24 Hrs  T(C): 36.2 (30 Sep 2022 10:00), Max: 36.8 (29 Sep 2022 18:00)  T(F): 97.2 (30 Sep 2022 10:00), Max: 98.2 (29 Sep 2022 18:00)  HR: 75 (30 Sep 2022 10:00) (71 - 83)  BP: 128/78 (30 Sep 2022 10:00) (108/72 - 130/82)  BP(mean): --  ABP: --  ABP(mean): --  RR: 18 (30 Sep 2022 10:00) (18 - 19)  SpO2: 94% (30 Sep 2022 10:00) (85% - 98%)    O2 Parameters below as of 30 Sep 2022 10:00  Patient On (Oxygen Delivery Method): nasal cannula  O2 Flow (L/min): 6        Mode: standby    09-30 @ 07:01  -  09-30 @ 13:21  --------------------------------------------------------  IN: 0 mL / OUT: 200 mL / NET: -200 mL        PHYSICAL EXAM:  GENERAL: NAD, well-groomed, well-developed  HEAD:  Atraumatic, Normocephalic  EYES: EOMI, PERRLA, conjunctiva and sclera clear  ENMT: No tonsillar erythema, exudates, or enlargement; Moist mucous membranes, Good dentition, No lesions  NECK: Supple, No JVD, Normal thyroid  CHEST/LUNG: decreased right sided breath sounds ; coarse rhonci  HEART: Regular rate and rhythm; No murmurs, rubs, or gallops  ABDOMEN: Soft, Nontender, Nondistended; Bowel sounds present  VASCULAR:  2+ Peripheral Pulses, No clubbing, cyanosis, or edema  LYMPH: No lymphadenopathy noted  SKIN: No rashes or lesions  NERVOUS SYSTEM:  Alert & Oriented X3, Good concentration;   HOSPITAL MEDICATIONS:  MEDICATIONS  (STANDING):  albuterol/ipratropium for Nebulization 3 milliLiter(s) Nebulizer every 6 hours  aspirin enteric coated 81 milliGRAM(s) Oral daily  atorvastatin 20 milliGRAM(s) Oral at bedtime  budesonide 160 MICROgram(s)/formoterol 4.5 MICROgram(s) Inhaler 2 Puff(s) Inhalation two times a day  furosemide   Injectable 20 milliGRAM(s) IV Push daily  heparin   Injectable 5000 Unit(s) SubCutaneous every 8 hours  influenza  Vaccine (HIGH DOSE) 0.7 milliLiter(s) IntraMuscular once  metoprolol tartrate 25 milliGRAM(s) Oral two times a day  nicotine -   7 mG/24Hr(s) Patch 1 Patch Transdermal daily  NIFEdipine XL 60 milliGRAM(s) Oral daily  pantoprazole    Tablet 40 milliGRAM(s) Oral before breakfast  polyethylene glycol 3350 17 Gram(s) Oral daily  senna 1 Tablet(s) Oral two times a day  sodium chloride 3%  Inhalation 4 milliLiter(s) Inhalation every 6 hours  sodium chloride 7% Inhalation 4 milliLiter(s) Inhalation every 6 hours    MEDICATIONS  (PRN):  acetaminophen     Tablet .. 650 milliGRAM(s) Oral every 6 hours PRN Temp greater or equal to 38C (100.4F), Mild Pain (1 - 3)  aluminum hydroxide/magnesium hydroxide/simethicone Suspension 30 milliLiter(s) Oral every 4 hours PRN Dyspepsia  benzonatate 100 milliGRAM(s) Oral every 8 hours PRN Cough  bisacodyl 5 milliGRAM(s) Oral every 12 hours PRN Constipation  guaifenesin/dextromethorphan Oral Liquid 10 milliLiter(s) Oral every 4 hours PRN Cough  melatonin 3 milliGRAM(s) Oral at bedtime PRN Insomnia  nicotine  Polacrilex Lozenge 2 milliGRAM(s) Oral every 2 hours PRN Breakthrough cravings      LABS:    The Labs were reviewed by me   The Radiology was reviewed by me    EKG tracing reviewed by me    09-30    133<L>  |  89<L>  |  15  ----------------------------<  109<H>  5.3   |  32<H>  |  0.90  09-28    137  |  94<L>  |  15  ----------------------------<  104<H>  4.4   |  32<H>  |  0.90    Ca    9.2      30 Sep 2022 07:12  Ca    8.8      28 Sep 2022 06:22  Phos  3.8     09-30  Mg     2.20     09-30      Magnesium, Serum: 2.20 mg/dL (09-30-22 @ 07:12)  Magnesium, Serum: 2.40 mg/dL (09-28-22 @ 06:22)    Phosphorus Level, Serum: 3.8 mg/dL (09-30-22 @ 07:12)  Phosphorus Level, Serum: 2.5 mg/dL (09-28-22 @ 06:22)                                              11.6   15.62 )-----------( 130      ( 28 Sep 2022 06:22 )             38.8     CAPILLARY BLOOD GLUCOSE            MICROBIOLOGY:     RADIOLOGY:  [ ] Reviewed and interpreted by me    Point of Care Ultrasound Findings:    PFT:    EKG:

## 2022-09-30 NOTE — PROGRESS NOTE ADULT - ASSESSMENT
76y Male with history of HTN, COPD presents with SOB. Nephrology consulted for elevated Scr and hyponatremia.    1. ALFREDO: In setting of poor solute intake likely due to pre-renal azotemia. ALFREDO resolved. UA bland. Urine sodium low. Defer renal imaging at this time. No renal objection to CT with IV contrast as patient low risk for SAIRA. Avoid nephrotoxins. Monitor electrolytes.    2. Hyponatremia: Due to poor solute intake given low normal urine osm. Hyponatremia resolved s/p IVF with mild decrease today in setting of hemolyzed sample and therefore not accurate. Monitor serum Na.    3. Hyperkalemia: Resolved. Sample this morning hemolyzed. Monitor serum potassium.    4. HTN: BP controlled. Continue with current medications. Monitor BP.      [facility]  Marbin Saravia M.D.  Jeremy Ortiz D.O.  Cheri Gardner M.D.  Camila Montiel, BHARTI, ANP-C    Telephone: [phone]  Facsimile: [phone]    [address] [id number].

## 2022-09-30 NOTE — CONSULT NOTE ADULT - SUBJECTIVE AND OBJECTIVE BOX
[facility] DEPARTMENT OF OPHTHALMOLOGY - INITIAL ADULT CONSULT  -----------------------------------------------------------------------------------------------------------------  Ryder Salgado MD PGY 2  Contact via [employer] Teams  -----------------------------------------------------------------------------------------------------------------    HPI: Per primary team  Mr. Sharp is a 76 year old man with history of active smoking (recently cut back), COPD, lung cancer s/p QAMAR lobectomy (2019, [facility], no chemo/XRT), ?h/o DVT (off a/c since 2019, ?IVC filter placed in 2019), HTN, PVD/AAA s/p repair (2017) presenting with SOB/cough.    Patient only able to provide limited information given that he was using BiPAP during encounter, and deferred many questions to his daughter, Laurita, who is a med-surg RN. Pt and daughter report that symptoms started ~10 days ago and have predominantly included worsening SOB and cough productive of white sputum. SOB is severe, constant, and worse with exertion. Denies fever/chills, myalgias, changes in weight, URI symptoms, chest pain, hemoptysis, syncope. He describes poor PO intake over this time; he has constipation and denies any diarrhea. He was seen as an outpatient and prescribed levofloxacin x10 days, medrol dose pack, and albuterol inhaler on 9/13; however, these interventions did not seem to help and his symptoms continued to worsen so he presented to the ED fur further evaluation and management.     In the ED found to have O2 sat 74% on RA --> 94% on 3L, VBG 7.19/75, troponin 1394, EKG chronic RBBB, Na 119, Cr 2.29, CXR large R pleural effusion.  Received ceftriaxone/azithromycin, solumedrol 125, duonebs, lasix 40 IV.  Cardiology consulted for elevated troponin, felt most likely 2/2 demand  MICU consulted for respiratory failure - recommended BiPAP 10/5, continue to trend VBG, pulm consult  Pulm consulted for pleural effusion and will evaluate the patient (22 Sep 2022 10:20)    Found to have malignant pleural effusion as well as 7cm hepatic mass. Patient family made decision for comfort care.     Interval History: Ophthalmology consulted to evaluate blurry vision. Patient denies blurry vision at the bedside. Pt states he sees "two black dots". States the black dot becomes one when he closes his left eye and the dot disappears when he closes his right eye. States the black dot moves. It has been going on for 1 week. Not worsening. Denies any blacking out or greying of vision Denies any double vision. Denies any eye pain. Denies any flashes or curtains.     PAST MEDICAL & SURGICAL HISTORY:  HTN (hypertension)      GERD (gastroesophageal reflux disease)      PVD (peripheral vascular disease)      AAA (abdominal aortic aneurysm)  s/p repair in 12/2017      Smoker      Endoleak post (EVAR) endovascular aneurysm repair  12/2017      S/P ORIF (open reduction internal fixation) fracture  Right ankle, distant past      S/P cataract surgery  bilateral        Past Ocular History: PCIOL OU    Family History:  FAMILY HISTORY:  FH: lung disease (Child)        Social History: former smoker.     Ophthalmic Medications: None    MEDICATIONS  (STANDING):  albuterol/ipratropium for Nebulization 3 milliLiter(s) Nebulizer every 6 hours  aspirin enteric coated 81 milliGRAM(s) Oral daily  atorvastatin 20 milliGRAM(s) Oral at bedtime  budesonide 160 MICROgram(s)/formoterol 4.5 MICROgram(s) Inhaler 2 Puff(s) Inhalation two times a day  furosemide   Injectable 20 milliGRAM(s) IV Push daily  heparin   Injectable 5000 Unit(s) SubCutaneous every 8 hours  influenza  Vaccine (HIGH DOSE) 0.7 milliLiter(s) IntraMuscular once  metoprolol tartrate 25 milliGRAM(s) Oral two times a day  nicotine -   7 mG/24Hr(s) Patch 1 Patch Transdermal daily  NIFEdipine XL 60 milliGRAM(s) Oral daily  pantoprazole    Tablet 40 milliGRAM(s) Oral before breakfast  polyethylene glycol 3350 17 Gram(s) Oral daily  senna 1 Tablet(s) Oral two times a day  sodium chloride 3%  Inhalation 4 milliLiter(s) Inhalation every 6 hours    MEDICATIONS  (PRN):  acetaminophen     Tablet .. 650 milliGRAM(s) Oral every 6 hours PRN Temp greater or equal to 38C (100.4F), Mild Pain (1 - 3)  aluminum hydroxide/magnesium hydroxide/simethicone Suspension 30 milliLiter(s) Oral every 4 hours PRN Dyspepsia  benzonatate 100 milliGRAM(s) Oral every 8 hours PRN Cough  bisacodyl 5 milliGRAM(s) Oral every 12 hours PRN Constipation  guaifenesin/dextromethorphan Oral Liquid 10 milliLiter(s) Oral every 4 hours PRN Cough  melatonin 3 milliGRAM(s) Oral at bedtime PRN Insomnia  nicotine  Polacrilex Lozenge 2 milliGRAM(s) Oral every 2 hours PRN Breakthrough cravings    Allergies & Intolerances:     Review of Systems:  Constitutional: No fever, chills  Eyes: No blurry vision, flashes, FBS, erythema, discharge, double vision, OU  Neuro: No tremors  Cardiovascular: No chest pain, palpitations  Respiratory: No SOB, no cough  GI: No nausea, vomiting, abdominal pain  : No dysuria  Skin: no rash  Psych: no depression  Endocrine: no polyuria, polydipsia  Heme/lymph: no swelling    VITALS: T(C): 36.2 (09-30-22 @ 10:00)  T(F): 97.2 (09-30-22 @ 10:00), Max: 97.9 (09-29-22 @ 22:10)  HR: 75 (09-30-22 @ 10:00) (71 - 83)  BP: 128/78 (09-30-22 @ 10:00) (108/72 - 128/78)  RR:  (18 - 19)  SpO2:  (85% - 98%)  Wt(kg): --  General: AAO x 3, appropriate mood and affect    Ophthalmology Exam:  Visual acuity (CC): 20/40 PH 20/30+2 OD, 20/25 OS  Pupils: PERRL OU, no APD  Tonometry:  13.14  Extraocular movements (EOMs): Full OU, no pain, no diplopia.  Confrontational Visual Field (CVF): Full OU.  Color Plates: 11/12 OU.    Pen Light Exam (PLE)  External: Normal OU.  Lids/Lashes/Lacrimal Ducts: Flat OD. Papilloma at left lower lid margin. Scurf OU.   Sclera/Conjunctiva: White and quiet OU.  Cornea: Clear OU.  Anterior Chamber: Deep and formed OU.    Iris: Flat OU.  Lens: PCIOL OU.    Fundus Exam: dilated with 1% tropicamide and 2.5% phenylephrine  Approval obtained from primary team for dilation  Patient aware that pupils can remained dilated for at least 4-6 hours.  Exam performed with 20 D lens    Vitreous: wnl OU  Disc, cup/disc: sharp and pink, 0.4 OU  Macula: wnl OU  Vessels: wnl OU  Periphery: wnl OU   [facility] DEPARTMENT OF OPHTHALMOLOGY - INITIAL ADULT CONSULT  -----------------------------------------------------------------------------------------------------------------  Ryder Salgado MD PGY 2  Contact via [employer] Teams  -----------------------------------------------------------------------------------------------------------------    HPI: Per primary team  Mr. Sharp is a 76 year old man with history of active smoking (recently cut back), COPD, lung cancer s/p QAMAR lobectomy (2019, [facility], no chemo/XRT), ?h/o DVT (off a/c since 2019, ?IVC filter placed in 2019), HTN, PVD/AAA s/p repair (2017) presenting with SOB/cough.    Patient only able to provide limited information given that he was using BiPAP during encounter, and deferred many questions to his daughter, Laurita, who is a med-surg RN. Pt and daughter report that symptoms started ~10 days ago and have predominantly included worsening SOB and cough productive of white sputum. SOB is severe, constant, and worse with exertion. Denies fever/chills, myalgias, changes in weight, URI symptoms, chest pain, hemoptysis, syncope. He describes poor PO intake over this time; he has constipation and denies any diarrhea. He was seen as an outpatient and prescribed levofloxacin x10 days, medrol dose pack, and albuterol inhaler on 9/13; however, these interventions did not seem to help and his symptoms continued to worsen so he presented to the ED fur further evaluation and management.     In the ED found to have O2 sat 74% on RA --> 94% on 3L, VBG 7.19/75, troponin 1394, EKG chronic RBBB, Na 119, Cr 2.29, CXR large R pleural effusion.  Received ceftriaxone/azithromycin, solumedrol 125, duonebs, lasix 40 IV.  Cardiology consulted for elevated troponin, felt most likely 2/2 demand  MICU consulted for respiratory failure - recommended BiPAP 10/5, continue to trend VBG, pulm consult  Pulm consulted for pleural effusion and will evaluate the patient (22 Sep 2022 10:20)    Found to have malignant pleural effusion as well as 7cm hepatic mass. Patient family made decision for comfort care.     Interval History: Ophthalmology consulted to evaluate blurry vision. Patient denies blurry vision at the bedside. Pt states he sees "two black dots". States the black dot becomes one when he closes his left eye and the dot disappears when he closes his right eye. States the black dot moves. It has been going on for 1 week. Not worsening. Denies any blacking out or greying of vision Denies any double vision. Denies any eye pain. Denies any flashes or curtains.     PAST MEDICAL & SURGICAL HISTORY:  HTN (hypertension)      GERD (gastroesophageal reflux disease)      PVD (peripheral vascular disease)      AAA (abdominal aortic aneurysm)  s/p repair in 12/2017      Smoker      Endoleak post (EVAR) endovascular aneurysm repair  12/2017      S/P ORIF (open reduction internal fixation) fracture  Right ankle, distant past      S/P cataract surgery  bilateral        Past Ocular History: PCIOL OU    Family History:  FAMILY HISTORY:  FH: lung disease (Child)        Social History: former smoker.     Ophthalmic Medications: None    MEDICATIONS  (STANDING):  albuterol/ipratropium for Nebulization 3 milliLiter(s) Nebulizer every 6 hours  aspirin enteric coated 81 milliGRAM(s) Oral daily  atorvastatin 20 milliGRAM(s) Oral at bedtime  budesonide 160 MICROgram(s)/formoterol 4.5 MICROgram(s) Inhaler 2 Puff(s) Inhalation two times a day  furosemide   Injectable 20 milliGRAM(s) IV Push daily  heparin   Injectable 5000 Unit(s) SubCutaneous every 8 hours  influenza  Vaccine (HIGH DOSE) 0.7 milliLiter(s) IntraMuscular once  metoprolol tartrate 25 milliGRAM(s) Oral two times a day  nicotine -   7 mG/24Hr(s) Patch 1 Patch Transdermal daily  NIFEdipine XL 60 milliGRAM(s) Oral daily  pantoprazole    Tablet 40 milliGRAM(s) Oral before breakfast  polyethylene glycol 3350 17 Gram(s) Oral daily  senna 1 Tablet(s) Oral two times a day  sodium chloride 3%  Inhalation 4 milliLiter(s) Inhalation every 6 hours    MEDICATIONS  (PRN):  acetaminophen     Tablet .. 650 milliGRAM(s) Oral every 6 hours PRN Temp greater or equal to 38C (100.4F), Mild Pain (1 - 3)  aluminum hydroxide/magnesium hydroxide/simethicone Suspension 30 milliLiter(s) Oral every 4 hours PRN Dyspepsia  benzonatate 100 milliGRAM(s) Oral every 8 hours PRN Cough  bisacodyl 5 milliGRAM(s) Oral every 12 hours PRN Constipation  guaifenesin/dextromethorphan Oral Liquid 10 milliLiter(s) Oral every 4 hours PRN Cough  melatonin 3 milliGRAM(s) Oral at bedtime PRN Insomnia  nicotine  Polacrilex Lozenge 2 milliGRAM(s) Oral every 2 hours PRN Breakthrough cravings    Allergies & Intolerances:     Review of Systems:  Constitutional: No fever, chills  Eyes: No blurry vision, flashes, FBS, erythema, discharge, double vision, OU  Neuro: No tremors  Cardiovascular: No chest pain, palpitations  Respiratory: No SOB, no cough  GI: No nausea, vomiting, abdominal pain  : No dysuria  Skin: no rash  Psych: no depression  Endocrine: no polyuria, polydipsia  Heme/lymph: no swelling    VITALS: T(C): 36.2 (09-30-22 @ 10:00)  T(F): 97.2 (09-30-22 @ 10:00), Max: 97.9 (09-29-22 @ 22:10)  HR: 75 (09-30-22 @ 10:00) (71 - 83)  BP: 128/78 (09-30-22 @ 10:00) (108/72 - 128/78)  RR:  (18 - 19)  SpO2:  (85% - 98%)  Wt(kg): --  General: AAO x 3, appropriate mood and affect    Ophthalmology Exam:  Visual acuity (CC): 20/40 PH 20/30+2 OD, 20/25 OS  Pupils: PERRL OU, no APD  Tonometry:  13.14  Extraocular movements (EOMs): Full OU, no pain, no diplopia.  Confrontational Visual Field (CVF): Full OU.  Color Plates: 11/12 OU.    Pen Light Exam (PLE)  External: Normal OU.  Lids/Lashes/Lacrimal Ducts: Aponeurotic ptosis OU. Lids Flat OD. Round, elevated nevus on left lower lid margin. Scurf OU.   Sclera/Conjunctiva: White and quiet OU.  Cornea: Tr-1+ SPK OU.  Anterior Chamber: Deep and formed OU.    Iris: Flat OU.  Lens: PCIOL OU.    Fundus Exam: dilated with 1% tropicamide and 2.5% phenylephrine  Approval obtained from primary team for dilation  Patient aware that pupils can remained dilated for at least 4-6 hours.  Exam performed with 20 D lens    Vitreous: wnl OU  Disc, cup/disc: sharp and pink, 0.4 OU  Macula: wnl OU  Vessels: wnl OU  Periphery: wnl OD. Outside of the arcades there is dispersion of pigment (black) and retinal atrophy from 7 o clock to 1 o clock.    [facility] DEPARTMENT OF OPHTHALMOLOGY - INITIAL ADULT CONSULT  ------------------------------------------------------------------------------------------------------  Ryder Salgado MD PGY 2  Contact via [employer] Teams  -------------------------------------------------------------------------------------------------------  HPI: Per primary team  Mr. Sharp is a 76 year old man with history of active smoking (recently cut back), COPD, lung cancer s/p QAMAR lobectomy (2019, [facility], no chemo/XRT), ?h/o DVT (off a/c since 2019, ?IVC filter placed in 2019), HTN, PVD/AAA s/p repair (2017) presenting with SOB/cough.    Patient only able to provide limited information given that he was using BiPAP during encounter, and deferred many questions to his daughter, Laurita, who is a med-surg RN. Pt and daughter report that symptoms started ~10 days ago and have predominantly included worsening SOB and cough productive of white sputum. SOB is severe, constant, and worse with exertion. Denies fever/chills, myalgias, changes in weight, URI symptoms, chest pain, hemoptysis, syncope. He describes poor PO intake over this time; he has constipation and denies any diarrhea. He was seen as an outpatient and prescribed levofloxacin x10 days, medrol dose pack, and albuterol inhaler on 9/13; however, these interventions did not seem to help and his symptoms continued to worsen so he presented to the ED fur further evaluation and management.     In the ED found to have O2 sat 74% on RA --> 94% on 3L, VBG 7.19/75, troponin 1394, EKG chronic RBBB, Na 119, Cr 2.29, CXR large R pleural effusion.  Received ceftriaxone/azithromycin, solumedrol 125, duonebs, lasix 40 IV.  Cardiology consulted for elevated troponin, felt most likely 2/2 demand  MICU consulted for respiratory failure - recommended BiPAP 10/5, continue to trend VBG, pulm consult  Pulm consulted for pleural effusion and will evaluate the patient (22 Sep 2022 10:20)    Found to have malignant pleural effusion as well as 7cm hepatic mass. Patient family made decision for comfort care.     Interval History: Ophthalmology consulted to evaluate blurry vision. Patient denies blurry vision at the bedside. Pt states he sees "two black dots". States the black dot becomes one when he closes his left eye and the dot disappears when he closes his right eye. States the black dot moves. It has been going on for 1 week. Not worsening. Denies any blacking out or greying of vision Denies any double vision. Denies any eye pain. Denies any flashes or curtains.     PAST MEDICAL & SURGICAL HISTORY:  HTN (hypertension)      GERD (gastroesophageal reflux disease)      PVD (peripheral vascular disease)      AAA (abdominal aortic aneurysm)  s/p repair in 12/2017      Smoker      Endoleak post (EVAR) endovascular aneurysm repair  12/2017      S/P ORIF (open reduction internal fixation) fracture  Right ankle, distant past      S/P cataract surgery  bilateral        Past Ocular History: PCIOL OU    Family History:  FAMILY HISTORY:  FH: lung disease (Child)        Social History: former smoker.     Ophthalmic Medications: None    MEDICATIONS  (STANDING):  albuterol/ipratropium for Nebulization 3 milliLiter(s) Nebulizer every 6 hours  aspirin enteric coated 81 milliGRAM(s) Oral daily  atorvastatin 20 milliGRAM(s) Oral at bedtime  budesonide 160 MICROgram(s)/formoterol 4.5 MICROgram(s) Inhaler 2 Puff(s) Inhalation two times a day  furosemide   Injectable 20 milliGRAM(s) IV Push daily  heparin   Injectable 5000 Unit(s) SubCutaneous every 8 hours  influenza  Vaccine (HIGH DOSE) 0.7 milliLiter(s) IntraMuscular once  metoprolol tartrate 25 milliGRAM(s) Oral two times a day  nicotine -   7 mG/24Hr(s) Patch 1 Patch Transdermal daily  NIFEdipine XL 60 milliGRAM(s) Oral daily  pantoprazole    Tablet 40 milliGRAM(s) Oral before breakfast  polyethylene glycol 3350 17 Gram(s) Oral daily  senna 1 Tablet(s) Oral two times a day  sodium chloride 3%  Inhalation 4 milliLiter(s) Inhalation every 6 hours    MEDICATIONS  (PRN):  acetaminophen     Tablet .. 650 milliGRAM(s) Oral every 6 hours PRN Temp greater or equal to 38C (100.4F), Mild Pain (1 - 3)  aluminum hydroxide/magnesium hydroxide/simethicone Suspension 30 milliLiter(s) Oral every 4 hours PRN Dyspepsia  benzonatate 100 milliGRAM(s) Oral every 8 hours PRN Cough  bisacodyl 5 milliGRAM(s) Oral every 12 hours PRN Constipation  guaifenesin/dextromethorphan Oral Liquid 10 milliLiter(s) Oral every 4 hours PRN Cough  melatonin 3 milliGRAM(s) Oral at bedtime PRN Insomnia  nicotine  Polacrilex Lozenge 2 milliGRAM(s) Oral every 2 hours PRN Breakthrough cravings    Allergies & Intolerances: NKDA    Review of Systems:  Constitutional: No fever, chills  Eyes: No blurry vision, flashes, FBS, erythema, discharge, double vision, OU  Neuro: No tremors  Cardiovascular: No chest pain, palpitations  Respiratory: No SOB, no cough  GI: No nausea, vomiting, abdominal pain  : No dysuria  Skin: no rash  Psych: no depression  Endocrine: no polyuria, polydipsia  Heme/lymph: no swelling    VITALS: T(C): 36.2 (09-30-22 @ 10:00)  T(F): 97.2 (09-30-22 @ 10:00), Max: 97.9 (09-29-22 @ 22:10)  HR: 75 (09-30-22 @ 10:00) (71 - 83)  BP: 128/78 (09-30-22 @ 10:00) (108/72 - 128/78)  RR:  (18 - 19)  SpO2:  (85% - 98%)  General: AAO x 3, appropriate mood and affect    Ophthalmology Exam:  Visual acuity (CC): 20/40 PH 20/30+2 OD, 20/25 OS  Pupils: PERRL OU, no APD  Tonometry:  OD 13 OS 14  Extraocular movements (EOMs): Full OU, no pain, no diplopia.  Confrontational Visual Field (CVF): Full OU.  Color Plates: OD 11/12 OS 11/12    Pen Light Exam (PLE)  External: Normal OU.  Lids/Lashes/Lacrimal Ducts: Lids Flat OD. Round, elevated nevus on left lower lid margin. Scurf OU.   Sclera/Conjunctiva: White and quiet OU.  Cornea: Tr-1+ SPK OU.  Anterior Chamber: Deep and formed OU.    Iris: Flat OU.  Lens: PCIOL OU.    Fundus Exam: dilated with 1% tropicamide and 2.5% phenylephrine  Approval obtained from primary team for dilation  Patient aware that pupils can remained dilated for at least 4-6 hours.  Exam performed with 20 D lens    Vitreous: PVD OD. wnl OS.   Disc, cup/disc: sharp and pink, 0.4 OU  Macula: wnl OU  Vessels: Attenuated vessels OU  Periphery: wnl OD. OS: Outside of the arcades there is dispersion of pigment (black) and retinal atrophy from 1 o clock to 7 o clock. No hemorrhages.    [facility] DEPARTMENT OF OPHTHALMOLOGY - INITIAL ADULT CONSULT  ------------------------------------------------------------------------------------------------------  Ryder Salgado MD PGY 2  Contact via [employer]  -------------------------------------------------------------------------------------------------------  HPI: Per primary team  Mr. Sharp is a 76 year old man with history of active smoking (recently cut back), COPD, lung cancer s/p QAMAR lobectomy (2019, [facility], no chemo/XRT), ?h/o DVT (off a/c since 2019, ?IVC filter placed in 2019), HTN, PVD/AAA s/p repair (2017) presenting with SOB/cough.    Patient only able to provide limited information given that he was using BiPAP during encounter, and deferred many questions to his daughter, Laurita, who is a med-surg RN. Pt and daughter report that symptoms started ~10 days ago and have predominantly included worsening SOB and cough productive of white sputum. SOB is severe, constant, and worse with exertion. Denies fever/chills, myalgias, changes in weight, URI symptoms, chest pain, hemoptysis, syncope. He describes poor PO intake over this time; he has constipation and denies any diarrhea. He was seen as an outpatient and prescribed levofloxacin x10 days, medrol dose pack, and albuterol inhaler on 9/13; however, these interventions did not seem to help and his symptoms continued to worsen so he presented to the ED fur further evaluation and management.     In the ED found to have O2 sat 74% on RA --> 94% on 3L, VBG 7.19/75, troponin 1394, EKG chronic RBBB, Na 119, Cr 2.29, CXR large R pleural effusion.  Received ceftriaxone/azithromycin, solumedrol 125, duonebs, lasix 40 IV.  Cardiology consulted for elevated troponin, felt most likely 2/2 demand  MICU consulted for respiratory failure - recommended BiPAP 10/5, continue to trend VBG, pulm consult  Pulm consulted for pleural effusion and will evaluate the patient (22 Sep 2022 10:20)    Found to have malignant pleural effusion as well as 7cm hepatic mass. Patient family made decision for comfort care.     Interval History: Ophthalmology consulted to evaluate blurry vision. Patient denies blurry vision at the bedside. Pt states he sees "two black dots". States the black dot becomes one when he closes his left eye and the dot disappears when he closes his right eye. States the black dot moves. It has been going on for 1 week. Not worsening. Denies any blacking out or greying of vision Denies any double vision. Denies any eye pain. Denies any flashes or curtains.     PAST MEDICAL & SURGICAL HISTORY:  HTN (hypertension)      GERD (gastroesophageal reflux disease)      PVD (peripheral vascular disease)      AAA (abdominal aortic aneurysm)  s/p repair in 12/2017      Smoker      Endoleak post (EVAR) endovascular aneurysm repair  12/2017      S/P ORIF (open reduction internal fixation) fracture  Right ankle, distant past      S/P cataract surgery  bilateral        Past Ocular History: PCIOL OU    Family History:  FAMILY HISTORY:  FH: lung disease (Child)        Social History: former smoker.     Ophthalmic Medications: None    MEDICATIONS  (STANDING):  albuterol/ipratropium for Nebulization 3 milliLiter(s) Nebulizer every 6 hours  aspirin enteric coated 81 milliGRAM(s) Oral daily  atorvastatin 20 milliGRAM(s) Oral at bedtime  budesonide 160 MICROgram(s)/formoterol 4.5 MICROgram(s) Inhaler 2 Puff(s) Inhalation two times a day  furosemide   Injectable 20 milliGRAM(s) IV Push daily  heparin   Injectable 5000 Unit(s) SubCutaneous every 8 hours  influenza  Vaccine (HIGH DOSE) 0.7 milliLiter(s) IntraMuscular once  metoprolol tartrate 25 milliGRAM(s) Oral two times a day  nicotine -   7 mG/24Hr(s) Patch 1 Patch Transdermal daily  NIFEdipine XL 60 milliGRAM(s) Oral daily  pantoprazole    Tablet 40 milliGRAM(s) Oral before breakfast  polyethylene glycol 3350 17 Gram(s) Oral daily  senna 1 Tablet(s) Oral two times a day  sodium chloride 3%  Inhalation 4 milliLiter(s) Inhalation every 6 hours    MEDICATIONS  (PRN):  acetaminophen     Tablet .. 650 milliGRAM(s) Oral every 6 hours PRN Temp greater or equal to 38C (100.4F), Mild Pain (1 - 3)  aluminum hydroxide/magnesium hydroxide/simethicone Suspension 30 milliLiter(s) Oral every 4 hours PRN Dyspepsia  benzonatate 100 milliGRAM(s) Oral every 8 hours PRN Cough  bisacodyl 5 milliGRAM(s) Oral every 12 hours PRN Constipation  guaifenesin/dextromethorphan Oral Liquid 10 milliLiter(s) Oral every 4 hours PRN Cough  melatonin 3 milliGRAM(s) Oral at bedtime PRN Insomnia  nicotine  Polacrilex Lozenge 2 milliGRAM(s) Oral every 2 hours PRN Breakthrough cravings    Allergies & Intolerances: NKDA    Review of Systems:  Constitutional: No fever, chills  Eyes: No blurry vision, flashes, FBS, erythema, discharge, double vision, OU  Neuro: No tremors  Cardiovascular: No chest pain, palpitations  Respiratory: No SOB, no cough  GI: No nausea, vomiting, abdominal pain  : No dysuria  Skin: no rash  Psych: no depression  Endocrine: no polyuria, polydipsia  Heme/lymph: no swelling    VITALS: T(C): 36.2 (09-30-22 @ 10:00)  T(F): 97.2 (09-30-22 @ 10:00), Max: 97.9 (09-29-22 @ 22:10)  HR: 75 (09-30-22 @ 10:00) (71 - 83)  BP: 128/78 (09-30-22 @ 10:00) (108/72 - 128/78)  RR:  (18 - 19)  SpO2:  (85% - 98%)  General: AAO x 3, appropriate mood and affect    Ophthalmology Exam:  Visual acuity (CC): 20/40 PH 20/30+2 OD, 20/25 OS  Pupils: PERRL OU, no APD  Tonometry:  OD 13 OS 14  Extraocular movements (EOMs): Full OU, no pain, no diplopia.  Confrontational Visual Field (CVF): Full OU.  Color Plates: OD 11/12 OS 11/12    Pen Light Exam (PLE) (Unable to tolerate slit lamp exam due to clinical status, not able to sit up)  External: Normal OU.  Lids/Lashes/Lacrimal Ducts: Floppy Eyelids OU. Lids Flat OD. Round, elevated nevus on left lower lid margin. Scurf OU.   Sclera/Conjunctiva: White and quiet OU.  Cornea: Tr-1+ SPK OU.  Anterior Chamber: Deep and formed OU.    Iris: Flat OU.  Lens: PCIOL OU.    Fundus Exam: dilated with 1% tropicamide and 2.5% phenylephrine  Approval obtained from primary team for dilation  Patient aware that pupils can remained dilated for at least 4-6 hours.  Exam performed with 20 D lens    Vitreous: PVD OD. wnl OS.   Disc, cup/disc: sharp and pink, 0.4 OU  Macula: wnl OU  Vessels: Attenuated vessels OU  Periphery: wnl OD. OS: Outside of the arcades there is dispersion of pigment (black) and retinal atrophy from 1 o clock to 7 o clock. No hemorrhages.

## 2022-09-30 NOTE — PROGRESS NOTE ADULT - ATTENDING COMMENTS
76 year old male with history of COPD, lung cancer s/p QAMAR lobectomy (2019), DVT, AAA s/p repair, PVD, and HTN here with shortness of breath, acute hypoxemic respiratory failure. Found to have large right malignant pleural effusion and hepatic mass. Underwent right thoracentesis with cytopathology positive for metastatic adenocarcinoma. Pleural fluid has now reaccumulated. O2 requirements are increasing and patient appears in more distress. Repeat thoracentesis offered however patient's daughter defers and would like to pursue comfort based approach.

## 2022-09-30 NOTE — PROGRESS NOTE ADULT - TIME BILLING
chart and data review, clinical assessment, coordination of care
review of laboratory data, radiology results, discussion with primary team\patient, and monitoring for potential decompensation. Interventions were performed as documented above

## 2022-10-01 NOTE — PROGRESS NOTE ADULT - ASSESSMENT
76y Male with history of HTN, COPD presents with SOB. Nephrology consulted for elevated Scr and hyponatremia.    1. ALFREDO: In setting of poor solute intake likely due to pre-renal azotemia. ALFREDO resolved. UA bland. Urine sodium low. Defer renal imaging at this time. Avoid nephrotoxins. Monitor electrolytes.    2. Hyponatremia: Due to poor solute intake given low normal urine osm. Hyponatremia resolved s/p IVF with mild decrease on 9/30 in setting of hemolyzed sample and therefore not accurate. Monitor serum Na.    3. Hyperkalemia: Resolved. Monitor serum potassium.    4. HTN: BP controlled. Continue with current medications. Monitor BP.    Patient now comfort care.      [facility] NEPHROLOGY  Marbin Saravia M.D.  Jeremy Ortiz D.O.  Cheri Gardner M.D.  Camila Montiel, MSN, ANP-C    Telephone: [phone]  Facsimile: [phone]    [address] [place] [address].

## 2022-10-01 NOTE — PROGRESS NOTE ADULT - SUBJECTIVE AND OBJECTIVE BOX
[facility] NEPHROLOGY- PROGRESS NOTE    76y Male with history of HTN, COPD presents with SOB. Nephrology consulted for elevated Scr and hyponatremia.       REVIEW OF SYSTEMS:  Gen: no changes in weight  Cards: no chest pain  Resp: + dyspnea  GI: no nausea or vomiting or diarrhea  Vascular: no LE edema    No Known Allergies      Hospital Medications: Medications reviewed      VITALS:  T(F): 98.4 (10-01-22 @ 14:34), Max: 98.5 (22 @ 21:17)  HR: 82 (10-01-22 @ 14:34)  BP: 125/80 (10-01-22 @ 14:34)  RR: 19 (10-01-22 @ 14:34)  SpO2: 88% (10-01-22 @ 14:34)  Wt(kg): --     @ 07:01  -  10-01 @ 07:00  --------------------------------------------------------  IN: 0 mL / OUT: 200 mL / NET: -200 mL    10-01 @ 07:01  -  10-01 @ 15:24  --------------------------------------------------------  IN: 290 mL / OUT: 250 mL / NET: 40 mL      PHYSICAL EXAM:    Gen: NAD, mild tachypnea on VM  Cards: RRR, +S1/S2, no M/G/R  Resp: course BS B/L, Decreased BS @ R base  GI: soft, NT/ND, NABS  Vascular: no LE edema B/L with skin wrinkling        LABS:      133<L>  |  89<L>  |  15  ----------------------------<  109<H>  5.3   |  32<H>  |  0.90    Ca    9.2      30 Sep 2022 07:12  Phos  3.8       Mg     2.20           Creatinine Trend: 0.90 <--, 0.90 <--, 0.94 <--, 1.29 <--, 1.34 <--, 1.44 <--, 1.59 <--    Urine Studies:  Urinalysis Basic - ( 28 Sep 2022 06:22 )    Color: Yellow / Appearance: Clear / S.013 / pH:   Gluc:  / Ketone: Negative  / Bili: Negative / Urobili: <2 mg/dL   Blood:  / Protein: Trace / Nitrite: Negative   Leuk Esterase: Negative / RBC:  / WBC    Sq Epi:  / Non Sq Epi:  / Bacteria:

## 2022-10-01 NOTE — PROGRESS NOTE ADULT - SUBJECTIVE AND OBJECTIVE BOX
Covering for Dr. Delgado    Saturating mid-high 80s on 50% VM    Vital Signs Last 24 Hrs  T(C): 36.8 (01 Oct 2022 06:40), Max: 36.9 (30 Sep 2022 21:17)  T(F): 98.2 (01 Oct 2022 06:40), Max: 98.5 (30 Sep 2022 21:17)  HR: 77 (01 Oct 2022 06:40) (74 - 88)  BP: 131/84 (01 Oct 2022 06:40) (121/76 - 131/84)  BP(mean): --  RR: 19 (01 Oct 2022 06:40) (18 - 19)  SpO2: 86% (01 Oct 2022 06:40) (85% - 95%)    I&O's Summary    09-30-22 @ 07:01  -  10-01-22 @ 07:00  --------------------------------------------------------  IN: 0 mL / OUT: 200 mL / NET: -200 mL        PHYSICAL EXAM:  GENERAL: NAD  EYES: EOMI, PERRLA  NECK: Supple, No JVD  CHEST/LUNG: dec breath sounds at bases  HEART:  S1 , S2 +, RRR  ABDOMEN: soft , bs+, NTND  EXTREMITIES:  edema+  NEUROLOGY:alert awake       LABS:    09-30    133<L>  |  89<L>  |  15  ----------------------------<  109<H>  5.3   |  32<H>  |  0.90    Ca    9.2      30 Sep 2022 07:12  Phos  3.8     09-30  Mg     2.20     09-30        CAPILLARY BLOOD GLUCOSE                RADIOLOGY & ADDITIONAL TESTS:    Imaging Personally Reviewed:  [x] YES  [ ] NO    Will obtain old records:  [ ] YES  [x] NO                                        Imaging Personally Reviewed:    Consultant(s) Notes Reviewed: yes     Care Discussed with Consultants/Other Providers:yes

## 2022-10-02 NOTE — PROGRESS NOTE ADULT - ASSESSMENT
76y Male with history of HTN, COPD presents with SOB. Nephrology consulted for elevated Scr and hyponatremia.    1. ALFREDO: In setting of poor solute intake likely due to pre-renal azotemia. ALFREDO resolved. UA bland. Urine sodium low. Defer renal imaging at this time. Avoid nephrotoxins. Monitor electrolytes.    2. Hyponatremia: Due to poor solute intake given low normal urine osm. Hyponatremia resolved s/p IVF with mild decrease on 9/30 in setting of hemolyzed sample and therefore not accurate. Monitor serum Na.    3. Hyperkalemia: Resolved. Monitor serum potassium.    4. HTN: BP low normal. Decrease nifedipine to 30 mg daily. Monitor BP.    Patient now comfort care. Will sign off. Please call with questions.      [facility] NEPHROLOGY  Marbin Saravia M.D.  Jeremy Ortiz D.O.  Cheri Gardner M.D.  Camila Montiel, BHARTI, ANP-C    Telephone: [phone]  Facsimile: [phone]    [address] [place] [address].

## 2022-10-02 NOTE — PROGRESS NOTE ADULT - SUBJECTIVE AND OBJECTIVE BOX
[facility] NEPHROLOGY- PROGRESS NOTE    76y Male with history of HTN, COPD presents with SOB. Nephrology consulted for elevated Scr and hyponatremia.       REVIEW OF SYSTEMS:  Gen: no changes in weight  Cards: no chest pain  Resp: + dyspnea  GI: no nausea or vomiting or diarrhea  Vascular: no LE edema    No Known Allergies      Hospital Medications: Medications reviewed      VITALS:  T(F): 98.4 (10-02-22 @ 06:30), Max: 98.4 (10-02-22 @ 06:30)  HR: 81 (10-02-22 @ 14:56)  BP: 114/73 (10-02-22 @ 06:30)  RR: 20 (10-02-22 @ 14:56)  SpO2: 90% (10-02-22 @ 14:56)  Wt(kg): --    10-01 @ 07:01  -  10-02 @ 07:00  --------------------------------------------------------  IN: 410 mL / OUT: 400 mL / NET: 10 mL    10-02 @ 07:01  -  10-02 @ 15:48  --------------------------------------------------------  IN: 220 mL / OUT: 0 mL / NET: 220 mL        PHYSICAL EXAM:    Gen: NAD, mild tachypnea on HFNC  Cards: RRR, +S1/S2, no M/G/R  Resp: course BS B/L, Decreased BS @ R base  GI: soft, NT/ND, NABS  Vascular: no LE edema B/L with skin wrinkling        LABS:        Creatinine Trend: 0.90 <--, 0.90 <--, 0.94 <--, 1.29 <--, 1.34 <--    Urine Studies:  Urinalysis Basic - ( 28 Sep 2022 06:22 )    Color: Yellow / Appearance: Clear / S.013 / pH:   Gluc:  / Ketone: Negative  / Bili: Negative / Urobili: <2 mg/dL   Blood:  / Protein: Trace / Nitrite: Negative   Leuk Esterase: Negative / RBC:  / WBC    Sq Epi:  / Non Sq Epi:  / Bacteria:

## 2022-10-02 NOTE — PROGRESS NOTE ADULT - SUBJECTIVE AND OBJECTIVE BOX
Covering for Dr. Delgado    Saturating 90% on 50% VM    Vital Signs Last 24 Hrs  T(C): 36.9 (02 Oct 2022 06:30), Max: 36.9 (01 Oct 2022 14:34)  T(F): 98.4 (02 Oct 2022 06:30), Max: 98.4 (01 Oct 2022 14:34)  HR: 72 (02 Oct 2022 10:33) (72 - 88)  BP: 114/73 (02 Oct 2022 06:30) (114/73 - 125/80)  BP(mean): --  RR: 20 (02 Oct 2022 07:10) (19 - 20)  SpO2: 90% (02 Oct 2022 10:33) (85% - 93%)    I&O's Summary    10-01-22 @ 07:01  -  10-02-22 @ 07:00  --------------------------------------------------------  IN: 410 mL / OUT: 400 mL / NET: 10 mL        PHYSICAL EXAM:  GENERAL: NAD  EYES: EOMI, PERRLA  NECK: Supple, No JVD  CHEST/LUNG: dec breath sounds at bases  HEART:  S1 , S2 +, RRR  ABDOMEN: soft , bs+, NTND  EXTREMITIES:  edema+  NEUROLOGY: lethargic    LABS:            CAPILLARY BLOOD GLUCOSE                RADIOLOGY & ADDITIONAL TESTS:    Imaging Personally Reviewed:  [x] YES  [ ] NO    Will obtain old records:  [ ] YES  [x] NO

## 2022-10-03 NOTE — PROGRESS NOTE ADULT - PROBLEM SELECTOR PLAN 9
- Continue home pantoprazole

## 2022-10-03 NOTE — PROGRESS NOTE ADULT - SUBJECTIVE AND OBJECTIVE BOX
Saturating 90% on 50% VM    MEDICATIONS  (STANDING):  aspirin enteric coated 81 milliGRAM(s) Oral daily  atorvastatin 20 milliGRAM(s) Oral at bedtime  budesonide 160 MICROgram(s)/formoterol 4.5 MICROgram(s) Inhaler 2 Puff(s) Inhalation two times a day  furosemide   Injectable 20 milliGRAM(s) IV Push daily  heparin   Injectable 5000 Unit(s) SubCutaneous every 8 hours  influenza  Vaccine (HIGH DOSE) 0.7 milliLiter(s) IntraMuscular once  metoprolol tartrate 25 milliGRAM(s) Oral two times a day  nicotine -   7 mG/24Hr(s) Patch 1 Patch Transdermal daily  NIFEdipine XL 30 milliGRAM(s) Oral daily  pantoprazole    Tablet 40 milliGRAM(s) Oral before breakfast  polyethylene glycol 3350 17 Gram(s) Oral daily  senna 1 Tablet(s) Oral two times a day  sodium chloride 3%  Inhalation 4 milliLiter(s) Inhalation every 6 hours    MEDICATIONS  (PRN):  acetaminophen     Tablet .. 650 milliGRAM(s) Oral every 6 hours PRN Temp greater or equal to 38C (100.4F), Mild Pain (1 - 3)  albuterol/ipratropium for Nebulization 3 milliLiter(s) Nebulizer every 6 hours PRN Shortness of Breath and/or Wheezing  aluminum hydroxide/magnesium hydroxide/simethicone Suspension 30 milliLiter(s) Oral every 4 hours PRN Dyspepsia  benzonatate 100 milliGRAM(s) Oral every 8 hours PRN Cough  bisacodyl 5 milliGRAM(s) Oral every 12 hours PRN Constipation  guaifenesin/dextromethorphan Oral Liquid 10 milliLiter(s) Oral every 4 hours PRN Cough  lidocaine   4% Patch 1 Patch Transdermal daily PRN pain  melatonin 3 milliGRAM(s) Oral at bedtime PRN Insomnia  nicotine  Polacrilex Lozenge 2 milliGRAM(s) Oral every 2 hours PRN Breakthrough cravings    Vital Signs Last 24 Hrs  T(C): 36.4 (10-03-22 @ 17:25), Max: 36.9 (10-03-22 @ 10:35)  T(F): 97.5 (10-03-22 @ 17:25), Max: 98.5 (10-03-22 @ 10:35)  HR: 84 (10-03-22 @ 22:17) (77 - 91)  BP: 114/68 (10-03-22 @ 17:25) (114/68 - 134/78)  BP(mean): --  RR: 17 (10-03-22 @ 17:25) (17 - 18)  SpO2: 96% (10-03-22 @ 22:17) (90% - 98%)        PHYSICAL EXAM:  GENERAL: NAD  EYES: EOMI, PERRLA  NECK: Supple, No JVD  CHEST/LUNG: dec breath sounds at bases  HEART:  S1 , S2 +, RRR  ABDOMEN: soft , bs+, NTND  EXTREMITIES:  edema+  NEUROLOGY: lethargic    LABS:        Creatinine Trend: 0.90 <--, 0.90 <--, 0.94 <--    Urine Studies:  Urinalysis Basic - ( 28 Sep 2022 06:22 )    Color: Yellow / Appearance: Clear / S.013 / pH:   Gluc:  / Ketone: Negative  / Bili: Negative / Urobili: <2 mg/dL   Blood:  / Protein: Trace / Nitrite: Negative   Leuk Esterase: Negative / RBC:  / WBC    Sq Epi:  / Non Sq Epi:  / Bacteria:

## 2022-10-03 NOTE — PROGRESS NOTE ADULT - PROVIDER SPECIALTY LIST ADULT
Infectious Disease
Internal Medicine
Nephrology
Pulmonology
Cardiology
Pulmonology
Pulmonology
Nephrology
Nephrology
Pulmonology
Cardiology
Nephrology
Internal Medicine

## 2022-10-03 NOTE — PROGRESS NOTE ADULT - PROBLEM SELECTOR PLAN 8
- Continue home metoprolol and nifedipine XR with holding parameters

## 2022-10-03 NOTE — PROGRESS NOTE ADULT - PROBLEM SELECTOR PROBLEM 9
GERD (gastroesophageal reflux disease)

## 2022-10-03 NOTE — PROGRESS NOTE ADULT - PROBLEM SELECTOR PROBLEM 4
ALFREDO (acute kidney injury)

## 2022-10-03 NOTE — PROGRESS NOTE ADULT - PROBLEM SELECTOR PLAN 11
Discussed my concern given patient's persistent acute hypercarbic/hypoxemic respiratory failure despite BiPAP and significant lab derrangements with the patient and his daughter, Laurita, who is his HCP. They both communicated to me that should Mr. Sharp's condition worsen he is FULL CODE for a time-limited trial of interventions. Laurita will try to bring in HCP form today for our records.  - full code
=pts daughter want comfort care
Discussed my concern given patient's persistent acute hypercarbic/hypoxemic respiratory failure despite BiPAP and significant lab derrangements with the patient and his daughter, Laurita, who is his HCP. They both communicated to me that should Mr. Sharp's condition worsen he is FULL CODE for a time-limited trial of interventions. Laurita will try to bring in HCP form today for our records.  - full code
=pts daughter want comfort care
Discussed my concern given patient's persistent acute hypercarbic/hypoxemic respiratory failure despite BiPAP and significant lab derrangements with the patient and his daughter, Laurita, who is his HCP. They both communicated to me that should Mr. Sharp's condition worsen he is FULL CODE for a time-limited trial of interventions. Laurita will try to bring in HCP form today for our records.  - full code
=pts daughter want comfort care
- full code
=pts daughter want comfort care
- full code
Discussed my concern given patient's persistent acute hypercarbic/hypoxemic respiratory failure despite BiPAP and significant lab derrangements with the patient and his daughter, Laurita, who is his HCP. They both communicated to me that should Mr. Sharp's condition worsen he is FULL CODE for a time-limited trial of interventions. Laurita will try to bring in HCP form today for our records.  - full code
- full code

## 2022-10-03 NOTE — PROGRESS NOTE ADULT - PROBLEM SELECTOR PLAN 5
improving    Luciano appears c/w with hypovolemia.  Continue gentle hydration with NS.
na at present 120  strict fluid restriction  renal eval
improving    Luciano appears c/w with hypovolemia.  Continue gentle hydration with NS.
improving    Luciano appears c/w with hypovolemia.  Continue gentle hydration with NS. Repeat labs this evening. Monitor sodium daily
improving    Luciano appears c/w with hypovolemia.  Continue gentle hydration with NS. Repeat labs this evening. Monitor sodium daily
na at present 120
improving    Luciano appears c/w with hypovolemia.  Continue gentle hydration with NS. Repeat labs this evening. Monitor sodium daily

## 2022-10-03 NOTE — PROGRESS NOTE ADULT - PROBLEM SELECTOR PLAN 7
benign abd exam
Reports last BM 4 days PTA  - Miralax and senna standing
benign abd exam

## 2022-10-03 NOTE — PROGRESS NOTE ADULT - PROBLEM SELECTOR PROBLEM 1
Acute respiratory failure with hypoxia and hypercapnia

## 2022-10-03 NOTE — PROGRESS NOTE ADULT - PROBLEM/PLAN-1
DISPLAY PLAN FREE TEXT
negative

## 2022-10-03 NOTE — PROGRESS NOTE ADULT - PROBLEM SELECTOR PLAN 4
renal f/u reviewed  ALFREDO In the setting of hypovolemia. Baseline Cr unknown. On admission SCr 2.29 9/22/22. Continue gentle hydration with NS. Repeat labs this evening. Check I&O's , avoid nephrotoxins.   CKD Likely with CKD3 d/t HTN. Baseline Cr unknown.
renal eval
renal f/u reviewed  ALFREDO In the setting of hypovolemia. Baseline Cr unknown. On admission SCr 2.29 9/22/22. Continue gentle hydration with NS. Repeat labs this evening. Check I&O's , avoid nephrotoxins.   CKD Likely with CKD3 d/t HTN. Baseline Cr unknown.
renal eval
renal f/u reviewed  ALFREDO In the setting of hypovolemia. Baseline Cr unknown. On admission SCr 2.29 9/22/22. Continue gentle hydration with NS. Repeat labs this evening. Check I&O's , avoid nephrotoxins.   CKD Likely with CKD3 d/t HTN. Baseline Cr unknown.
renal f/u reviewed  ALFREDO In the setting of hypovolemia. Baseline Cr unknown. On admission SCr 2.29 9/22/22. Continue gentle hydration with NS. Repeat labs this evening. Check I&O's , avoid nephrotoxins.   CKD Likely with CKD3 d/t HTN. Baseline Cr unknown.

## 2022-10-03 NOTE — PROGRESS NOTE ADULT - PROBLEM SELECTOR PLAN 6
as per Renal , Several of the samples are hemolyzed, but most recent one is not.  S/p Kayexalate X 1 and Lokelma.  Continue lokelma 10g q8h X 48 and reevaluate.    Recheck potassium at 1800 and monitor
improved
K 5.6 --> 5.5 with mildly hemolyzed specimen. Most likely due to hemolyzed specimen, with possible contribution from ALFREDO. No e/o hyperkalemia on EKG.  - LR bolus as above  - Repeat BMP this afternoon  - If persistently elevated and specimen without hemolysis would treat
as per Renal , Several of the samples are hemolyzed, but most recent one is not.  S/p Kayexalate X 1 and Lokelma.  Continue lokelma 10g q8h X 48 and reevaluate.    Recheck potassium at 1800 and monitor
persistently elevated   s/p lokelma  renal f/u
improved
improved

## 2022-10-03 NOTE — PROGRESS NOTE ADULT - PROBLEM SELECTOR PLAN 2
nebs, steroids, suppl oxygen   pulm f/u

## 2022-10-03 NOTE — PROGRESS NOTE ADULT - PROBLEM SELECTOR PLAN 12
heparin 5000 units SC q8h
- HSQ given ALFREDO ordered to start tomorrow in case of thoracentesis today

## 2022-10-03 NOTE — PROGRESS NOTE ADULT - PROBLEM SELECTOR PLAN 1
large right pleural effusion     s/p diagnostic and therapeutic 2.1 L thoracentesis performed appears exudative via LDH and protein, no predominant line, only PMNs in gram stain,  f/u cyto and flow cyto,   f/u CT chest non con: moderate loculated right PLEFF with partial atelectasis and consolidative opacities and hepatic mass  pulm f/u
large right pleural effusion     s/p diagnostic and therapeutic 2.1 L thoracentesis performed appears exudative via LDH and protein, no predominant line, only PMNs in gram stain,  f/u cyto and flow cyto,   f/u CT chest non con: moderate loculated right PLEFF with partial atelectasis and consolidative opacities and hepatic mass  ABG on RA, with Aa gradient however not indicative of CO2 narcosis can hold off on further NIV overnight, and obtain room air ABG tomorrow morning to confirm tolerance without NIV
large right pleural effusion     s/p diagnostic and therapeutic 2.1 L thoracentesis - metstatic adenoca  f/u CT chest non con: moderate loculated right PLEFF with partial atelectasis and consolidative opacities and hepatic mass    given increased respiratory distress , poss thoracentesis by pulm  but later pts daughter decided comfort care for pt  continue aggressive chest pt and airway clearance with hypertonic saline, and acapella, incentive spirometry  COPD: continue duoneb q6, prednisone 40 for 5 days total, please start symbicort, once duoneb made PRN after improvement then start atrovent
large right pleural effusion     s/p diagnostic and therapeutic 2.1 L thoracentesis performed appears exudative via LDH and protein, no predominant line, only PMNs in gram stain,  f/u cyto and flow cyto,   f/u CT chest non con: moderate loculated right PLEFF with partial atelectasis and consolidative opacities and hepatic mass  ABG on RA, with Aa gradient however not indicative of CO2 narcosis can hold off on further NIV overnight, and obtain room air ABG tomorrow morning to confirm tolerance without NIV
large right pleural effusion     s/p diagnostic and therapeutic 2.1 L thoracentesis performed appears exudative via LDH and protein, no predominant line, only PMNs in gram stain,  f/u cyto and flow cyto,   f/u CT chest non con: moderate loculated right PLEFF with partial atelectasis and consolidative opacities and hepatic mass  pulm f/u
large right pleural effusion     s/p diagnostic and therapeutic 2.1 L thoracentesis performed appears exudative via LDH and protein, no predominant line, only PMNs in gram stain,  f/u cyto and flow cyto,   f/u CT chest non con: moderate loculated right PLEFF with partial atelectasis and consolidative opacities and hepatic mass  pulm f/u
large right pleural effusion     s/p diagnostic and therapeutic 2.1 L thoracentesis performed appears exudative via LDH and protein, no predominant line, only PMNs in gram stain,  f/u cyto and flow cyto,   f/u CT chest non con: moderate loculated right PLEFF with partial atelectasis and consolidative opacities and hepatic mass  ABG on RA, with Aa gradient however not indicative of CO2 narcosis can hold off on further NIV overnight, and obtain room air ABG tomorrow morning to confirm tolerance without NIV
large right pleural effusion     s/p diagnostic and therapeutic 2.1 L thoracentesis - metstatic adenoca  f/u CT chest non con: moderate loculated right PLEFF with partial atelectasis and consolidative opacities and hepatic mass    given increased respiratory distress , poss thoracentesis by pulm  but later pts daughter decided comfort care for pt  continue aggressive chest pt and airway clearance with hypertonic saline, and acapella, incentive spirometry  COPD: continue duoneb q6, prednisone 40 for 5 days total, please start symbicort, once duoneb made PRN after improvement then start atrovent
large right pleural effusion     s/p diagnostic and therapeutic 2.1 L thoracentesis - metstatic adenoca  f/u CT chest non con: moderate loculated right PLEFF with partial atelectasis and consolidative opacities and hepatic mass    given increased respiratory distress , poss thoracentesis by pulm  but later pts daughter decided comfort care for pt  continue aggressive chest pt and airway clearance with hypertonic saline, and acapella, incentive spirometry  COPD: continue duoneb q6, prednisone 40 for 5 days total, please start symbicort, once duoneb made PRN after improvement then start atrovent
large right pleural effusion     s/p diagnostic and therapeutic 2.1 L thoracentesis performed appears exudative via LDH and protein, no predominant line, only PMNs in gram stain,  f/u cyto and flow cyto,   f/u CT chest non con: moderate loculated right PLEFF with partial atelectasis and consolidative opacities and hepatic mass  pulm f/u
large right pleural effusion     s/p diagnostic and therapeutic 2.1 L thoracentesis - metstatic adenoca  f/u CT chest non con: moderate loculated right PLEFF with partial atelectasis and consolidative opacities and hepatic mass    given increased respiratory distress , poss thoracentesis by pulm  but later pts daughter decided comfort care for pt  continue aggressive chest pt and airway clearance with hypertonic saline, and acapella, incentive spirometry  COPD: continue duoneb q6, prednisone 40 for 5 days total, please start symbicort, once duoneb made PRN after improvement then start atrovent

## 2022-10-03 NOTE — PROGRESS NOTE ADULT - PROBLEM SELECTOR PROBLEM 11
Goals of care, counseling/discussion

## 2022-10-03 NOTE — PROGRESS NOTE ADULT - PROBLEM SELECTOR PLAN 3
Presented with troponin 1394, now downtrending. EKG chronic RBBB, denies chest pain/pressure.   - Likely type II NSTEMI iso critical illness. I do not believe this represents ACS event. ECG without ischemic changes. C  as per cards , Nuclear stress test
Presented with troponin 1394, now downtrending. EKG chronic RBBB, denies chest pain/pressure.   - Likely type II NSTEMI iso critical illness. I do not believe this represents ACS event. ECG without ischemic changes.  conservative management as per cards
Presented with troponin 1394, now downtrending. EKG chronic RBBB, denies chest pain/pressure.   - Likely type II NSTEMI iso critical illness. I do not believe this represents ACS event. ECG without ischemic changes. C  as per cards , Nuclear stress test
Presented with troponin 1394, now downtrending. EKG chronic RBBB, denies chest pain/pressure.   - Likely type II NSTEMI iso critical illness. I do not believe this represents ACS event. ECG without ischemic changes. C  as per cards , Nuclear stress test
Presented with troponin 1394, now downtrending. EKG chronic RBBB, denies chest pain/pressure.   - Likely type II NSTEMI iso critical illness. I do not believe this represents ACS event. ECG without ischemic changes.  conservative management as per cards

## 2022-10-04 NOTE — PROVIDER CONTACT NOTE (OTHER) - ASSESSMENT
pt is on 6L NC. at times uses a venturi mask when starts to desat below 88%. pt's VSS. pt denies chest pain, dizziness, headache. no signs of distress noted. pt is DNR/DNI MOLST form is at the chart.
pt is on 6L NC currently breathing mouth breathing. pt does complain of shortness of breath. No signs of skin discoloration or signs of cyanosis noted. VSS. family members at bedside.
patient is resting on bed restlessness with AVAPS, trying to pull out the tubes. labored breathing.
patient resting on bed with AVAPS and NPO, labored breathing, unable to communicate, response to tactile stimuli.
pt is on 6L NC currently breathing mouth breathing. pt does complain of shortness of breath. No signs of skin discoloration or signs of cyanosis noted. VSS. family members at bedside.
patient resting on bed with labored breathing, crackle coarse noted to bilateral lung.

## 2022-10-04 NOTE — CHART NOTE - NSCHARTNOTEFT_GEN_A_CORE
Patient has  at 1620 on 10/04/2022. Provider called to notify Laurita - daughter at [phone]. Daughter Laurita and family are coming to hospital to see patient. All questions answered at this time.    Attending Dr Delgado notified.    J Carlos Bell PA-C,   Internal Medicine ACP   In house pager #[phone]

## 2022-10-04 NOTE — CONSULT NOTE ADULT - PROBLEM SELECTOR RECOMMENDATION 2
- Lung cancer s/p lobectomy, but refused RT and chemo  - Now with metastatic disease  - Comfort care

## 2022-10-04 NOTE — CONSULT NOTE ADULT - SUBJECTIVE AND OBJECTIVE BOX
HPI:  Mr. Sharp is a 76 year old man with history of active smoking (recently cut back), COPD, lung cancer s/p QAMAR lobectomy (2019, [facility], no chemo/XRT), ?h/o DVT (off a/c since 2019, ?IVC filter placed in 2019), HTN, PVD/AAA s/p repair (2017) presenting with SOB/cough.    Patient only able to provide limited information given that he was using BiPAP during encounter, and deferred many questions to his daughter, Laurita, who is a med-surg RN. Pt and daughter report that symptoms started ~10 days ago and have predominantly included worsening SOB and cough productive of white sputum. SOB is severe, constant, and worse with exertion. Denies fever/chills, myalgias, changes in weight, URI symptoms, chest pain, hemoptysis, syncope. He describes poor PO intake over this time; he has constipation and denies any diarrhea. He was seen as an outpatient and prescribed levofloxacin x10 days, medrol dose pack, and albuterol inhaler on 9/13; however, these interventions did not seem to help and his symptoms continued to worsen so he presented to the ED fur further evaluation and management.     In the ED found to have O2 sat 74% on RA --> 94% on 3L, VBG 7.19/75, troponin 1394, EKG chronic RBBB, Na 119, Cr 2.29, CXR large R pleural effusion.  Received ceftriaxone/azithromycin, solumedrol 125, duonebs, lasix 40 IV.  Cardiology consulted for elevated troponin, felt most likely 2/2 demand  MICU consulted for respiratory failure - recommended BiPAP 10/5, continue to trend VBG, pulm consult  Pulm consulted for pleural effusion and will evaluate the patient (22 Sep 2022 10:20)    PERTINENT PM/SXH:   HTN (hypertension)    GERD (gastroesophageal reflux disease)    PVD (peripheral vascular disease)    AAA (abdominal aortic aneurysm)    Smoker      No significant past surgical history    Endoleak post (EVAR) endovascular aneurysm repair    S/P ORIF (open reduction internal fixation) fracture    S/P cataract surgery      FAMILY HISTORY:  FH: lung disease (Child)          ------------------------------------------------------------------------------------------------------------    ITEMS NOT CHECKED ARE NOT PRESENT    SOCIAL HISTORY:   Support:   Substance hx:  [ ]   Tobacco hx:  [ ]   Alcohol hx: [ ]   Family Hx substance abuse [ ]yes [ ]no  Home Opioid hx:  [ ] I-Stop Reference No:  Living Situation: [ ]Home  [ ]Long term care  [ ]Rehab [ ]Other  Jainism/Spirituality:    PCSSQ[Palliative Care Spiritual Screening Question]   Severity (0-10):  Score of 4 or > indicate consideration of Chaplaincy referral.  Chaplaincy Referral: [ ] yes [ ] refused [ ] following    Caregiver [place]? : [ ] yes [ ] no [ ] Deferred [ ] Declined               Social work referral [ ] Patient & Family Centered Care Referral [ ]    Anticipatory Grief present?:  [ ] yes [ ] no  [ ] Deferred                    Social work referral [ ] Patient & Family Centered Care Referral [ ]    ------------------------------------------------------------------------------------------------------------    FUNCTIONAL STATUS:     Baseline ADL (prior to admission):  [ ] Independent  [ ] Moderate Assisstance [ ] Dependent  Palliative Performance Score:     [ ]PPSV2 < or = to 30%     NUTRITIONAL STATUS:     Protein Calorie Malnutrition Present:   [ ]mild   [ ]moderate   [ ]severe   [ ]poor nutritional intake   [ ]artificial nutrition      Weight:   [ ]underweight (BMI 18.5 or less)   [ ]morbid obesity (BMI 30 or higher)   [ ]anasarca  [ ]significant weight loss     Height (cm): 168.9 (09-21-22 @ 18:57)  Weight (kg): 103 (09-24-22 @ 18:29)  BMI (kg/m2): 36.1 (09-24-22 @ 18:29)    ------------------------------------------------------------------------------------------------------------    PRIOR ADVANCE DIRECTIVES:    DNR/MOLST  [ ]  Living Will  [ ] [ ] Health Care Proxy(s)  DECISION MAKER(s):  [ ] Patient  [ ] Surrogate(s)  [ ] HCP [ ] Guardian           Name(s):     ------------------------------------------------------------------------------------------------------------    PHYSICAL EXAM:  Vital Signs Last 24 Hrs  T(C): 36.4 (04 Oct 2022 05:50), Max: 36.9 (03 Oct 2022 10:35)  T(F): 97.5 (04 Oct 2022 05:50), Max: 98.5 (03 Oct 2022 10:35)  HR: 80 (04 Oct 2022 08:17) (79 - 86)  BP: 110/71 (04 Oct 2022 05:50) (106/69 - 119/79)  BP(mean): --  RR: 19 (04 Oct 2022 05:50) (17 - 20)  SpO2: 94% (04 Oct 2022 08:17) (90% - 98%)    Parameters below as of 04 Oct 2022 05:50  Patient On (Oxygen Delivery Method): AVAPS     I&O's Summary      GENERAL:  [ ]Cachexia  [ ] Frail  [ ]Awake  [ ]Oriented x   [ ]Lethargic  [ ]Unarousable  [ ]Verbal  [ ]Non-Verbal  Behavioral: [ ] Anxiety  [ ] Delirium [ ] Agitation [ ] Other    HEENT: [ ]Normal   [ ]Dry mouth   [ ]ET Tube/Trach  [ ]Oral lesions    PULMONARY:   [ ]Clear [ ]Tachypnea  [ ]Audible excessive secretions   [ ]Rhonchi        [ ]Right [ ]Left [ ]Bilateral  [ ]Crackles        [ ]Right [ ]Left [ ]Bilateral  [ ]Wheezing     [ ]Right [ ]Left [ ]Bilateral  [ ]Diminished breath sounds [ ]right [ ]left [ ]bilateral    CARDIOVASCULAR:    [ ]Regular [ ]Irregular [ ]Tachy  [ ]Richard [ ]Murmur [ ]Other    GASTROINTESTINAL:  [ ]Soft  [ ]Distended   [ ]+BS  [ ]Non tender [ ]Tender  [ ]Other [ ]PEG [ ]OGT/ NGT  Last BM:    GENITOURINARY:  [ ]Normal [ ] Incontinent   [ ]Oliguria/Anuria   [ ]Mendez    MUSCULOSKELETAL:   [ ]Normal   [ ]Weakness  [ ]Bed/Wheelchair bound [ ]Edema    NEUROLOGIC:   [ ]No focal deficits  [ ]Cognitive impairment  [ ]Dysphagia [ ]Dysarthria [ ]Paresis [ ]Other     SKIN:   [ ]Normal  [ ]Rash  [ ]Other  [ ]Pressure ulcer(s)       Present on admission [ ]y [ ]n    ------------------------------------------------------------------------------------------------------------    LABS:              RADIOLOGY & ADDITIONAL STUDIES:    < from: Xray Chest 1 View- PORTABLE-Urgent (09.21.22 @ 22:08) >  IMPRESSION: Whiteout of right hemithorax likely due to a large effusion.    Comment: CT chest would be more sensitive.    < end of copied text >  < from: CT Chest No Cont (09.23.22 @ 11:57) >  IMPRESSION:  7 cm hepatic mass    Partial left lung resection. Moderate loculated right pleural effusion   partial atelectasis right lower and middle lobes and associated patchy   and consolidative opacities right lung    < end of copied text >      ------------------------------------------------------------------------------------------------------------    ALLERGIES:  Allergies    No Known Allergies    Intolerances        MEDICATIONS  (STANDING):  aspirin enteric coated 81 milliGRAM(s) Oral daily  atorvastatin 20 milliGRAM(s) Oral at bedtime  budesonide 160 MICROgram(s)/formoterol 4.5 MICROgram(s) Inhaler 2 Puff(s) Inhalation two times a day  furosemide   Injectable 20 milliGRAM(s) IV Push daily  heparin   Injectable 5000 Unit(s) SubCutaneous every 8 hours  influenza  Vaccine (HIGH DOSE) 0.7 milliLiter(s) IntraMuscular once  metoprolol tartrate 25 milliGRAM(s) Oral two times a day  nicotine -   7 mG/24Hr(s) Patch 1 Patch Transdermal daily  NIFEdipine XL 30 milliGRAM(s) Oral daily  pantoprazole    Tablet 40 milliGRAM(s) Oral before breakfast  polyethylene glycol 3350 17 Gram(s) Oral daily  senna 1 Tablet(s) Oral two times a day  sodium chloride 3%  Inhalation 4 milliLiter(s) Inhalation every 6 hours    MEDICATIONS  (PRN):  acetaminophen     Tablet .. 650 milliGRAM(s) Oral every 6 hours PRN Temp greater or equal to 38C (100.4F), Mild Pain (1 - 3)  albuterol/ipratropium for Nebulization 3 milliLiter(s) Nebulizer every 6 hours PRN Shortness of Breath and/or Wheezing  aluminum hydroxide/magnesium hydroxide/simethicone Suspension 30 milliLiter(s) Oral every 4 hours PRN Dyspepsia  benzonatate 100 milliGRAM(s) Oral every 8 hours PRN Cough  bisacodyl 5 milliGRAM(s) Oral every 12 hours PRN Constipation  guaifenesin/dextromethorphan Oral Liquid 10 milliLiter(s) Oral every 4 hours PRN Cough  lidocaine   4% Patch 1 Patch Transdermal daily PRN pain  melatonin 3 milliGRAM(s) Oral at bedtime PRN Insomnia  nicotine  Polacrilex Lozenge 2 milliGRAM(s) Oral every 2 hours PRN Breakthrough cravings      ------------------------------------------------------------------------------------------------------------    PRESENT SYMPTOMS:  [ ] Unable to self-report   [ ] CPOT (ICU)  [ ] PAINADs  [ ] RDOS    Source if other than patient:   [ ]Family   [ ]Team     PAIN:   If blank, patient unable to specify   [ ]yes [ ]no  Location -                    Aggravating factors -  Quality -  Radiation -  Timing-  Severity (0-10 scale):  Minimal acceptable level (0-10 scale):     Dyspnea:                           [ ]Mild [ ]Moderate [ ]Severe  Anxiety:                             [ ]Mild [ ]Moderate [ ]Severe  Fatigue:                             [ ]Mild [ ]Moderate [ ]Severe  Nausea:                             [ ]Mild [ ]Moderate [ ]Severe  Loss of appetite:              [ ]Mild [ ]Moderate [ ]Severe  Constipation:                    [ ]Mild [ ]Moderate [ ]Severe    Other Symptoms:  [ ]All other review of systems negative     Home Medications for symptoms if any:     ------------------------------------------------------------------------------------------------------------    CRITICAL CARE:  [ ] Shock Present  [ ]Septic [ ]Cardiogenic [ ]Neurologic [ ]Hypovolemic  [ ]  Vasopressors [ ]  Inotropes   [ ]Respiratory failure present [ ]Mechanical ventilation [ ]Non-invasive ventilatory support [ ]High flow  Mode: NIV (Noninvasive Ventilation), RR (machine): 18, TV (machine): 500, FiO2: 100, PEEP: 7, ITime: 1, P-High: 25, P-Low: 10, PIP: 21  [ ]Acute  [ ]Chronic [ ]Hypoxic  [ ]Hypercarbic [ ]Other  [ ]Other organ failure     Other REFERRALS:  [ ]Hospice  [ ]Child Life  [ ]Social Work  [ ]Case management [ ]Holistic Therapy    HPI:  Patient is a 76 year old man with HTN/COPD, lung cancer s/p QAMAR lobectomy (2019, [facility], no chemo/XRT), PVD/AAA s/p repair (2017) presented to ED with worsening SOB and cough. In the ED found to have O2 sat 74% on RA --> 94% on 3L, VBG 7.19/75,  Na 119, Cr 2.29, CXR large R pleural effusion. Patient underwent thoracentesis, and cytology revealed metastatic adenocarcinoma. Patient has had persistent respiratory failure, now on bipap. Patient's HCP voiced she did not want to pursue cancer treatment and wanted comfort care measures. Palliative consulted for end of life symptom management.     Patient was seen this AM, on bipap, unable to speak. However is able to follow commands. See below for discussion with patient's daughter Laurita.     PERTINENT PM/SXH:   HTN (hypertension)    GERD (gastroesophageal reflux disease)    PVD (peripheral vascular disease)    AAA (abdominal aortic aneurysm)    Smoker      No significant past surgical history    Endoleak post (EVAR) endovascular aneurysm repair    S/P ORIF (open reduction internal fixation) fracture    S/P cataract surgery      FAMILY HISTORY:  FH: lung disease (Child)      ------------------------------------------------------------------------------------------------------------    ITEMS NOT CHECKED ARE NOT PRESENT    SOCIAL HISTORY:   Support: Lives with a son. Has 15 children. Wife  of GBM 2 years ago.   Substance hx:  [ ]   Tobacco hx:  [X ]   Alcohol hx: [X ]   Family Hx substance abuse [ ]yes [ ]no  Home Opioid hx:  [ ] I-Stop Reference No:  Living Situation: [X ]Home  [ ]Long term care  [ ]Rehab [ ]Other  Moravian/Spirituality:    PCSSQ[Palliative Care Spiritual Screening Question]   Severity (0-10):0  Score of 4 or > indicate consideration of Chaplaincy referral.  Chaplaincy Referral: [ ] yes [X ] refused [ ] following    Caregiver [place]? : [ ] yes [ ] no [X ] Deferred [ ] Declined               Social work referral [ ] Patient & Family Centered Care Referral [ ]    Anticipatory Grief present?:  [ ] yes [X ] no  [ ] Deferred                    Social work referral [ ] Patient & Family Centered Care Referral [ ]    ------------------------------------------------------------------------------------------------------------    FUNCTIONAL STATUS:     Baseline ADL (prior to admission):  [X ] Independent  [ ] Moderate Assistance [ ] Dependent  Palliative Performance Score: 60%     [ ]PPSV2 < or = to 30%     NUTRITIONAL STATUS:     Protein Calorie Malnutrition Present:   [ ]mild   [ ]moderate   [ ]severe   [X ]poor nutritional intake   [ ]artificial nutrition      Weight:   [ ]underweight (BMI 18.5 or less)   [X ]morbid obesity (BMI 30 or higher)   [ ]anasarca  [ ]significant weight loss     Height (cm): 168.9 (22 @ 18:57)  Weight (kg): 103 (22 @ 18:29)  BMI (kg/m2): 36.1 (22 @ 18:29)    ------------------------------------------------------------------------------------------------------------    PRIOR ADVANCE DIRECTIVES:    DNR/MOLST  [ ]  Living Will  [ ] [X ] Health Care Proxy(s)  DECISION MAKER(s): HCP  [ ] Patient  [ ] Surrogate(s)  [X ] HCP [ ] Guardian           Name(s): Laurita Sharp     ------------------------------------------------------------------------------------------------------------    PHYSICAL EXAM:  Vital Signs Last 24 Hrs  T(C): 36.4 (04 Oct 2022 05:50), Max: 36.9 (03 Oct 2022 10:35)  T(F): 97.5 (04 Oct 2022 05:50), Max: 98.5 (03 Oct 2022 10:35)  HR: 80 (04 Oct 2022 08:17) (79 - 86)  BP: 110/71 (04 Oct 2022 05:50) (106/69 - 119/79)  BP(mean): --  RR: 19 (04 Oct 2022 05:50) (17 - 20)  SpO2: 94% (04 Oct 2022 08:17) (90% - 98%)    Parameters below as of 04 Oct 2022 05:50  Patient On (Oxygen Delivery Method): AVAPS     I&O's Summary      GENERAL:  [ ]Cachexia  [X ] Frail  [ ]Awake  [ ]Oriented x   [X ]Lethargic  [ ]Unarousable  [ ]Verbal  [ ]Non-Verbal  Behavioral: [ ] Anxiety  [ ] Delirium [ ] Agitation [ ] Other    HEENT: [ ]Normal   [ ]Dry mouth   [X ]ET Tube/Trach/Bipap  [ ]Oral lesions    PULMONARY:   [ ]Clear [ ]Tachypnea  [ ]Audible excessive secretions   [ ]Rhonchi        [ ]Right [ ]Left [ ]Bilateral  [ ]Crackles        [ ]Right [ ]Left [ ]Bilateral  [ ]Wheezing     [ ]Right [ ]Left [ ]Bilateral  [X ]Diminished breath sounds [ ]right [ ]left [X ]bilateral    CARDIOVASCULAR:    [X ]Regular [ ]Irregular [ ]Tachy  [ ]Richard [ ]Murmur [ ]Other    GASTROINTESTINAL:  [X ]Soft  [ ]Distended   [ ]+BS  [X ]Non tender [ ]Tender  [ ]Other [ ]PEG [ ]OGT/ NGT  Last BM:    GENITOURINARY:  [ ]Normal [X ] Incontinent   [ ]Oliguria/Anuria   [ ]Mendez    MUSCULOSKELETAL:   [ ]Normal   [ ]Weakness  [X ]Bed/Wheelchair bound [ ]Edema    NEUROLOGIC:   [X ]No focal deficits  [ ]Cognitive impairment  [ ]Dysphagia [ ]Dysarthria [ ]Paresis [ ]Other     SKIN:   [X ]Normal  [ ]Rash  [ ]Other  [ ]Pressure ulcer(s)       Present on admission [ ]y [ ]n    ------------------------------------------------------------------------------------------------------------    LABS:              RADIOLOGY & ADDITIONAL STUDIES:    < from: Xray Chest 1 View- PORTABLE-Urgent (22 @ 22:08) >  IMPRESSION: Whiteout of right hemithorax likely due to a large effusion.    Comment: CT chest would be more sensitive.    < end of copied text >  < from: CT Chest No Cont (22 @ 11:57) >  IMPRESSION:  7 cm hepatic mass    Partial left lung resection. Moderate loculated right pleural effusion   partial atelectasis right lower and middle lobes and associated patchy   and consolidative opacities right lung    < end of copied text >      ------------------------------------------------------------------------------------------------------------    ALLERGIES:  Allergies    No Known Allergies    Intolerances        MEDICATIONS  (STANDING):  aspirin enteric coated 81 milliGRAM(s) Oral daily  atorvastatin 20 milliGRAM(s) Oral at bedtime  budesonide 160 MICROgram(s)/formoterol 4.5 MICROgram(s) Inhaler 2 Puff(s) Inhalation two times a day  furosemide   Injectable 20 milliGRAM(s) IV Push daily  heparin   Injectable 5000 Unit(s) SubCutaneous every 8 hours  influenza  Vaccine (HIGH DOSE) 0.7 milliLiter(s) IntraMuscular once  metoprolol tartrate 25 milliGRAM(s) Oral two times a day  nicotine -   7 mG/24Hr(s) Patch 1 Patch Transdermal daily  NIFEdipine XL 30 milliGRAM(s) Oral daily  pantoprazole    Tablet 40 milliGRAM(s) Oral before breakfast  polyethylene glycol 3350 17 Gram(s) Oral daily  senna 1 Tablet(s) Oral two times a day  sodium chloride 3%  Inhalation 4 milliLiter(s) Inhalation every 6 hours    MEDICATIONS  (PRN):  acetaminophen     Tablet .. 650 milliGRAM(s) Oral every 6 hours PRN Temp greater or equal to 38C (100.4F), Mild Pain (1 - 3)  albuterol/ipratropium for Nebulization 3 milliLiter(s) Nebulizer every 6 hours PRN Shortness of Breath and/or Wheezing  aluminum hydroxide/magnesium hydroxide/simethicone Suspension 30 milliLiter(s) Oral every 4 hours PRN Dyspepsia  benzonatate 100 milliGRAM(s) Oral every 8 hours PRN Cough  bisacodyl 5 milliGRAM(s) Oral every 12 hours PRN Constipation  guaifenesin/dextromethorphan Oral Liquid 10 milliLiter(s) Oral every 4 hours PRN Cough  lidocaine   4% Patch 1 Patch Transdermal daily PRN pain  melatonin 3 milliGRAM(s) Oral at bedtime PRN Insomnia  nicotine  Polacrilex Lozenge 2 milliGRAM(s) Oral every 2 hours PRN Breakthrough cravings      ------------------------------------------------------------------------------------------------------------    PRESENT SYMPTOMS:  [ ] Unable to self-report   [ ] CPOT (ICU)  [ ] PAINADs  [X ] RDOS    Source if other than patient:   [ ]Family   [ ]Team     PAIN:   If blank, patient unable to specify   [ ]yes [ ]no  Location -                    Aggravating factors -  Quality -  Radiation -  Timing-  Severity (0-10 scale):  Minimal acceptable level (0-10 scale):     Dyspnea:                           [ ]Mild [ ]Moderate [ ]Severe  Anxiety:                             [ ]Mild [ ]Moderate [ ]Severe  Fatigue:                             [ ]Mild [ ]Moderate [ ]Severe  Nausea:                             [ ]Mild [ ]Moderate [ ]Severe  Loss of appetite:              [ ]Mild [ ]Moderate [ ]Severe  Constipation:                    [ ]Mild [ ]Moderate [ ]Severe    Other Symptoms:  [ ]All other review of systems negative     Home Medications for symptoms if any:     ------------------------------------------------------------------------------------------------------------    CRITICAL CARE:  [ ] Shock Present  [ ]Septic [ ]Cardiogenic [ ]Neurologic [ ]Hypovolemic  [ ]  Vasopressors [ ]  Inotropes   [ ]Respiratory failure present [ ]Mechanical ventilation [X ]Non-invasive ventilatory support [ ]High flow  Mode  [ ]Acute  [ ]Chronic [ ]Hypoxic  [ ]Hypercarbic [ ]Other  [ ]Other organ failure     Other REFERRALS:  [ ]Hospice  [ ]Child Life  [ ]Social Work  [ ]Case management [ ]Holistic Therapy    HPI:  Patient is a 76 year old man with HTN/COPD, lung cancer s/p QAMAR lobectomy (2019, [facility], no chemo/XRT), PVD/AAA s/p repair (2017) presented to ED with worsening SOB and cough. In the ED found to have O2 sat 74% on RA --> 94% on 3L, VBG 7.19/75,  Na 119, Cr 2.29, CXR large R pleural effusion. Patient underwent thoracentesis, and cytology revealed metastatic adenocarcinoma. Patient has had persistent respiratory failure, now on bipap. Patient's HCP voiced she did not want to pursue cancer treatment and wanted comfort care measures. Palliative consulted for end of life symptom management.     Patient was seen this AM, on bipap, unable to speak. However is able to follow commands. See below for discussion with patient's daughter Laurita.     PERTINENT PM/SXH:   HTN (hypertension)    GERD (gastroesophageal reflux disease)    PVD (peripheral vascular disease)    AAA (abdominal aortic aneurysm)    Smoker      No significant past surgical history    Endoleak post (EVAR) endovascular aneurysm repair    S/P ORIF (open reduction internal fixation) fracture    S/P cataract surgery      FAMILY HISTORY:  FH: lung disease (Child)      ------------------------------------------------------------------------------------------------------------    ITEMS NOT CHECKED ARE NOT PRESENT    SOCIAL HISTORY:   Support: Lives with a son. Has 15 children. Wife  of GBM 2 years ago.   Work: Worked as  at [employer]   Substance hx:  [ ]   Tobacco hx:  [X ]   Alcohol hx: [X ]   Family Hx substance abuse [ ]yes [ ]no  Home Opioid hx:  [ ] I-Stop Reference No:  Living Situation: [X ]Home  [ ]Long term care  [ ]Rehab [ ]Other  Mormon/Spirituality:    PCSSQ[Palliative Care Spiritual Screening Question]   Severity (0-10):0  Score of 4 or > indicate consideration of Chaplaincy referral.  Chaplaincy Referral: [ ] yes [X ] refused [ ] following    Caregiver [place]? : [ ] yes [ ] no [X ] Deferred [ ] Declined               Social work referral [ ] Patient & Family Centered Care Referral [ ]    Anticipatory Grief present?:  [ ] yes [X ] no  [ ] Deferred                    Social work referral [ ] Patient & Family Centered Care Referral [ ]    ------------------------------------------------------------------------------------------------------------    FUNCTIONAL STATUS:     Baseline ADL (prior to admission):  [X ] Independent  [ ] Moderate Assistance [ ] Dependent  Palliative Performance Score: 60%     [ ]PPSV2 < or = to 30%     NUTRITIONAL STATUS:     Protein Calorie Malnutrition Present:   [ ]mild   [ ]moderate   [ ]severe   [X ]poor nutritional intake   [ ]artificial nutrition      Weight:   [ ]underweight (BMI 18.5 or less)   [X ]morbid obesity (BMI 30 or higher)   [ ]anasarca  [ ]significant weight loss     Height (cm): 168.9 (22 @ 18:57)  Weight (kg): 103 (22 @ 18:29)  BMI (kg/m2): 36.1 (22 @ 18:29)    ------------------------------------------------------------------------------------------------------------    PRIOR ADVANCE DIRECTIVES:    DNR/MOLST  [ ]  Living Will  [ ] [X ] Health Care Proxy(s)  DECISION MAKER(s): HCP  [ ] Patient  [ ] Surrogate(s)  [X ] HCP [ ] Guardian           Name(s): Laurita Sharp     ------------------------------------------------------------------------------------------------------------    PHYSICAL EXAM:  Vital Signs Last 24 Hrs  T(C): 36.4 (04 Oct 2022 05:50), Max: 36.9 (03 Oct 2022 10:35)  T(F): 97.5 (04 Oct 2022 05:50), Max: 98.5 (03 Oct 2022 10:35)  HR: 80 (04 Oct 2022 08:17) (79 - 86)  BP: 110/71 (04 Oct 2022 05:50) (106/69 - 119/79)  BP(mean): --  RR: 19 (04 Oct 2022 05:50) (17 - 20)  SpO2: 94% (04 Oct 2022 08:17) (90% - 98%)    Parameters below as of 04 Oct 2022 05:50  Patient On (Oxygen Delivery Method): AVADILIA     I&O's Summary      GENERAL:  [ ]Cachexia  [X ] Frail  [ ]Awake  [ ]Oriented x   [X ]Lethargic  [ ]Unarousable  [ ]Verbal  [ ]Non-Verbal  Behavioral: [ ] Anxiety  [ ] Delirium [ ] Agitation [ ] Other    HEENT: [ ]Normal   [ ]Dry mouth   [X ]ET Tube/Trach/Bipap  [ ]Oral lesions    PULMONARY:   [ ]Clear [ ]Tachypnea  [ ]Audible excessive secretions   [ ]Rhonchi        [ ]Right [ ]Left [ ]Bilateral  [ ]Crackles        [ ]Right [ ]Left [ ]Bilateral  [ ]Wheezing     [ ]Right [ ]Left [ ]Bilateral  [X ]Diminished breath sounds [ ]right [ ]left [X ]bilateral    CARDIOVASCULAR:    [X ]Regular [ ]Irregular [ ]Tachy  [ ]Richard [ ]Murmur [ ]Other    GASTROINTESTINAL:  [X ]Soft  [ ]Distended   [ ]+BS  [X ]Non tender [ ]Tender  [ ]Other [ ]PEG [ ]OGT/ NGT  Last BM:    GENITOURINARY:  [ ]Normal [X ] Incontinent   [ ]Oliguria/Anuria   [ ]Mendez    MUSCULOSKELETAL:   [ ]Normal   [ ]Weakness  [X ]Bed/Wheelchair bound [ ]Edema    NEUROLOGIC:   [X ]No focal deficits  [ ]Cognitive impairment  [ ]Dysphagia [ ]Dysarthria [ ]Paresis [ ]Other     SKIN:   [X ]Normal  [ ]Rash  [ ]Other  [ ]Pressure ulcer(s)       Present on admission [ ]y [ ]n    ------------------------------------------------------------------------------------------------------------    LABS:              RADIOLOGY & ADDITIONAL STUDIES:    < from: Xray Chest 1 View- PORTABLE-Urgent (22 @ 22:08) >  IMPRESSION: Whiteout of right hemithorax likely due to a large effusion.    Comment: CT chest would be more sensitive.    < end of copied text >  < from: CT Chest No Cont (22 @ 11:57) >  IMPRESSION:  7 cm hepatic mass    Partial left lung resection. Moderate loculated right pleural effusion   partial atelectasis right lower and middle lobes and associated patchy   and consolidative opacities right lung    < end of copied text >      ------------------------------------------------------------------------------------------------------------    ALLERGIES:  Allergies    No Known Allergies    Intolerances        MEDICATIONS  (STANDING):  aspirin enteric coated 81 milliGRAM(s) Oral daily  atorvastatin 20 milliGRAM(s) Oral at bedtime  budesonide 160 MICROgram(s)/formoterol 4.5 MICROgram(s) Inhaler 2 Puff(s) Inhalation two times a day  furosemide   Injectable 20 milliGRAM(s) IV Push daily  heparin   Injectable 5000 Unit(s) SubCutaneous every 8 hours  influenza  Vaccine (HIGH DOSE) 0.7 milliLiter(s) IntraMuscular once  metoprolol tartrate 25 milliGRAM(s) Oral two times a day  nicotine -   7 mG/24Hr(s) Patch 1 Patch Transdermal daily  NIFEdipine XL 30 milliGRAM(s) Oral daily  pantoprazole    Tablet 40 milliGRAM(s) Oral before breakfast  polyethylene glycol 3350 17 Gram(s) Oral daily  senna 1 Tablet(s) Oral two times a day  sodium chloride 3%  Inhalation 4 milliLiter(s) Inhalation every 6 hours    MEDICATIONS  (PRN):  acetaminophen     Tablet .. 650 milliGRAM(s) Oral every 6 hours PRN Temp greater or equal to 38C (100.4F), Mild Pain (1 - 3)  albuterol/ipratropium for Nebulization 3 milliLiter(s) Nebulizer every 6 hours PRN Shortness of Breath and/or Wheezing  aluminum hydroxide/magnesium hydroxide/simethicone Suspension 30 milliLiter(s) Oral every 4 hours PRN Dyspepsia  benzonatate 100 milliGRAM(s) Oral every 8 hours PRN Cough  bisacodyl 5 milliGRAM(s) Oral every 12 hours PRN Constipation  guaifenesin/dextromethorphan Oral Liquid 10 milliLiter(s) Oral every 4 hours PRN Cough  lidocaine   4% Patch 1 Patch Transdermal daily PRN pain  melatonin 3 milliGRAM(s) Oral at bedtime PRN Insomnia  nicotine  Polacrilex Lozenge 2 milliGRAM(s) Oral every 2 hours PRN Breakthrough cravings      ------------------------------------------------------------------------------------------------------------    PRESENT SYMPTOMS:  [ ] Unable to self-report   [ ] CPOT (ICU)  [ ] PAINADs  [X ] RDOS    Source if other than patient:   [ ]Family   [ ]Team     PAIN:   If blank, patient unable to specify   [ ]yes [ ]no  Location -                    Aggravating factors -  Quality -  Radiation -  Timing-  Severity (0-10 scale):  Minimal acceptable level (0-10 scale):     Dyspnea:                           [ ]Mild [ ]Moderate [ ]Severe  Anxiety:                             [ ]Mild [ ]Moderate [ ]Severe  Fatigue:                             [ ]Mild [ ]Moderate [ ]Severe  Nausea:                             [ ]Mild [ ]Moderate [ ]Severe  Loss of appetite:              [ ]Mild [ ]Moderate [ ]Severe  Constipation:                    [ ]Mild [ ]Moderate [ ]Severe    Other Symptoms:  [ ]All other review of systems negative     Home Medications for symptoms if any:     ------------------------------------------------------------------------------------------------------------    CRITICAL CARE:  [ ] Shock Present  [ ]Septic [ ]Cardiogenic [ ]Neurologic [ ]Hypovolemic  [ ]  Vasopressors [ ]  Inotropes   [ ]Respiratory failure present [ ]Mechanical ventilation [X ]Non-invasive ventilatory support [ ]High flow  Mode  [ ]Acute  [ ]Chronic [ ]Hypoxic  [ ]Hypercarbic [ ]Other  [ ]Other organ failure     Other REFERRALS:  [ ]Hospice  [ ]Child Life  [ ]Social Work  [ ]Case management [ ]Holistic Therapy

## 2022-10-04 NOTE — PROVIDER CONTACT NOTE (OTHER) - RECOMMENDATIONS
provider aware
Continue to monitor
provider aware, closely monitoring
provider aware, suggested IV medication for restlessness for patient.

## 2022-10-04 NOTE — CHART NOTE - NSCHARTNOTESELECT_GEN_ALL_CORE
Event Note
Hyponatremia/Hyperkalemia/Event Note
goals of care/Event Note

## 2022-10-04 NOTE — CONSULT NOTE ADULT - REASON FOR ADMISSION
Shortness of breath and cough

## 2022-10-04 NOTE — CONSULT NOTE ADULT - CONVERSATION DETAILS
Patient was seen this AM with his daughter/HCP Laurita and a daughter in law at bedside. Laurita is a retired nurse with strong understanding of patient's condition. She understands patient is dying and wants to ensure comfort. She feels patient needs a medication to keep him calm as he is always trying to take off bipap. I discussed removing of bipap as it is very uncomfortable in it of itself and treat for symptoms. Laurita was readily in agreement with bipap removal with symptom management. Patient was seen this AM with his daughter/HCP Laurita and a daughter in law at bedside. Laurita is a retired nurse with strong understanding of patient's condition. She understands patient is dying and wants to ensure comfort. She feels patient needs a medication to keep him calm as he is always trying to take off bipap. I discussed removing of bipap as it is very uncomfortable in it of itself and to treat for symptoms. Laurita was readily in agreement with bipap removal with symptom management.

## 2022-10-04 NOTE — PROVIDER CONTACT NOTE (OTHER) - DATE AND TIME:
01-Oct-2022 21:15
04-Oct-2022 12:17
30-Sep-2022 18:45
04-Oct-2022 12:44
02-Oct-2022 12:42
30-Sep-2022 10:10

## 2022-10-04 NOTE — PROVIDER CONTACT NOTE (OTHER) - REASON
pt had 3 beats of Vtach noted on tele
pt was desating to 83% on 6L NC
Pt SpO2 was 85
desaturation
patient is restlessness
patient unable to take oral medication

## 2022-10-04 NOTE — CONSULT NOTE ADULT - PROBLEM SELECTOR RECOMMENDATION 3
- Decision maker: HCP Laurita Sharp  - Laurita had already decided to not pursue further cancer therapy. She is aware patient is dying and would like him to die peacefully and comfortably   - Readily agreed to removal of bipap  - MOLST completed for DNR/DNI and comfort care

## 2022-10-04 NOTE — CONSULT NOTE ADULT - PROVIDER SPECIALTY LIST ADULT
Pulmonology
Cardiology
Intervent Radiology
MICU
Infectious Disease
MICU
Palliative Care
Heme/Onc
Nephrology
Ophthalmology

## 2022-10-04 NOTE — PROVIDER CONTACT NOTE (OTHER) - SITUATION
Pt SpO2 85.
patient O2 sat remains between 83% to 88% with Venturi mask
patient was noted with restlessness and trying to pull out the tube.
pt had 3 beats of vtach noted by tele tech
pt was desating to 83% on 6L NC noted on tele with good pleath
patient is unable to swallow, oral medications failed to be administered (Aspirin, Miralax)

## 2022-10-04 NOTE — CHART NOTE - NSCHARTNOTEFT_GEN_A_CORE
Attempted to see patient at bedside. Per healthcare proxy, patient is on comfort care and would like to defer any eye examinations and treatments at this time.   Family told to let us know if patient endorses any significant eye pain or loss of vision.  Please re-consult if we can be of any service.    Discussed with Dr Flo Flanagan

## 2022-10-04 NOTE — PROVIDER CONTACT NOTE (OTHER) - BACKGROUND
pt admitted for COPD exarcerbation. PMH HTN, PVD, smoker, HTN, and GERD.
patient was admitted for COPD with PHMx of AAA, PVD, Gerd, HTN, patient is on palliative care
patient was admitted for COPD with PMHx of HTN, AAA, Gerd, lung cancer.
pt admitted for COPD exacerbation. PMH PVD, GERD, HTN, DVT prophylaxis
patient was admitted for COPD with PMHx of AAA, PVD, lung cancer
pt admitted for COPD exarcerbation. PMH HTN, PVD, smoker, HTN, and GERD.

## 2022-10-04 NOTE — CONSULT NOTE ADULT - ASSESSMENT
Patient is a 76 year old man with HTN/COPD, lung cancer s/p QAMAR lobectomy (2019, [facility], no chemo/XRT), PVD/AAA s/p repair (2017) presented to ED with worsening SOB and cough. In the ED found to have O2 sat 74% on RA --> 94% on 3L, VBG 7.19/75,  Na 119, Cr 2.29, CXR large R pleural effusion. Patient underwent thoracentesis, and cytology revealed metastatic adenocarcinoma. Patient has had persistent respiratory failure, now on bipap. Patient's HCP voiced she did not want to pursue cancer treatment and wanted comfort care measures. Palliative consulted for end of life symptom management.

## 2022-10-04 NOTE — CONSULT NOTE ADULT - CONSULT REQUESTED DATE/TIME
21-Sep-2022 23:54
23-Sep-2022 11:01
30-Sep-2022 15:30
04-Oct-2022 10:06
22-Sep-2022 08:56
27-Sep-2022 01:10
30-Sep-2022 19:19
22-Sep-2022 01:30
24-Sep-2022
23-Sep-2022 18:16

## 2022-10-04 NOTE — DISCHARGE NOTE FOR THE EXPIRED PATIENT - OTHER SIGNIFICANT FINDINGS
On physical exam, patient did not respond to verbal or physical stimuli. No spontaneous respirations.  Absent heart and breath sounds. Absent radial, femoral, and carotid pulses. Pupils are fixed, dilated, not reactive to light, no corneal reflex. Attending notified.

## 2022-10-04 NOTE — DISCHARGE NOTE FOR THE EXPIRED PATIENT - HOSPITAL COURSE
Mr Liu is a 76 year old man with HTN/COPD, lung cancer s/p QAMAR lobectomy (2019, [facility], no chemo/ XRT), PVD/ AAA s/p repair (2017) presented to ED with worsening SOB and cough. In the ED found to have O2 sat 74% on RA --> 94% on 3L, VBG 7.19/75,  Na 119, Cr 2.29, CXR large R pleural effusion. Patient underwent thoracentesis, and cytology revealed metastatic adenocarcinoma. Patient has had persistent respiratory failure. Patient's HCP voiced she did not want to pursue cancer treatment and wanted comfort care measures. Palliative care was consulted for [facility] and patient was made DNR/ DNI/ comfort measures. Patient  on 10/04/22 at 1620.

## 2022-10-04 NOTE — PROVIDER CONTACT NOTE (OTHER) - ACTION/TREATMENT ORDERED:
ACP notified and aware. pt is on continuous  and tele. Venturi mask with 50% was placed and saturation rate went up to 89% with good pleath. will continue to monitor.
provider aware, awaiting for response
ACP notified and aware. pt will be on tele monitoring and continuous . no interventions needed. As per ACP, this can be caused because of his lungs, pulmonary following. will continue to monitor.
provider aware, awaiting for response
provider aware, awaiting for response.
ACP notified and aware. pt is on continuous  and tele. Lasix was ordered and administed. Venturi mask with 50% was placed and saturation rate went up to 90% with good pleath. Continue to monitor.

## 2022-10-04 NOTE — CONSULT NOTE ADULT - PROBLEM SELECTOR RECOMMENDATION 9
- Secondary to COPD (active smoker) and L pleural effusion 2/2 metastatic adenocarcinoma  - HCP refusing further treatment, now comfort care  - s/p liberation from bipap, premedicated with IV morphine 2 mg, also receive ativan   - After liberation, patient looks more comfortable  - Started on IV morphine 2 mg q2h PRN for respiratory distress  - Started on IV ativan 0.5 mg q6h PRN for agitation   - Started on IV glycopyrrolate 0.4 mg q6h for oral secretions

## 2022-10-22 LAB
CULTURE RESULTS: SIGNIFICANT CHANGE UP
SPECIMEN SOURCE: SIGNIFICANT CHANGE UP

## 2022-11-12 LAB
CULTURE RESULTS: SIGNIFICANT CHANGE UP
SPECIMEN SOURCE: SIGNIFICANT CHANGE UP

## 2023-02-01 NOTE — ED ADULT NURSE NOTE - SUICIDE SCREENING QUESTION 1
Rhomboid Stretch        Bring arm across body and hold with opposite hand until a gentle stretch is felt. Tilt your chin down. Hold the stretch for a few seconds and relax. Repeat the stretch- relax motion about 10 times. Do this a few times a day. Do not stretch to the point of pain.     Doorway Stretch         Setup: Start facing a doorway with feet shoulder width apart.    Movement: Bring up your arm to the door frame, grasping it at shoulder height and parallel to the floor. After holding a firm grasp to the door frame, sink down slowly into the stretch by keeping the head down and the buttock out. Rotate your body to stretch rhomboids/lats.     Tip: Make sure that when coming out of this stretch that you come up slowly, to prevent injuring the shoulder.     Tennis Ball Massage-     Upper Back  Place ball against wall and lean into ball under tender area of back. Bring same arm across the body with thumb down. Tilt chin down and gently roll on the ball for a few minutes. Do this gently initially and build up your tolerance. Use the tennis ball once every other day initially.                   
No

## 2023-10-09 NOTE — PHYSICAL THERAPY INITIAL EVALUATION ADULT - ADDITIONAL COMMENTS
Pt reports that he lives alone in an apartment with no steps to enter. Prior to hospital admission pt was completely independent and used a rollator with household ambulation and used a manual wheelchair with community ambulation. Pt denies any recent falls. He states that he has an O2 tank @ home, although doesn't use it.    Pt left comfortable in bed, NAD, all lines intact, all precautions maintained, with call bell in reach, bed alarm on, and RN aware of PT evaluation.
65.76

## 2023-12-15 NOTE — DIETITIAN INITIAL EVALUATION ADULT - NAME AND PHONE
Detail Level: Zone Sunscreen Recommendations: 50+ sunscreen with Zinc/Titanium. Traci Vasquez RD, CDN  Pager [phone]

## 2024-01-01 NOTE — PROGRESS NOTE ADULT - REASON FOR ADMISSION
Goal Outcome Evaluation:           Progress: improving  Outcome Evaluation: Vitals WNL. Infant has voided this shift. No stool. Penis is red and swollen from circumcision. No active bleeding noted. Circ care reviewed with parents. Infant has voided this shift. No stool. He is tolerating breast feeding without issue. Infant is jittery. Follow-up appointment with pediatrician obtained and charted. D/C'd home with parents late morning.                               
Goal Outcome Evaluation:           Progress: no change (eval)   SLP evaluation completed. Will continue to address feeding. Please see note for further details and recommendations.                                 
Shortness of breath and cough

## 2024-11-19 NOTE — H&P PST ADULT - NEGATIVE GENERAL GENITOURINARY SYMPTOMS
Stroke team activated (non-acute)    no dysuria/no nocturia/no bladder infections/no renal colic/no hematuria
